# Patient Record
Sex: MALE | Race: WHITE | NOT HISPANIC OR LATINO
[De-identification: names, ages, dates, MRNs, and addresses within clinical notes are randomized per-mention and may not be internally consistent; named-entity substitution may affect disease eponyms.]

---

## 2017-01-10 ENCOUNTER — APPOINTMENT (OUTPATIENT)
Dept: INFUSION THERAPY | Facility: CLINIC | Age: 62
End: 2017-01-10

## 2017-01-10 ENCOUNTER — APPOINTMENT (OUTPATIENT)
Dept: HEMATOLOGY ONCOLOGY | Facility: CLINIC | Age: 62
End: 2017-01-10

## 2017-01-20 ENCOUNTER — APPOINTMENT (OUTPATIENT)
Dept: HEMATOLOGY ONCOLOGY | Facility: CLINIC | Age: 62
End: 2017-01-20

## 2017-01-20 VITALS
SYSTOLIC BLOOD PRESSURE: 130 MMHG | HEIGHT: 68 IN | DIASTOLIC BLOOD PRESSURE: 85 MMHG | TEMPERATURE: 98.6 F | RESPIRATION RATE: 12 BRPM | WEIGHT: 125 LBS | BODY MASS INDEX: 18.94 KG/M2 | HEART RATE: 76 BPM

## 2017-01-20 LAB
BASOPHILS # BLD: 0.01 TH/MM3
BASOPHILS NFR BLD: 0.5 %
EOSINOPHIL # BLD: 0.02 TH/MM3
EOSINOPHIL NFR BLD: 0.9 %
ERYTHROCYTE [DISTWIDTH] IN BLOOD BY AUTOMATED COUNT: 17 %
GRANULOCYTES # BLD: 1.5 TH/MM3
GRANULOCYTES NFR BLD: 68.1 %
HCT VFR BLD AUTO: 31.2 %
HGB BLD-MCNC: 11.3 G/DL
IMM GRANULOCYTES # BLD: 0.01 TH/MM3
IMM GRANULOCYTES NFR BLD: 0.5 %
LYMPHOCYTES # BLD: 0.24 TH/MM3
LYMPHOCYTES NFR BLD: 10.9 %
MCH RBC QN AUTO: 38.3 PG
MCHC RBC AUTO-ENTMCNC: 36.2 G/DL
MCV RBC AUTO: 105.8 FL
MONOCYTES # BLD: 0.42 TH/MM3
MONOCYTES NFR BLD: 19.1 %
PLATELET # BLD: 97 TH/MM3
PMV BLD AUTO: 9.1 FL
RBC # BLD AUTO: 2.95 MIL/MM3
WBC # BLD: 2.2 TH/MM3

## 2017-01-23 ENCOUNTER — APPOINTMENT (OUTPATIENT)
Dept: INFUSION THERAPY | Facility: CLINIC | Age: 62
End: 2017-01-23

## 2017-01-23 LAB
ALBUMIN SERPL-MCNC: 2.9 G/DL
ALBUMIN/GLOB SERPL: 1.53
ALP SERPL-CCNC: 110 IU/L
ALT SERPL-CCNC: 23 IU/L
ANION GAP SERPL CALC-SCNC: 5 MEQ/L
AST SERPL-CCNC: 28 IU/L
BASOPHILS # BLD: 0.06 TH/MM3
BASOPHILS NFR BLD: 2.6 %
BILIRUB SERPL-MCNC: 0.6 MG/DL
BUN SERPL-MCNC: 16 MG/DL
BUN/CREAT SERPL: 19.3 %
CALCIUM SERPL-MCNC: 9 MG/DL
CHLORIDE SERPL-SCNC: 106 MEQ/L
CO2 SERPL-SCNC: 22 MEQ/L
CREAT SERPL-MCNC: 0.83 MG/DL
EOSINOPHIL # BLD: 0.06 TH/MM3
EOSINOPHIL NFR BLD: 2.6 %
ERYTHROCYTE [DISTWIDTH] IN BLOOD BY AUTOMATED COUNT: 16.5 %
GFR SERPL CREATININE-BSD FRML MDRD: 94
GLUCOSE SERPL-MCNC: 86 MG/DL
GRANULOCYTES # BLD: 1.33 TH/MM3
GRANULOCYTES NFR BLD: 57.6 %
HCT VFR BLD AUTO: 35.2 %
HGB BLD-MCNC: 12.1 G/DL
IMM GRANULOCYTES # BLD: 0.05 TH/MM3
IMM GRANULOCYTES NFR BLD: 2.2 %
LYMPHOCYTES # BLD: 0.28 TH/MM3
LYMPHOCYTES NFR BLD: 12.1 %
MCH RBC QN AUTO: 38.2 PG
MCHC RBC AUTO-ENTMCNC: 34.4 G/DL
MCV RBC AUTO: 111 FL
MONOCYTES # BLD: 0.53 TH/MM3
MONOCYTES NFR BLD: 22.9 %
PLATELET # BLD: 91 TH/MM3
PMV BLD AUTO: 9.9 FL
POTASSIUM SERPL-SCNC: 4.2 MMOL/L
PROT SERPL-MCNC: 4.8 G/DL
RBC # BLD AUTO: 3.17 MIL/MM3
SODIUM SERPL-SCNC: 133 MEQ/L
WBC # BLD: 2.31 TH/MM3

## 2017-01-30 ENCOUNTER — APPOINTMENT (OUTPATIENT)
Dept: INFUSION THERAPY | Facility: CLINIC | Age: 62
End: 2017-01-30

## 2017-01-30 ENCOUNTER — RESULT REVIEW (OUTPATIENT)
Age: 62
End: 2017-01-30

## 2017-01-31 ENCOUNTER — RESULT REVIEW (OUTPATIENT)
Age: 62
End: 2017-01-31

## 2017-02-01 LAB
BASOPHILS # BLD: 0.03 TH/MM3
BASOPHILS NFR BLD: 1.5 %
EOSINOPHIL # BLD: 0.1 TH/MM3
EOSINOPHIL NFR BLD: 5.1 %
ERYTHROCYTE [DISTWIDTH] IN BLOOD BY AUTOMATED COUNT: 15.9 %
FOLATE SERPL-MCNC: 8.7 NG/ML
GRANULOCYTES # BLD: 1.25 TH/MM3
GRANULOCYTES NFR BLD: 63.8 %
HCT VFR BLD AUTO: 34.2 %
HGB BLD-MCNC: 11.9 G/DL
IMM GRANULOCYTES # BLD: 0.02 TH/MM3
IMM GRANULOCYTES NFR BLD: 1 %
LYMPHOCYTES # BLD: 0.25 TH/MM3
LYMPHOCYTES NFR BLD: 12.8 %
MCH RBC QN AUTO: 38.4 PG
MCHC RBC AUTO-ENTMCNC: 34.8 G/DL
MCV RBC AUTO: 110.3 FL
MONOCYTES # BLD: 0.31 TH/MM3
MONOCYTES NFR BLD: 15.8 %
PLATELET # BLD: 155 TH/MM3
PMV BLD AUTO: 9.5 FL
RBC # BLD AUTO: 3.1 MIL/MM3
T4 FREE SERPL-MCNC: 0.8 NG/DL
TSH SERPL DL<=0.005 MIU/L-ACNC: 3.13 UIU/ML
VIT B12 SERPL-MCNC: 456 PG/ML
WBC # BLD: 1.96 TH/MM3

## 2017-02-03 LAB — METHYLMALONATE SERPL-SCNC: 175 NMOL/L

## 2017-02-13 ENCOUNTER — RESULT REVIEW (OUTPATIENT)
Age: 62
End: 2017-02-13

## 2017-02-13 ENCOUNTER — APPOINTMENT (OUTPATIENT)
Dept: HEMATOLOGY ONCOLOGY | Facility: CLINIC | Age: 62
End: 2017-02-13

## 2017-02-13 LAB
BASOPHILS # BLD: 0.03 TH/MM3
BASOPHILS NFR BLD: 1 %
EOSINOPHIL # BLD: 0.23 TH/MM3
EOSINOPHIL NFR BLD: 7.4 %
ERYTHROCYTE [DISTWIDTH] IN BLOOD BY AUTOMATED COUNT: 13.5 %
GRANULOCYTES # BLD: 1.85 TH/MM3
GRANULOCYTES NFR BLD: 59.2 %
HCT VFR BLD AUTO: 34 %
HGB BLD-MCNC: 11.6 G/DL
IMM GRANULOCYTES # BLD: 0.06 TH/MM3
IMM GRANULOCYTES NFR BLD: 1.9 %
LYMPHOCYTES # BLD: 0.47 TH/MM3
LYMPHOCYTES NFR BLD: 15.1 %
MCH RBC QN AUTO: 37.5 PG
MCHC RBC AUTO-ENTMCNC: 34.1 G/DL
MCV RBC AUTO: 110 FL
MONOCYTES # BLD: 0.48 TH/MM3
MONOCYTES NFR BLD: 15.4 %
PLATELET # BLD: 109 TH/MM3
PMV BLD AUTO: 9.7 FL
RBC # BLD AUTO: 3.09 MIL/MM3
WBC # BLD: 3.12 TH/MM3

## 2017-02-15 ENCOUNTER — RESULT REVIEW (OUTPATIENT)
Age: 62
End: 2017-02-15

## 2017-02-27 LAB — SEND OUT TEST (NORTH): NORMAL

## 2017-02-28 ENCOUNTER — APPOINTMENT (OUTPATIENT)
Dept: HEMATOLOGY ONCOLOGY | Facility: CLINIC | Age: 62
End: 2017-02-28

## 2017-02-28 VITALS — RESPIRATION RATE: 12 BRPM | HEART RATE: 82 BPM | DIASTOLIC BLOOD PRESSURE: 75 MMHG | SYSTOLIC BLOOD PRESSURE: 135 MMHG

## 2017-02-28 DIAGNOSIS — Z92.3 PERSONAL HISTORY OF IRRADIATION: ICD-10-CM

## 2017-02-28 DIAGNOSIS — Z92.21 PERSONAL HISTORY OF IRRADIATION: ICD-10-CM

## 2017-02-28 LAB — Lab: NORMAL

## 2017-03-03 LAB
ALBUMIN SERPL-MCNC: 3.5 G/DL
ALBUMIN/GLOB SERPL: 1.35
ALP SERPL-CCNC: 102 IU/L
ALT SERPL-CCNC: 10 IU/L
ANION GAP SERPL CALC-SCNC: 9 MEQ/L
AST SERPL-CCNC: 18 IU/L
BASOPHILS # BLD: 0.02 TH/MM3
BASOPHILS NFR BLD: 0.4 %
BILIRUB SERPL-MCNC: 0.6 MG/DL
BUN SERPL-MCNC: 11 MG/DL
BUN/CREAT SERPL: 15.1 %
CALCIUM SERPL-MCNC: 8.9 MG/DL
CHLORIDE SERPL-SCNC: 110 MEQ/L
CO2 SERPL-SCNC: 22 MEQ/L
CREAT SERPL-MCNC: 0.73 MG/DL
EOSINOPHIL # BLD: 0.2 TH/MM3
EOSINOPHIL NFR BLD: 4.1 %
ERYTHROCYTE [DISTWIDTH] IN BLOOD BY AUTOMATED COUNT: 12.5 %
GFR SERPL CREATININE-BSD FRML MDRD: 109
GLUCOSE SERPL-MCNC: 83 MG/DL
GRANULOCYTES # BLD: 3.61 TH/MM3
GRANULOCYTES NFR BLD: 73.7 %
HCT VFR BLD AUTO: 36.5 %
HGB BLD-MCNC: 12.2 G/DL
IMM GRANULOCYTES # BLD: 0.04 TH/MM3
IMM GRANULOCYTES NFR BLD: 0.8 %
LYMPHOCYTES # BLD: 0.49 TH/MM3
LYMPHOCYTES NFR BLD: 10 %
MCH RBC QN AUTO: 36.1 PG
MCHC RBC AUTO-ENTMCNC: 33.4 G/DL
MCV RBC AUTO: 108 FL
MONOCYTES # BLD: 0.54 TH/MM3
MONOCYTES NFR BLD: 11 %
PLATELET # BLD: 188 TH/MM3
PMV BLD AUTO: 9.4 FL
POTASSIUM SERPL-SCNC: 3.7 MMOL/L
PROT SERPL-MCNC: 6.1 G/DL
RBC # BLD AUTO: 3.38 MIL/MM3
SODIUM SERPL-SCNC: 141 MEQ/L
WBC # BLD: 4.9 TH/MM3

## 2017-03-28 ENCOUNTER — APPOINTMENT (OUTPATIENT)
Dept: HEMATOLOGY ONCOLOGY | Facility: CLINIC | Age: 62
End: 2017-03-28

## 2017-03-28 VITALS
SYSTOLIC BLOOD PRESSURE: 143 MMHG | RESPIRATION RATE: 12 BRPM | HEIGHT: 68 IN | HEART RATE: 70 BPM | DIASTOLIC BLOOD PRESSURE: 75 MMHG | TEMPERATURE: 97.3 F

## 2017-03-28 LAB
BASOPHILS # BLD: 0.05 TH/MM3
BASOPHILS NFR BLD: 1.4 %
EOSINOPHIL # BLD: 0.13 TH/MM3
EOSINOPHIL NFR BLD: 3.6 %
ERYTHROCYTE [DISTWIDTH] IN BLOOD BY AUTOMATED COUNT: 11.7 %
GRANULOCYTES # BLD: 2.28 TH/MM3
GRANULOCYTES NFR BLD: 63.7 %
HCT VFR BLD AUTO: 42.8 %
HGB BLD-MCNC: 15 G/DL
IMM GRANULOCYTES # BLD: 0.06 TH/MM3
IMM GRANULOCYTES NFR BLD: 1.7 %
LYMPHOCYTES # BLD: 0.59 TH/MM3
LYMPHOCYTES NFR BLD: 16.5 %
MCH RBC QN AUTO: 36.3 PG
MCHC RBC AUTO-ENTMCNC: 35 G/DL
MCV RBC AUTO: 103.6 FL
MONOCYTES # BLD: 0.47 TH/MM3
MONOCYTES NFR BLD: 13.1 %
PLATELET # BLD: 156 TH/MM3
PMV BLD AUTO: 9.5 FL
RBC # BLD AUTO: 4.13 MIL/MM3
WBC # BLD: 3.58 TH/MM3

## 2017-05-01 ENCOUNTER — APPOINTMENT (OUTPATIENT)
Dept: HEMATOLOGY ONCOLOGY | Facility: CLINIC | Age: 62
End: 2017-05-01

## 2017-05-02 LAB
BASOPHILS # BLD: 0.02 TH/MM3
BASOPHILS NFR BLD: 0.5 %
EOSINOPHIL # BLD: 0.09 TH/MM3
EOSINOPHIL NFR BLD: 2.3 %
ERYTHROCYTE [DISTWIDTH] IN BLOOD BY AUTOMATED COUNT: 11.8 %
GRANULOCYTES # BLD: 2.85 TH/MM3
GRANULOCYTES NFR BLD: 72.7 %
HCT VFR BLD AUTO: 48.1 %
HGB BLD-MCNC: 16.8 G/DL
IMM GRANULOCYTES # BLD: 0.02 TH/MM3
IMM GRANULOCYTES NFR BLD: 0.5 %
LYMPHOCYTES # BLD: 0.51 TH/MM3
LYMPHOCYTES NFR BLD: 13 %
MCH RBC QN AUTO: 35.3 PG
MCHC RBC AUTO-ENTMCNC: 34.9 G/DL
MCV RBC AUTO: 101.1 FL
MONOCYTES # BLD: 0.43 TH/MM3
MONOCYTES NFR BLD: 11 %
PLATELET # BLD: 151 TH/MM3
PMV BLD AUTO: 9.5 FL
RBC # BLD AUTO: 4.76 MIL/MM3
WBC # BLD: 3.92 TH/MM3

## 2017-05-09 ENCOUNTER — APPOINTMENT (OUTPATIENT)
Dept: HEMATOLOGY ONCOLOGY | Facility: CLINIC | Age: 62
End: 2017-05-09

## 2017-05-09 VITALS
BODY MASS INDEX: 22.73 KG/M2 | HEIGHT: 68 IN | HEART RATE: 87 BPM | SYSTOLIC BLOOD PRESSURE: 177 MMHG | WEIGHT: 150 LBS | DIASTOLIC BLOOD PRESSURE: 87 MMHG | RESPIRATION RATE: 12 BRPM | TEMPERATURE: 97.2 F

## 2017-06-27 ENCOUNTER — APPOINTMENT (OUTPATIENT)
Dept: HEMATOLOGY ONCOLOGY | Facility: CLINIC | Age: 62
End: 2017-06-27

## 2017-07-17 ENCOUNTER — APPOINTMENT (OUTPATIENT)
Dept: HEMATOLOGY ONCOLOGY | Facility: CLINIC | Age: 62
End: 2017-07-17

## 2017-07-17 VITALS
BODY MASS INDEX: 22.88 KG/M2 | TEMPERATURE: 97 F | HEIGHT: 68 IN | HEART RATE: 53 BPM | WEIGHT: 151 LBS | SYSTOLIC BLOOD PRESSURE: 139 MMHG | RESPIRATION RATE: 12 BRPM | DIASTOLIC BLOOD PRESSURE: 92 MMHG

## 2017-07-18 LAB
ALBUMIN SERPL-MCNC: 4.5 G/DL
ALBUMIN/GLOB SERPL: 2.05
ALP SERPL-CCNC: 70 IU/L
ALT SERPL-CCNC: 14 IU/L
ANION GAP SERPL CALC-SCNC: 9 MEQ/L
AST SERPL-CCNC: 19 IU/L
BASOPHILS # BLD: 0.02 TH/MM3
BASOPHILS NFR BLD: 0.5 %
BILIRUB SERPL-MCNC: 1.6 MG/DL
BUN SERPL-MCNC: 14 MG/DL
BUN/CREAT SERPL: 17.1 %
CALCIUM SERPL-MCNC: 9.7 MG/DL
CEA SERPL-MCNC: 1.6 NG/ML
CHLORIDE SERPL-SCNC: 103 MEQ/L
CO2 SERPL-SCNC: 26 MEQ/L
CREAT SERPL-MCNC: 0.82 MG/DL
EOSINOPHIL # BLD: 0.04 TH/MM3
EOSINOPHIL NFR BLD: 1 %
ERYTHROCYTE [DISTWIDTH] IN BLOOD BY AUTOMATED COUNT: 12.7 %
GFR SERPL CREATININE-BSD FRML MDRD: 95
GLUCOSE SERPL-MCNC: 87 MG/DL
GRANULOCYTES # BLD: 2.59 TH/MM3
GRANULOCYTES NFR BLD: 66.4 %
HCT VFR BLD AUTO: 34.2 %
HGB BLD-MCNC: 11.7 G/DL
IMM GRANULOCYTES # BLD: 0.01 TH/MM3
IMM GRANULOCYTES NFR BLD: 0.3 %
LYMPHOCYTES # BLD: 0.64 TH/MM3
LYMPHOCYTES NFR BLD: 16.4 %
MCH RBC QN AUTO: 36 PG
MCHC RBC AUTO-ENTMCNC: 34.2 G/DL
MCV RBC AUTO: 105.2 FL
MONOCYTES # BLD: 0.6 TH/MM3
MONOCYTES NFR BLD: 15.4 %
PLATELET # BLD: 204 TH/MM3
PMV BLD AUTO: 8.8 FL
POTASSIUM SERPL-SCNC: 4.4 MMOL/L
PROT SERPL-MCNC: 6.7 G/DL
RBC # BLD AUTO: 3.25 MIL/MM3
SODIUM SERPL-SCNC: 138 MEQ/L
WBC # BLD: 3.9 TH/MM3

## 2017-08-02 ENCOUNTER — RESULT REVIEW (OUTPATIENT)
Age: 62
End: 2017-08-02

## 2017-08-02 ENCOUNTER — APPOINTMENT (OUTPATIENT)
Dept: HEMATOLOGY ONCOLOGY | Facility: CLINIC | Age: 62
End: 2017-08-02

## 2017-08-02 ENCOUNTER — OUTPATIENT (OUTPATIENT)
Dept: OUTPATIENT SERVICES | Facility: HOSPITAL | Age: 62
LOS: 1 days | Discharge: HOME | End: 2017-08-02

## 2017-08-02 VITALS
SYSTOLIC BLOOD PRESSURE: 130 MMHG | DIASTOLIC BLOOD PRESSURE: 80 MMHG | BODY MASS INDEX: 23.95 KG/M2 | HEIGHT: 68 IN | HEART RATE: 50 BPM | RESPIRATION RATE: 12 BRPM | WEIGHT: 158 LBS | TEMPERATURE: 96.7 F

## 2017-08-02 DIAGNOSIS — K52.9 NONINFECTIVE GASTROENTERITIS AND COLITIS, UNSPECIFIED: ICD-10-CM

## 2017-08-02 DIAGNOSIS — D72.819 DECREASED WHITE BLOOD CELL COUNT, UNSPECIFIED: ICD-10-CM

## 2017-08-02 DIAGNOSIS — K55.039 ACUTE (REVERSIBLE) ISCHEMIA OF LARGE INTESTINE, EXTENT UNSPECIFIED: ICD-10-CM

## 2017-08-02 DIAGNOSIS — R19.7 DIARRHEA, UNSPECIFIED: ICD-10-CM

## 2017-08-02 DIAGNOSIS — C20 MALIGNANT NEOPLASM OF RECTUM: ICD-10-CM

## 2017-08-02 LAB
BASOPHILS # BLD: 0.02 TH/MM3
BASOPHILS NFR BLD: 0.6 %
EOSINOPHIL # BLD: 0.09 TH/MM3
EOSINOPHIL NFR BLD: 2.6 %
ERYTHROCYTE [DISTWIDTH] IN BLOOD BY AUTOMATED COUNT: 12.2 %
GRANULOCYTES # BLD: 2.1 TH/MM3
GRANULOCYTES NFR BLD: 60.7 %
HCT VFR BLD AUTO: 37.9 %
HGB BLD-MCNC: 13 G/DL
IMM GRANULOCYTES # BLD: 0.01 TH/MM3
IMM GRANULOCYTES NFR BLD: 0.3 %
LYMPHOCYTES # BLD: 0.64 TH/MM3
LYMPHOCYTES NFR BLD: 18.5 %
MCH RBC QN AUTO: 36.1 PG
MCHC RBC AUTO-ENTMCNC: 34.3 G/DL
MCV RBC AUTO: 105.3 FL
MONOCYTES # BLD: 0.6 TH/MM3
MONOCYTES NFR BLD: 17.3 %
PLATELET # BLD: 175 TH/MM3
PMV BLD AUTO: 9 FL
RBC # BLD AUTO: 3.6 MIL/MM3
RETICS/RBC NFR: 1.29 %
WBC # BLD: 3.46 TH/MM3

## 2017-08-03 DIAGNOSIS — D64.9 ANEMIA, UNSPECIFIED: ICD-10-CM

## 2017-08-03 LAB
FERRITIN SERPL-MCNC: 283 NG/ML
FOLATE SERPL-MCNC: 12.1 NG/ML
PERCENT SATURATION (NORTH): 32.1 %
TIBC SERPL-MCNC: 333 UG/DL
TSH SERPL DL<=0.005 MIU/L-ACNC: 2.66 UIU/ML
VIT B12 SERPL-MCNC: 226 PG/ML

## 2017-10-11 ENCOUNTER — APPOINTMENT (OUTPATIENT)
Dept: HEMATOLOGY ONCOLOGY | Facility: CLINIC | Age: 62
End: 2017-10-11

## 2017-10-11 VITALS
RESPIRATION RATE: 14 BRPM | TEMPERATURE: 96.8 F | BODY MASS INDEX: 23.79 KG/M2 | HEIGHT: 68 IN | WEIGHT: 157 LBS | SYSTOLIC BLOOD PRESSURE: 142 MMHG | HEART RATE: 65 BPM | DIASTOLIC BLOOD PRESSURE: 82 MMHG

## 2017-10-12 LAB
ALBUMIN SERPL-MCNC: 4.6 G/DL
ALBUMIN/GLOB SERPL: 2.19
ALP SERPL-CCNC: 78 IU/L
ALT SERPL-CCNC: 47 IU/L
ANION GAP SERPL CALC-SCNC: 12 MEQ/L
AST SERPL-CCNC: 41 IU/L
BASOPHILS # BLD: 0.01 TH/MM3
BASOPHILS NFR BLD: 0.3 %
BILIRUB SERPL-MCNC: 0.9 MG/DL
BUN SERPL-MCNC: 6 MG/DL
BUN/CREAT SERPL: 8.3 %
CALCIUM SERPL-MCNC: 9.7 MG/DL
CEA SERPL-MCNC: 2 NG/ML
CHLORIDE SERPL-SCNC: 99 MEQ/L
CO2 SERPL-SCNC: 28 MEQ/L
CREAT SERPL-MCNC: 0.72 MG/DL
EOSINOPHIL # BLD: 0.12 TH/MM3
EOSINOPHIL NFR BLD: 4 %
ERYTHROCYTE [DISTWIDTH] IN BLOOD BY AUTOMATED COUNT: 11.1 %
GFR SERPL CREATININE-BSD FRML MDRD: 110
GLUCOSE SERPL-MCNC: 77 MG/DL
GRANULOCYTES # BLD: 1.93 TH/MM3
GRANULOCYTES NFR BLD: 65.1 %
HCT VFR BLD AUTO: 41.6 %
HGB BLD-MCNC: 14.5 G/DL
IMM GRANULOCYTES # BLD: 0 TH/MM3
IMM GRANULOCYTES NFR BLD: 0 %
LYMPHOCYTES # BLD: 0.52 TH/MM3
LYMPHOCYTES NFR BLD: 17.5 %
MCH RBC QN AUTO: 36.1 PG
MCHC RBC AUTO-ENTMCNC: 34.9 G/DL
MCV RBC AUTO: 103.5 FL
MONOCYTES # BLD: 0.39 TH/MM3
MONOCYTES NFR BLD: 13.1 %
PLATELET # BLD: 153 TH/MM3
PMV BLD AUTO: 9.3 FL
POTASSIUM SERPL-SCNC: 3.7 MMOL/L
PROT SERPL-MCNC: 6.7 G/DL
RBC # BLD AUTO: 4.02 MIL/MM3
SODIUM SERPL-SCNC: 139 MEQ/L
WBC # BLD: 2.97 TH/MM3

## 2017-10-15 LAB — METHYLMALONATE SERPL-SCNC: 119 NMOL/L

## 2017-10-18 ENCOUNTER — APPOINTMENT (OUTPATIENT)
Dept: HEMATOLOGY ONCOLOGY | Facility: CLINIC | Age: 62
End: 2017-10-18

## 2017-10-20 ENCOUNTER — OUTPATIENT (OUTPATIENT)
Dept: OUTPATIENT SERVICES | Facility: HOSPITAL | Age: 62
LOS: 1 days | Discharge: HOME | End: 2017-10-20

## 2017-10-20 DIAGNOSIS — R19.7 DIARRHEA, UNSPECIFIED: ICD-10-CM

## 2017-10-20 DIAGNOSIS — K55.039 ACUTE (REVERSIBLE) ISCHEMIA OF LARGE INTESTINE, EXTENT UNSPECIFIED: ICD-10-CM

## 2017-10-20 DIAGNOSIS — E53.8 DEFICIENCY OF OTHER SPECIFIED B GROUP VITAMINS: ICD-10-CM

## 2017-10-20 DIAGNOSIS — K52.9 NONINFECTIVE GASTROENTERITIS AND COLITIS, UNSPECIFIED: ICD-10-CM

## 2018-01-10 ENCOUNTER — OUTPATIENT (OUTPATIENT)
Dept: OUTPATIENT SERVICES | Facility: HOSPITAL | Age: 63
LOS: 1 days | Discharge: HOME | End: 2018-01-10

## 2018-01-10 ENCOUNTER — APPOINTMENT (OUTPATIENT)
Dept: HEMATOLOGY ONCOLOGY | Facility: CLINIC | Age: 63
End: 2018-01-10

## 2018-01-10 VITALS
HEIGHT: 68 IN | DIASTOLIC BLOOD PRESSURE: 82 MMHG | WEIGHT: 166 LBS | HEART RATE: 60 BPM | RESPIRATION RATE: 14 BRPM | SYSTOLIC BLOOD PRESSURE: 153 MMHG | BODY MASS INDEX: 25.16 KG/M2 | TEMPERATURE: 97.6 F

## 2018-01-10 DIAGNOSIS — C20 MALIGNANT NEOPLASM OF RECTUM: ICD-10-CM

## 2018-01-10 LAB
BASOPHILS # BLD: 0.02 TH/MM3
BASOPHILS NFR BLD: 0.7 %
EOSINOPHIL # BLD: 0.21 TH/MM3
EOSINOPHIL NFR BLD: 6.9 %
ERYTHROCYTE [DISTWIDTH] IN BLOOD BY AUTOMATED COUNT: 11.6 %
GRANULOCYTES # BLD: 1.79 TH/MM3
GRANULOCYTES NFR BLD: 58.4 %
HCT VFR BLD AUTO: 37.8 %
HGB BLD-MCNC: 12.9 G/DL
IMM GRANULOCYTES # BLD: 0 TH/MM3
IMM GRANULOCYTES NFR BLD: 0 %
LYMPHOCYTES # BLD: 0.58 TH/MM3
LYMPHOCYTES NFR BLD: 19 %
MCH RBC QN AUTO: 36 PG
MCHC RBC AUTO-ENTMCNC: 34.1 G/DL
MCV RBC AUTO: 105.6 FL
MONOCYTES # BLD: 0.46 TH/MM3
MONOCYTES NFR BLD: 15 %
PLATELET # BLD: 166 TH/MM3
PMV BLD AUTO: 9.1 FL
RBC # BLD AUTO: 3.58 MIL/MM3
WBC # BLD: 3.06 TH/MM3

## 2018-01-11 LAB
ALBUMIN SERPL-MCNC: 4.3 G/DL
ALBUMIN/GLOB SERPL: 1.79
ALP SERPL-CCNC: 75 IU/L
ALT SERPL-CCNC: 70 IU/L
ANION GAP SERPL CALC-SCNC: 7 MEQ/L
AST SERPL-CCNC: 64 IU/L
BILIRUB SERPL-MCNC: 0.8 MG/DL
BUN SERPL-MCNC: 10 MG/DL
BUN/CREAT SERPL: 11 %
CALCIUM SERPL-MCNC: 9.7 MG/DL
CEA SERPL-MCNC: 2 NG/ML
CHLORIDE SERPL-SCNC: 105 MEQ/L
CO2 SERPL-SCNC: 26 MEQ/L
CREAT SERPL-MCNC: 0.91 MG/DL
GFR SERPL CREATININE-BSD FRML MDRD: 84
GLUCOSE SERPL-MCNC: 97 MG/DL
POTASSIUM SERPL-SCNC: 3.8 MMOL/L
PROT SERPL-MCNC: 6.7 G/DL
SODIUM SERPL-SCNC: 138 MEQ/L

## 2018-02-14 ENCOUNTER — APPOINTMENT (OUTPATIENT)
Dept: HEMATOLOGY ONCOLOGY | Facility: CLINIC | Age: 63
End: 2018-02-14

## 2018-05-25 ENCOUNTER — OUTPATIENT (OUTPATIENT)
Dept: OUTPATIENT SERVICES | Facility: HOSPITAL | Age: 63
LOS: 1 days | Discharge: HOME | End: 2018-05-25

## 2018-05-25 DIAGNOSIS — C21.8 MALIGNANT NEOPLASM OF OVERLAPPING SITES OF RECTUM, ANUS AND ANAL CANAL: ICD-10-CM

## 2018-07-25 ENCOUNTER — OUTPATIENT (OUTPATIENT)
Dept: OUTPATIENT SERVICES | Facility: HOSPITAL | Age: 63
LOS: 1 days | Discharge: HOME | End: 2018-07-25

## 2018-07-25 ENCOUNTER — APPOINTMENT (OUTPATIENT)
Dept: HEMATOLOGY ONCOLOGY | Facility: CLINIC | Age: 63
End: 2018-07-25

## 2018-07-25 ENCOUNTER — LABORATORY RESULT (OUTPATIENT)
Age: 63
End: 2018-07-25

## 2018-07-25 VITALS
TEMPERATURE: 97.8 F | HEART RATE: 67 BPM | SYSTOLIC BLOOD PRESSURE: 150 MMHG | WEIGHT: 158 LBS | BODY MASS INDEX: 23.95 KG/M2 | RESPIRATION RATE: 14 BRPM | HEIGHT: 68 IN | DIASTOLIC BLOOD PRESSURE: 84 MMHG

## 2018-07-25 DIAGNOSIS — Z87.19 PERSONAL HISTORY OF OTHER DISEASES OF THE DIGESTIVE SYSTEM: ICD-10-CM

## 2018-07-25 LAB
ALBUMIN SERPL ELPH-MCNC: 4.9 G/DL
ALP BLD-CCNC: 65 U/L
ALT SERPL-CCNC: 19 U/L
ANION GAP SERPL CALC-SCNC: 14 MMOL/L
AST SERPL-CCNC: 22 U/L
BILIRUB SERPL-MCNC: 0.6 MG/DL
BUN SERPL-MCNC: 14 MG/DL
CALCIUM SERPL-MCNC: 9.8 MG/DL
CHLORIDE SERPL-SCNC: 103 MMOL/L
CO2 SERPL-SCNC: 27 MMOL/L
CREAT SERPL-MCNC: 1.1 MG/DL
GLUCOSE SERPL-MCNC: 90 MG/DL
HCT VFR BLD CALC: 40.2 %
HGB BLD-MCNC: 14 G/DL
MCHC RBC-ENTMCNC: 34.8 G/DL
MCHC RBC-ENTMCNC: 36.2 PG
MCV RBC AUTO: 103.9 FL
PLATELET # BLD AUTO: 220 K/UL
PMV BLD: 9.5 FL
POTASSIUM SERPL-SCNC: 4.2 MMOL/L
PROT SERPL-MCNC: 7.2 G/DL
RBC # BLD: 3.87 M/UL
RBC # FLD: 11 %
SODIUM SERPL-SCNC: 144 MMOL/L
WBC # FLD AUTO: 3.26 K/UL

## 2018-07-25 NOTE — RESULTS/DATA
[FreeTextEntry1] : [facility],   \par [address]  [place] [address]        [phone] \par \par Patient  Name:  ASIM STREET  :  1955 \par Patient  ID:  [id number] \par Account:  [id number] \par Patient  Location:  [facility] \par \par Accession:  [id number] \par Procedure:  CT  ABDOMEN  PELVIS  W  IV  AND  PO  CONTRAST  [id number] \par Date  of  Exam:  2017  01:52  PM \par Attending  Physician:  ALEX CONSTANTINO \par Requesting  Physician:  ALEX CONSTANTINO MD \par \par \par CLINICAL  STATEMENT:  Rectal  cancer  post  chemotherapy,  radiation  therapy  and  surgery.    \par   \par TECHNIQUE:  Contiguous  axial  CT  images  were  obtained  from  the  lung  bases  to  the  pubic  symphysis  following  administration  of  100cc  Optiray  320  intravenous  contrast.    Oral  contrast  was  administered.    Reformatted  images  in  the  coronal  and  sagittal  planes  were  acquired. \par   \par COMPARISON:    CT  abdomen  pelvis  dated  2016  and  MRI  of  pelvis  dated  2016 \par   \par FINDINGS: \par   \par LUNG  BASES:  There  is  an  unchanged  6  mm  groundglass  right  lower  lobe  pulmonary  nodule  (series  4,  image  42). \par LIVER:  Hepatic  steatosis. \par SPLEEN:  Unremarkable. \par PANCREAS:  Unremarkable. \par GALLBLADDER  AND  BILIARY  TREE:  There  is  cholelithiasis.  No  biliary  ductal  dilatation. \par ADRENALS:  Unremarkable. \par KIDNEYS:  Symmetric  enhancement  without  hydronephrosis. \par LYMPH  NODES:  There  are  no  enlarged  abdominal  or  pelvic  lymph  nodes. \par VASCULATURE:  The  abdominal  aorta  is  normal  in  caliber  and  demonstrates  atherosclerotic  changes. \par BOWEL:  Patient  is  post  rectal  anastomosis.  There  is  a  right  lower  quadrant  loop  ileostomy.  No  bowel  obstruction. \par PERITONEUM/RETROPERITONEUM/MESENTERY:  Presacral  edema.  No  ascites  or  pneumoperitoneum. \par PELVIC  VISCERA:  Unremarkable. \par BONES  AND  SOFT  TISSUES:  No  acute  osseous  abnormality  is  noted.  L4-L5  spondylolysis.  Grade  1  anterolisthesis  of  L5  on  S1.  \par   \par IMPRESSION: \par   \par Post  rectal  anastomosis  and  right  lower  quadrant  ileostomy  without  CT  evidence  of  abdominopelvic  residual  disease. \par   \par Unchanged  6  mm  groundglass  right  lower  lobe  pulmonary  nodule. \par   \par \par \par \par Original  final  report  dictated  and  signed  by  Dr. Eric Cristobal  on  2017  03:50  PM

## 2018-07-25 NOTE — HISTORY OF PRESENT ILLNESS
[Disease: _____________________] : Disease: [unfilled] [T: ___] : T[unfilled] [N: ___] : N[unfilled] [M: ___] : M[unfilled] [AJCC Stage: ____] : AJCC Stage: [unfilled] [de-identified] : Patient is a 60 year old male who was diagnosed with rectal carcinoma after symptoms of hematochezia, tenesmus. He then underwent colonoscopy with Dr. Dory Carrillo on 04/22/2016, that showed an obstructing circumferential mass at 10 cm from anal verge consistent with adenocarcinoma. Patient was given chemoradiation with xeloda and he finsihed it then underwent laparoscopic LAR with TME and diverting loop ileostomy on 8/30/2016. Patient recovered from surgery except for hospitalization with dehydration from diarrhea postoperatively that resolved. He was started on adjuvant xelox on 10/4/2016 and completed 4 cycles in December of 2016. [de-identified] : adenocarcinoma [de-identified] :   adenocarcinoma, low grade, well differentiated.  Tumor invades through the muscularis propria into subserosal adipose tissue   \par  [de-identified] : 5/9/17\par He presents to establish care. He is to have his ostomy reversed but was noted to have low WBC on blood work. See bellprincess. Due to his cytopenia  he had a bone marrow biopsy in 2/2017 that showed hypocellular marrrow. Cytogenetics and FISH was negative.\par   He had a CT abd/pelvis on 3/2017 that showed Post rectal anastomosis and right lower quadrant ileostomy without CT evidence of abdominopelvic residual disease.Unchanged 6 mm ground-glass right lower lobe pulmonary nodule. \par \par He had a colonoscopy on April 10th by Dr. Handley and reports it was normal.  He was to have his ostomy reversed by Timmy Arriaga but he was not cleared by his  PMD Dr. Earline Acosta due to low WBC. Repeat CBC showed improvement.\par \par 7/17/17\par He is doing well. He has mild grade 1 neuropathy in feet that he barely feels. His bowel movements are improving.  No weight loss, no bleeding. Ostomy was reversed. .\par \par 8/2/17\par I brought him back for follow up do tof all in his HgB. He states he feels well except for chronic neuropathy. Stools are brown and urine is yellow.\par \par 10/11/17\par He is here for follow. He feels well.  He was started on Oral b12 tabs since his B12 level was low. He has no complaints.\par \par 1/10/18\par He is here for follow up.  He has a CT C/A/P on 10/20/17 that showed stable pulmonary nodules and No evidence of metastatic disease within the abdomen or pelvis.  CEA was 2. He feels well. Was started back on his ACEi for HTN. Normal bowel movements. No bleeding. He stopped his b12 \par \par 7/25/18\par He is here for follow up. He had an US liver on 5/2018 that showed fatty liver. Colonosocpy on 5/2018 by Dr. Handley  shwoed only  mild radiation proctitis next colonosocy in 3 years May 2021. Blood work quest in May 2018  CMP normal, Cholesterol was a bit high. WBC 3.6, HgB 13.9, , Lymphs low 842, Plts 199\par He states he feels fine. HE admits to drinking 3 glasses of wine every night.

## 2018-07-25 NOTE — PHYSICAL EXAM
[Fully active, able to carry on all pre-disease performance without restriction] : Status 0 - Fully active, able to carry on all pre-disease performance without restriction [Normal] : affect appropriate [Ulcers] : no ulcers [Mucositis] : no mucositis [Thrush] : no thrush [Vesicles] : no vesicles [de-identified] : surgical scars, small hernia at incision site

## 2018-07-25 NOTE — CONSULT LETTER
[Dear  ___] : Dear  [unfilled], [Consult Letter:] : I had the pleasure of evaluating your patient, [unfilled]. [Please see my note below.] : Please see my note below. [Sincerely,] : Sincerely, [DrShanell  ___] : Dr. LOVING [DrShanell ___] : Dr. LOVING [FreeTextEntry3] : Shadi Corbett

## 2018-07-25 NOTE — ASSESSMENT
[Curative] : Goals of care discussed with patient: Curative [FreeTextEntry1] : 1 Stage III rectal cancer status post chemoradiation and LAR/ileostomy and 4 cycles of adjuvant chemotherapy with XELOX. His CT is showing no evidence of recurrence  and  colonoscopy in around April of 2017 was negative as well as  Repeat CT scans in October of 2017 and Colonoscopy in  May 2018\par -CMP and  CEA today, he is due for CTs in October of 2018 \par -next colonoscopy may 2021\par \par 2. Decrease white blood cell count as well as macrocytosis his anemia resolved, CBC from May is stable \par -he had a bone marrow that did not demonstrate any pathology \par -I suspect this is due this Alcohol use since he claims 3 glasses of wine daily  as and increase MCV may bee seen with Fatty liver as well which he has\par -I recommended he cuts down to one glass of wine a day\par -will recheck CBC, B12, folate and TSH and MMA\par \par RTC in October

## 2018-07-25 NOTE — REVIEW OF SYSTEMS
[Negative] : Neurological [Fever] : no fever [Fatigue] : no fatigue [Recent Change In Weight] : ~T no recent weight change [Chest Pain] : no chest pain [Shortness Of Breath] : no shortness of breath [Abdominal Pain] : no abdominal pain [Vomiting] : no vomiting [Diarrhea] : no diarrhea [Dysuria] : no dysuria [Joint Pain] : no joint pain [Skin Rash] : no skin rash [FreeTextEntry7] : He has a hernia

## 2018-07-25 NOTE — REASON FOR VISIT
[Follow-Up Visit] : a follow-up [Initial Consultation] : an initial consultation [FreeTextEntry2] : Rectal cancer

## 2018-07-27 LAB
CEA SERPL-MCNC: 1.8 NG/ML
FOLATE SERPL-MCNC: 18.6 NG/ML
TSH SERPL-ACNC: 3.58 UIU/ML
VIT B12 SERPL-MCNC: 361 PG/ML

## 2018-08-08 DIAGNOSIS — C20 MALIGNANT NEOPLASM OF RECTUM: ICD-10-CM

## 2018-08-10 DIAGNOSIS — D64.9 ANEMIA, UNSPECIFIED: ICD-10-CM

## 2018-08-10 DIAGNOSIS — D72.819 DECREASED WHITE BLOOD CELL COUNT, UNSPECIFIED: ICD-10-CM

## 2018-08-16 LAB — METHYLMALONATE SERPL-SCNC: 224 NMOL/L

## 2018-09-13 ENCOUNTER — OUTPATIENT (OUTPATIENT)
Dept: OUTPATIENT SERVICES | Facility: HOSPITAL | Age: 63
LOS: 1 days | Discharge: HOME | End: 2018-09-13

## 2018-09-13 DIAGNOSIS — Z00.00 ENCOUNTER FOR GENERAL ADULT MEDICAL EXAMINATION WITHOUT ABNORMAL FINDINGS: ICD-10-CM

## 2018-10-17 ENCOUNTER — LABORATORY RESULT (OUTPATIENT)
Age: 63
End: 2018-10-17

## 2018-10-17 ENCOUNTER — APPOINTMENT (OUTPATIENT)
Dept: HEMATOLOGY ONCOLOGY | Facility: CLINIC | Age: 63
End: 2018-10-17

## 2018-10-17 VITALS
RESPIRATION RATE: 14 BRPM | TEMPERATURE: 97.4 F | SYSTOLIC BLOOD PRESSURE: 138 MMHG | BODY MASS INDEX: 23.79 KG/M2 | DIASTOLIC BLOOD PRESSURE: 65 MMHG | HEIGHT: 68 IN | HEART RATE: 59 BPM | WEIGHT: 157 LBS

## 2018-10-17 RX ORDER — FENOFIBRATE 145 MG/1
145 TABLET, COATED ORAL
Refills: 0 | Status: ACTIVE | COMMUNITY

## 2018-10-18 LAB
ALBUMIN SERPL ELPH-MCNC: 4.9 G/DL
ALP BLD-CCNC: 52 U/L
ALT SERPL-CCNC: 19 U/L
ANION GAP SERPL CALC-SCNC: 13 MMOL/L
AST SERPL-CCNC: 23 U/L
BILIRUB SERPL-MCNC: 0.4 MG/DL
BUN SERPL-MCNC: 18 MG/DL
CALCIUM SERPL-MCNC: 10.2 MG/DL
CEA SERPL-MCNC: 1.5 NG/ML
CHLORIDE SERPL-SCNC: 100 MMOL/L
CO2 SERPL-SCNC: 27 MMOL/L
CREAT SERPL-MCNC: 1 MG/DL
GLUCOSE SERPL-MCNC: 98 MG/DL
HCT VFR BLD CALC: 39.9 %
HGB BLD-MCNC: 13.4 G/DL
MCHC RBC-ENTMCNC: 33.6 G/DL
MCHC RBC-ENTMCNC: 34.4 PG
MCV RBC AUTO: 102.6 FL
PLATELET # BLD AUTO: 206 K/UL
PMV BLD: 9.5 FL
POTASSIUM SERPL-SCNC: 4.6 MMOL/L
PROT SERPL-MCNC: 7.3 G/DL
RBC # BLD: 3.89 M/UL
RBC # FLD: 11.5 %
SODIUM SERPL-SCNC: 140 MMOL/L
WBC # FLD AUTO: 3.22 K/UL

## 2018-10-19 NOTE — HISTORY OF PRESENT ILLNESS
[Disease: _____________________] : Disease: [unfilled] [T: ___] : T[unfilled] [N: ___] : N[unfilled] [M: ___] : M[unfilled] [AJCC Stage: ____] : AJCC Stage: [unfilled] [de-identified] : Patient is a 60 year old male who was diagnosed with rectal carcinoma after symptoms of hematochezia, tenesmus. He then underwent colonoscopy with Dr. Dory Carrillo on 04/22/2016, that showed an obstructing circumferential mass at 10 cm from anal verge consistent with adenocarcinoma. Patient was given chemoradiation with xeloda and he finsihed it then underwent laparoscopic LAR with TME and diverting loop ileostomy on 8/30/2016. Patient recovered from surgery except for hospitalization with dehydration from diarrhea postoperatively that resolved. He was started on adjuvant xelox on 10/4/2016 and completed 4 cycles in December of 2016. [de-identified] : adenocarcinoma [de-identified] :   adenocarcinoma, low grade, well differentiated.  Tumor invades through the muscularis propria into subserosal adipose tissue   \par  [de-identified] : 5/9/17\par He presents to establish care. He is to have his ostomy reversed but was noted to have low WBC on blood work. See bellprincess. Due to his cytopenia  he had a bone marrow biopsy in 2/2017 that showed hypocellular marrrow. Cytogenetics and FISH was negative.\par   He had a CT abd/pelvis on 3/2017 that showed Post rectal anastomosis and right lower quadrant ileostomy without CT evidence of abdominopelvic residual disease.Unchanged 6 mm ground-glass right lower lobe pulmonary nodule. \par \par He had a colonoscopy on April 10th by Dr. Handley and reports it was normal.  He was to have his ostomy reversed by Timmy Arriaga but he was not cleared by his  PMD Dr. Earline Acosta due to low WBC. Repeat CBC showed improvement.\par \par 7/17/17\par He is doing well. He has mild grade 1 neuropathy in feet that he barely feels. His bowel movements are improving.  No weight loss, no bleeding. Ostomy was reversed. .\par \par 8/2/17\par I brought him back for follow up do tof all in his HgB. He states he feels well except for chronic neuropathy. Stools are brown and urine is yellow.\par \par 10/11/17\par He is here for follow. He feels well.  He was started on Oral b12 tabs since his B12 level was low. He has no complaints.\par \par 1/10/18\par He is here for follow up.  He has a CT C/A/P on 10/20/17 that showed stable pulmonary nodules and No evidence of metastatic disease within the abdomen or pelvis.  CEA was 2. He feels well. Was started back on his ACEi for HTN. Normal bowel movements. No bleeding. He stopped his b12 \par \par 7/25/18\par He is here for follow up. He had an US liver on 5/2018 that showed fatty liver. Colonosocpy on 5/2018 by Dr. Handley  shwoed only  mild radiation proctitis next colonosocy in 3 years May 2021. Blood work quest in May 2018  CMP normal, Cholesterol was a bit high. WBC 3.6, HgB 13.9, , Lymphs low 842, Plts 199\par He states he feels fine. HE admits to drinking 3 glasses of wine every night. \par \par 10/17/18\par He is here for follow up. 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

## 2018-10-19 NOTE — RESULTS/DATA
[FreeTextEntry1] : [facility]  [facility],   \par [address]  [place] [address]        [phone] \par \par Patient  Name:  ASIM STREET  :  1955 \par Patient  ID:  [id number] \par Account:  [id number] \par Patient  Location:  [facility] \par \par Accession:  [id number] \par Procedure:  CT  ABDOMEN  PELVIS  W  IV  AND  PO  CONTRAST  [id number] \par Date  of  Exam:  2017  01:52  PM \par Attending  Physician:  ALEX CONSTANTINO \par Requesting  Physician:  ALEX CONSTANTINO MD \par \par \par CLINICAL  STATEMENT:  Rectal  cancer  post  chemotherapy,  radiation  therapy  and  surgery.    \par   \par TECHNIQUE:  Contiguous  axial  CT  images  were  obtained  from  the  lung  bases  to  the  pubic  symphysis  following  administration  of  100cc  Optiray  320  intravenous  contrast.    Oral  contrast  was  administered.    Reformatted  images  in  the  coronal  and  sagittal  planes  were  acquired. \par   \par COMPARISON:    CT  abdomen  pelvis  dated  2016  and  MRI  of  pelvis  dated  2016 \par   \par FINDINGS: \par   \par LUNG  BASES:  There  is  an  unchanged  6  mm  groundglass  right  lower  lobe  pulmonary  nodule  (series  4,  image  42). \par LIVER:  Hepatic  steatosis. \par SPLEEN:  Unremarkable. \par PANCREAS:  Unremarkable. \par GALLBLADDER  AND  BILIARY  TREE:  There  is  cholelithiasis.  No  biliary  ductal  dilatation. \par ADRENALS:  Unremarkable. \par KIDNEYS:  Symmetric  enhancement  without  hydronephrosis. \par LYMPH  NODES:  There  are  no  enlarged  abdominal  or  pelvic  lymph  nodes. \par VASCULATURE:  The  abdominal  aorta  is  normal  in  caliber  and  demonstrates  atherosclerotic  changes. \par BOWEL:  Patient  is  post  rectal  anastomosis.  There  is  a  right  lower  quadrant  loop  ileostomy.  No  bowel  obstruction. \par PERITONEUM/RETROPERITONEUM/MESENTERY:  Presacral  edema.  No  ascites  or  pneumoperitoneum. \par PELVIC  VISCERA:  Unremarkable. \par BONES  AND  SOFT  TISSUES:  No  acute  osseous  abnormality  is  noted.  L4-L5  spondylolysis.  Grade  1  anterolisthesis  of  L5  on  S1.  \par   \par IMPRESSION: \par   \par Post  rectal  anastomosis  and  right  lower  quadrant  ileostomy  without  CT  evidence  of  abdominopelvic  residual  disease. \par   \par Unchanged  6  mm  groundglass  right  lower  lobe  pulmonary  nodule. \par   \par \par \par \par Original  final  report  dictated  and  signed  by  Dr. Eric Cristobal  on  2017  03:50  PM

## 2018-10-19 NOTE — ASSESSMENT
[Curative] : Goals of care discussed with patient: Curative [FreeTextEntry1] : 1 Stage III rectal cancer status post chemoradiation and LAR/ileostomy and 4 cycles of adjuvant chemotherapy with XELOX. His CT is showing no evidence of recurrence  and  colonoscopy in around April of 2017 was negative as well as  Repeat CT scans in October of 2017 and Colonoscopy in  May 2018\par -CMP and  CEA today, he is due for CTs now and will order  will jose r with results \par -next colonoscopy may 2021\par \par 2. Decrease white blood cell count as well as macrocytosis his anemia resolved, CBC from today  is stable \par -he had a bone marrow that did not demonstrate any pathology possibly has ICUS or due to ETOH use \par -I suspect this is due this Alcohol use since he claims 3 glasses of wine daily  as and increase MCV may bee seen with Fatty liver as well which he has\par -I recommended he cuts down to one glass of wine a day\par \par \par RTC in 6 monrhs will call with CT results

## 2018-10-19 NOTE — PHYSICAL EXAM
[Fully active, able to carry on all pre-disease performance without restriction] : Status 0 - Fully active, able to carry on all pre-disease performance without restriction [Normal] : affect appropriate [Ulcers] : no ulcers [Mucositis] : no mucositis [Thrush] : no thrush [Vesicles] : no vesicles [de-identified] : surgical scars, small hernia at incision site

## 2018-10-29 ENCOUNTER — FORM ENCOUNTER (OUTPATIENT)
Age: 63
End: 2018-10-29

## 2018-10-30 ENCOUNTER — OUTPATIENT (OUTPATIENT)
Dept: OUTPATIENT SERVICES | Facility: HOSPITAL | Age: 63
LOS: 1 days | Discharge: HOME | End: 2018-10-30

## 2018-10-30 DIAGNOSIS — C20 MALIGNANT NEOPLASM OF RECTUM: ICD-10-CM

## 2018-12-03 ENCOUNTER — APPOINTMENT (OUTPATIENT)
Dept: HEMATOLOGY ONCOLOGY | Facility: CLINIC | Age: 63
End: 2018-12-03

## 2018-12-03 ENCOUNTER — OUTPATIENT (OUTPATIENT)
Dept: OUTPATIENT SERVICES | Facility: HOSPITAL | Age: 63
LOS: 1 days | Discharge: HOME | End: 2018-12-03

## 2018-12-03 ENCOUNTER — LABORATORY RESULT (OUTPATIENT)
Age: 63
End: 2018-12-03

## 2018-12-03 VITALS
BODY MASS INDEX: 23.95 KG/M2 | HEART RATE: 62 BPM | HEIGHT: 68 IN | RESPIRATION RATE: 14 BRPM | TEMPERATURE: 97.6 F | SYSTOLIC BLOOD PRESSURE: 150 MMHG | WEIGHT: 158 LBS | DIASTOLIC BLOOD PRESSURE: 90 MMHG

## 2018-12-03 DIAGNOSIS — C20 MALIGNANT NEOPLASM OF RECTUM: ICD-10-CM

## 2018-12-03 DIAGNOSIS — D72.819 DECREASED WHITE BLOOD CELL COUNT, UNSPECIFIED: ICD-10-CM

## 2018-12-06 LAB
ALBUMIN SERPL ELPH-MCNC: 5.1 G/DL
ALP BLD-CCNC: 55 U/L
ALT SERPL-CCNC: 28 U/L
ANION GAP SERPL CALC-SCNC: 18 MMOL/L
AST SERPL-CCNC: 29 U/L
BILIRUB SERPL-MCNC: 0.6 MG/DL
BUN SERPL-MCNC: 14 MG/DL
CALCIUM SERPL-MCNC: 10.1 MG/DL
CEA SERPL-MCNC: 1.9 NG/ML
CHLORIDE SERPL-SCNC: 98 MMOL/L
CO2 SERPL-SCNC: 24 MMOL/L
CREAT SERPL-MCNC: 1.2 MG/DL
GLUCOSE SERPL-MCNC: 94 MG/DL
HCT VFR BLD CALC: 40 %
HGB BLD-MCNC: 14 G/DL
MCHC RBC-ENTMCNC: 35 G/DL
MCHC RBC-ENTMCNC: 35.4 PG
MCV RBC AUTO: 101.3 FL
PLATELET # BLD AUTO: 204 K/UL
PMV BLD: 9.6 FL
POTASSIUM SERPL-SCNC: 4.1 MMOL/L
PROT SERPL-MCNC: 7.6 G/DL
RBC # BLD: 3.95 M/UL
RBC # FLD: 11.5 %
SODIUM SERPL-SCNC: 140 MMOL/L
WBC # FLD AUTO: 3.53 K/UL

## 2018-12-07 NOTE — HISTORY OF PRESENT ILLNESS
[Disease: _____________________] : Disease: [unfilled] [T: ___] : T[unfilled] [N: ___] : N[unfilled] [M: ___] : M[unfilled] [AJCC Stage: ____] : AJCC Stage: [unfilled] [de-identified] : Patient is a 60 year old male who was diagnosed with rectal carcinoma after symptoms of hematochezia, tenesmus. He then underwent colonoscopy with Dr. Dory Carrillo on 04/22/2016, that showed an obstructing circumferential mass at 10 cm from anal verge consistent with adenocarcinoma. Patient was given chemoradiation with xeloda and he finsihed it then underwent laparoscopic LAR with TME and diverting loop ileostomy on 8/30/2016. Patient recovered from surgery except for hospitalization with dehydration from diarrhea postoperatively that resolved. He was started on adjuvant xelox on 10/4/2016 and completed 4 cycles in December of 2016. [de-identified] : adenocarcinoma [de-identified] :   adenocarcinoma, low grade, well differentiated.  Tumor invades through the muscularis propria into subserosal adipose tissue   \par  [de-identified] : 5/9/17\par He presents to establish care. He is to have his ostomy reversed but was noted to have low WBC on blood work. See bellprincess. Due to his cytopenia  he had a bone marrow biopsy in 2/2017 that showed hypocellular marrrow. Cytogenetics and FISH was negative.\par   He had a CT abd/pelvis on 3/2017 that showed Post rectal anastomosis and right lower quadrant ileostomy without CT evidence of abdominopelvic residual disease.Unchanged 6 mm ground-glass right lower lobe pulmonary nodule. \par \par He had a colonoscopy on April 10th by Dr. Handley and reports it was normal.  He was to have his ostomy reversed by Timmy Arriaga but he was not cleared by his  PMD Dr. Earline Acosta due to low WBC. Repeat CBC showed improvement.\par \par 7/17/17\par He is doing well. He has mild grade 1 neuropathy in feet that he barely feels. His bowel movements are improving.  No weight loss, no bleeding. Ostomy was reversed. .\par \par 8/2/17\par I brought him back for follow up do tof all in his HgB. He states he feels well except for chronic neuropathy. Stools are brown and urine is yellow.\par \par 10/11/17\par He is here for follow. He feels well.  He was started on Oral b12 tabs since his B12 level was low. He has no complaints.\par \par 1/10/18\par He is here for follow up.  He has a CT C/A/P on 10/20/17 that showed stable pulmonary nodules and No evidence of metastatic disease within the abdomen or pelvis.  CEA was 2. He feels well. Was started back on his ACEi for HTN. Normal bowel movements. No bleeding. He stopped his b12 \par \par 7/25/18\par He is here for follow up. He had an US liver on 5/2018 that showed fatty liver. Colonosocpy on 5/2018 by Dr. Handley  shwoed only  mild radiation proctitis next colonosocy in 3 years May 2021. Blood work quest in May 2018  CMP normal, Cholesterol was a bit high. WBC 3.6, HgB 13.9, , Lymphs low 842, Plts 199\par He states he feels fine. HE admits to drinking 3 glasses of wine every night. \par \par 10/17/18\par He is here for follow up. 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  He is feeling well with no complaints.  Patient denies fever, chills, nausea, vomiting. CEA is stable.\par CT C/A/P 10/30/18 shows New solid left upper lobe pulmonary nodule measuring 5 mm. He has no complaints

## 2018-12-07 NOTE — PHYSICAL EXAM
[Fully active, able to carry on all pre-disease performance without restriction] : Status 0 - Fully active, able to carry on all pre-disease performance without restriction [Normal] : affect appropriate [Ulcers] : no ulcers [Mucositis] : no mucositis [Thrush] : no thrush [Vesicles] : no vesicles [de-identified] : surgical scars, small hernia at incision site

## 2018-12-07 NOTE — ASSESSMENT
[Curative] : Goals of care discussed with patient: Curative [FreeTextEntry1] : 1 Stage III rectal cancer status post chemoradiation and LAR/ileostomy and 4 cycles of adjuvant chemotherapy with XELOX. His CT is showing no evidence of recurrence  and  colonoscopy in around April of 2017 was negative as well as  Repeat CT scans in October of 2017 and Colonoscopy in  May 2018\par -CMP and  CEA today, previous CTs showed new 5 mm nodule \par -next colonoscopy may 2021\par -Repeat CT Chest with contrast at the end of December to evaluate the new nodule , depending on results will proceed from there\par \par 2. Decrease white blood cell count as well as macrocytosis his anemia resolved, CBC from today  is stable \par -he had a bone marrow that did not demonstrate any pathology possibly has ICUS or due to ETOH use \par -I suspect this is due this Alcohol use since he claims 3 glasses of wine daily  as and increase MCV may bee seen with Fatty liver as well which he has\par -I recommended he cuts down to one glass of wine a day\par \par \par Will call with CT results of chest.

## 2019-01-02 ENCOUNTER — FORM ENCOUNTER (OUTPATIENT)
Age: 64
End: 2019-01-02

## 2019-01-03 ENCOUNTER — OUTPATIENT (OUTPATIENT)
Dept: OUTPATIENT SERVICES | Facility: HOSPITAL | Age: 64
LOS: 1 days | Discharge: HOME | End: 2019-01-03

## 2019-01-03 DIAGNOSIS — R91.8 OTHER NONSPECIFIC ABNORMAL FINDING OF LUNG FIELD: ICD-10-CM

## 2019-02-13 ENCOUNTER — APPOINTMENT (OUTPATIENT)
Dept: HEMATOLOGY ONCOLOGY | Facility: CLINIC | Age: 64
End: 2019-02-13

## 2019-02-13 ENCOUNTER — LABORATORY RESULT (OUTPATIENT)
Age: 64
End: 2019-02-13

## 2019-02-13 ENCOUNTER — OUTPATIENT (OUTPATIENT)
Dept: OUTPATIENT SERVICES | Facility: HOSPITAL | Age: 64
LOS: 1 days | Discharge: HOME | End: 2019-02-13

## 2019-02-13 VITALS
HEART RATE: 58 BPM | WEIGHT: 158 LBS | SYSTOLIC BLOOD PRESSURE: 150 MMHG | DIASTOLIC BLOOD PRESSURE: 70 MMHG | HEIGHT: 68 IN | BODY MASS INDEX: 23.95 KG/M2 | RESPIRATION RATE: 14 BRPM | TEMPERATURE: 97.1 F

## 2019-02-14 LAB
ALBUMIN SERPL ELPH-MCNC: 4.8 G/DL
ALP BLD-CCNC: 54 U/L
ALT SERPL-CCNC: 26 U/L
ANION GAP SERPL CALC-SCNC: 19 MMOL/L
AST SERPL-CCNC: 29 U/L
BILIRUB SERPL-MCNC: 0.5 MG/DL
BUN SERPL-MCNC: 14 MG/DL
CALCIUM SERPL-MCNC: 10 MG/DL
CEA SERPL-MCNC: 1.8 NG/ML
CHLORIDE SERPL-SCNC: 99 MMOL/L
CO2 SERPL-SCNC: 24 MMOL/L
CREAT SERPL-MCNC: 1.1 MG/DL
GLUCOSE SERPL-MCNC: 92 MG/DL
HCT VFR BLD CALC: 39 %
HGB BLD-MCNC: 13.4 G/DL
MCHC RBC-ENTMCNC: 34.4 G/DL
MCHC RBC-ENTMCNC: 35.3 PG
MCV RBC AUTO: 102.6 FL
PLATELET # BLD AUTO: 210 K/UL
PMV BLD: 9.6 FL
POTASSIUM SERPL-SCNC: 4.4 MMOL/L
PROT SERPL-MCNC: 7.3 G/DL
RBC # BLD: 3.8 M/UL
RBC # FLD: 11.7 %
SODIUM SERPL-SCNC: 142 MMOL/L
WBC # FLD AUTO: 3.36 K/UL

## 2019-02-15 DIAGNOSIS — C20 MALIGNANT NEOPLASM OF RECTUM: ICD-10-CM

## 2019-02-15 NOTE — ASSESSMENT
[Curative] : Goals of care discussed with patient: Curative [FreeTextEntry1] : 1 Stage III rectal cancer status post chemoradiation and LAR/ileostomy and 4 cycles of adjuvant chemotherapy with XELOX. His CT is showing no evidence of recurrence  and  colonoscopy in around April of 2017 was negative as well as  Repeat CT scans in October of 2017 and Colonoscopy in  May 2018\par - CEA today, previous CT showed stable 5 mm nodule \par - next colonoscopy may 2021\par \par 2. Decrease white blood cell count as well as macrocytosis and mild  anemia CBC from today  is stable \par - he had a bone marrow that did not demonstrate any pathology possibly has ICUS or due to ETOH use \par - I suspect this is due this Alcohol use since he claims 3 glasses of wine daily  as and increase MCV may bee seen with Fatty liver as well which he has\par - I recommended he cuts down to one glass of wine a day\par \par RTC in 5 months, will repeat CT C/A/P at that time

## 2019-02-15 NOTE — PHYSICAL EXAM
[Fully active, able to carry on all pre-disease performance without restriction] : Status 0 - Fully active, able to carry on all pre-disease performance without restriction [Normal] : affect appropriate [Ulcers] : no ulcers [Mucositis] : no mucositis [Thrush] : no thrush [Vesicles] : no vesicles [de-identified] : surgical scars, small hernia at incision site

## 2019-02-15 NOTE — RESULTS/DATA
[FreeTextEntry1] : [facility]  [facility],   \par [address]  [place] [address]        [phone] \par \par Patient  Name:  ASIM STREET  :  1955 \par Patient  ID:  [id number] \par Account:  [id number] \par Patient  Location:  [facility] \par \par Accession:  [id number] \par Procedure:  CT  ABDOMEN  PELVIS  W  IV  AND  PO  CONTRAST  [id number] \par Date  of  Exam:  2017  01:52  PM \par Attending  Physician:  ALEX CONSTANTINO \par Requesting  Physician:  ALEX CONSTANTINO MD \par \par \par CLINICAL  STATEMENT:  Rectal  cancer  post  chemotherapy,  radiation  therapy  and  surgery.    \par   \par TECHNIQUE:  Contiguous  axial  CT  images  were  obtained  from  the  lung  bases  to  the  pubic  symphysis  following  administration  of  100cc  Optiray  320  intravenous  contrast.    Oral  contrast  was  administered.    Reformatted  images  in  the  coronal  and  sagittal  planes  were  acquired. \par   \par COMPARISON:    CT  abdomen  pelvis  dated  2016  and  MRI  of  pelvis  dated  2016 \par   \par FINDINGS: \par   \par LUNG  BASES:  There  is  an  unchanged  6  mm  groundglass  right  lower  lobe  pulmonary  nodule  (series  4,  image  42). \par LIVER:  Hepatic  steatosis. \par SPLEEN:  Unremarkable. \par PANCREAS:  Unremarkable. \par GALLBLADDER  AND  BILIARY  TREE:  There  is  cholelithiasis.  No  biliary  ductal  dilatation. \par ADRENALS:  Unremarkable. \par KIDNEYS:  Symmetric  enhancement  without  hydronephrosis. \par LYMPH  NODES:  There  are  no  enlarged  abdominal  or  pelvic  lymph  nodes. \par VASCULATURE:  The  abdominal  aorta  is  normal  in  caliber  and  demonstrates  atherosclerotic  changes. \par BOWEL:  Patient  is  post  rectal  anastomosis.  There  is  a  right  lower  quadrant  loop  ileostomy.  No  bowel  obstruction. \par PERITONEUM/RETROPERITONEUM/MESENTERY:  Presacral  edema.  No  ascites  or  pneumoperitoneum. \par PELVIC  VISCERA:  Unremarkable. \par BONES  AND  SOFT  TISSUES:  No  acute  osseous  abnormality  is  noted.  L4-L5  spondylolysis.  Grade  1  anterolisthesis  of  L5  on  S1.  \par   \par IMPRESSION: \par   \par Post  rectal  anastomosis  and  right  lower  quadrant  ileostomy  without  CT  evidence  of  abdominopelvic  residual  disease. \par   \par Unchanged  6  mm  groundglass  right  lower  lobe  pulmonary  nodule. \par   \par \par \par \par Original  final  report  dictated  and  signed  by  Dr. Eric Cristobal  on  2017  03:50  PM

## 2019-02-15 NOTE — HISTORY OF PRESENT ILLNESS
[Disease: _____________________] : Disease: [unfilled] [T: ___] : T[unfilled] [N: ___] : N[unfilled] [M: ___] : M[unfilled] [AJCC Stage: ____] : AJCC Stage: [unfilled] [de-identified] : Patient is a 60 year old male who was diagnosed with rectal carcinoma after symptoms of hematochezia, tenesmus. He then underwent colonoscopy with Dr. Dory Carrillo on 04/22/2016, that showed an obstructing circumferential mass at 10 cm from anal verge consistent with adenocarcinoma. Patient was given chemoradiation with xeloda and he finsihed it then underwent laparoscopic LAR with TME and diverting loop ileostomy on 8/30/2016. Patient recovered from surgery except for hospitalization with dehydration from diarrhea postoperatively that resolved. He was started on adjuvant xelox on 10/4/2016 and completed 4 cycles in December of 2016. [de-identified] : adenocarcinoma [de-identified] :   adenocarcinoma, low grade, well differentiated.  Tumor invades through the muscularis propria into subserosal adipose tissue   \par  [de-identified] : 5/9/17\par He presents to establish care. He is to have his ostomy reversed but was noted to have low WBC on blood work. See bellprincess. Due to his cytopenia  he had a bone marrow biopsy in 2/2017 that showed hypocellular marrrow. Cytogenetics and FISH was negative.\par   He had a CT abd/pelvis on 3/2017 that showed Post rectal anastomosis and right lower quadrant ileostomy without CT evidence of abdominopelvic residual disease.Unchanged 6 mm ground-glass right lower lobe pulmonary nodule. \par \par He had a colonoscopy on April 10th by Dr. Handley and reports it was normal.  He was to have his ostomy reversed by Timmy Arriaga but he was not cleared by his  PMD Dr. Earline Acosta due to low WBC. Repeat CBC showed improvement.\par \par 7/17/17\par He is doing well. He has mild grade 1 neuropathy in feet that he barely feels. His bowel movements are improving.  No weight loss, no bleeding. Ostomy was reversed. .\par \par 8/2/17\par I brought him back for follow up do tof all in his HgB. He states he feels well except for chronic neuropathy. Stools are brown and urine is yellow.\par \par 10/11/17\par He is here for follow. He feels well.  He was started on Oral b12 tabs since his B12 level was low. He has no complaints.\par \par 1/10/18\par He is here for follow up.  He has a CT C/A/P on 10/20/17 that showed stable pulmonary nodules and No evidence of metastatic disease within the abdomen or pelvis.  CEA was 2. He feels well. Was started back on his ACEi for HTN. Normal bowel movements. No bleeding. He stopped his b12 \par \par 7/25/18\par He is here for follow up. He had an US liver on 5/2018 that showed fatty liver. Colonosocpy on 5/2018 by Dr. Handley  shwoed only  mild radiation proctitis next colonosocy in 3 years May 2021. Blood work [facility] in May 2018  CMP normal, Cholesterol was a bit high. WBC 3.6, HgB 13.9, , Lymphs low 842, Plts 199\par He states he feels fine. HE admits to drinking 3 glasses of wine every night. \par \par 10/17/18\par He is here for follow up. Blodo work from [facility] 8/8/18 BNP normal,  LFT normal,  Wbc 3.5 normal diff, hgb 13.3, PSA 1.5 .7\par \par 12/3/18\par Patient is here for a follow-up visit.  He is feeling well with no complaints.  Patient denies fever, chills, nausea, vomiting. CEA is stable.\par CT C/A/P 10/30/18 shows New solid left upper lobe pulmonary nodule measuring 5 mm. He has no complaints  \par \par 2/13/19\par Patient is here for a follow-up visit.  He is feeling well with no complaints.  Patient denies fever, chills, nausea, vomiting.  Most recent CEA from 12/6/18 is stable at 1.9ng/mL.  He has restarted Vitamin B12.  \par CT Chest 1/3/19 IMPRESSION: 1. 5 mm left upper lobe solid pulmonary nodule (4/116), stable since 10/30/2018 increased in size since 10/20/2017. Continued follow-up is recommended. 2. Additional bilateral subcentimeter nodules are stable since 5/11/2016. 3. No new suspicious mass or nodule. \par Labs 1/15/19 Normal CMP, TSH 9.68, Ferritin 393, WBC 3.4, Hgb 13.5, , , Folate 8

## 2019-06-12 ENCOUNTER — LABORATORY RESULT (OUTPATIENT)
Age: 64
End: 2019-06-12

## 2019-06-12 ENCOUNTER — OUTPATIENT (OUTPATIENT)
Dept: OUTPATIENT SERVICES | Facility: HOSPITAL | Age: 64
LOS: 1 days | Discharge: HOME | End: 2019-06-12

## 2019-06-12 ENCOUNTER — APPOINTMENT (OUTPATIENT)
Dept: HEMATOLOGY ONCOLOGY | Facility: CLINIC | Age: 64
End: 2019-06-12
Payer: MEDICARE

## 2019-06-12 VITALS
SYSTOLIC BLOOD PRESSURE: 128 MMHG | HEIGHT: 68 IN | DIASTOLIC BLOOD PRESSURE: 70 MMHG | TEMPERATURE: 97.9 F | RESPIRATION RATE: 14 BRPM | HEART RATE: 62 BPM | WEIGHT: 154 LBS | BODY MASS INDEX: 23.34 KG/M2

## 2019-06-12 LAB
ALBUMIN SERPL ELPH-MCNC: 4.4 G/DL
ALP BLD-CCNC: 58 U/L
ALT SERPL-CCNC: 23 U/L
ANION GAP SERPL CALC-SCNC: 15 MMOL/L
AST SERPL-CCNC: 24 U/L
BILIRUB SERPL-MCNC: 0.3 MG/DL
BUN SERPL-MCNC: 15 MG/DL
CALCIUM SERPL-MCNC: 9.5 MG/DL
CHLORIDE SERPL-SCNC: 99 MMOL/L
CO2 SERPL-SCNC: 24 MMOL/L
CREAT SERPL-MCNC: 1.1 MG/DL
GLUCOSE SERPL-MCNC: 93 MG/DL
HCT VFR BLD CALC: 39.6 %
HGB BLD-MCNC: 13.7 G/DL
MCHC RBC-ENTMCNC: 34.6 G/DL
MCHC RBC-ENTMCNC: 34.9 PG
MCV RBC AUTO: 100.8 FL
PLATELET # BLD AUTO: 202 K/UL
PMV BLD: 9.3 FL
POTASSIUM SERPL-SCNC: 4.3 MMOL/L
PROT SERPL-MCNC: 7 G/DL
RBC # BLD: 3.93 M/UL
RBC # FLD: 11.7 %
SODIUM SERPL-SCNC: 138 MMOL/L
WBC # FLD AUTO: 3.08 K/UL

## 2019-06-12 PROCEDURE — 99213 OFFICE O/P EST LOW 20 MIN: CPT

## 2019-06-12 NOTE — REVIEW OF SYSTEMS
[Negative] : Integumentary [Fever] : no fever [Fatigue] : no fatigue [Recent Change In Weight] : ~T no recent weight change [Chest Pain] : no chest pain [Shortness Of Breath] : no shortness of breath [Abdominal Pain] : no abdominal pain [Vomiting] : no vomiting [Diarrhea] : no diarrhea [Dysuria] : no dysuria [Joint Pain] : no joint pain [Skin Rash] : no skin rash [FreeTextEntry7] : He has a hernia

## 2019-06-12 NOTE — PHYSICAL EXAM
[Fully active, able to carry on all pre-disease performance without restriction] : Status 0 - Fully active, able to carry on all pre-disease performance without restriction [Normal] : affect appropriate [Ulcers] : no ulcers [Mucositis] : no mucositis [Thrush] : no thrush [Vesicles] : no vesicles [de-identified] : surgical scars, small hernia at incision site

## 2019-06-12 NOTE — ASSESSMENT
[Curative] : Goals of care discussed with patient: Curative [FreeTextEntry1] : 1 Stage III rectal cancer status post chemoradiation and LAR/ileostomy and 4 cycles of adjuvant chemotherapy with XELOX. His CT is showing no evidence of recurrence  and  colonoscopy in around April of 2017 was negative as well as  Repeat CT scans in October of 2017 and Colonoscopy in  May 2018\par - CEA, CBC, CMP today, previous CT showed stable 5 mm nodule  his CT abdomen is due in October. Will recheck CT C/A/P in August if LIZZ and stable nodule will rescan in one year\par - next colonoscopy may 2021\par \par 2. Decrease white blood cell count as well as macrocytosis and mild  anemia   is stable \par - he had a bone marrow that did not demonstrate any pathology possibly has ICUS or due to ETOH use \par - I suspect this is due this Alcohol use since he claims 3 glasses of wine daily  as and increase MCV may bee seen with Fatty liver as well which he has\par - I recommended he cuts down to one glass of wine a day\par \par RTC in 6 months

## 2019-06-12 NOTE — RESULTS/DATA
[FreeTextEntry1] : [facility],   \par [address]  [place] [address]        [phone] \par \par Patient  Name:  ASIM STREET  :  1955 \par Patient  ID:  [id number] \par Account:  [id number] \par Patient  Location:  [facility] \par \par Accession:  [id number] \par Procedure:  CT  ABDOMEN  PELVIS  W  IV  AND  PO  CONTRAST  [id number] \par Date  of  Exam:  2017  01:52  PM \par Attending  Physician:  ALEX CONSTANTINO \par Requesting  Physician:  ALEX CONSTANTINO MD \par \par \par CLINICAL  STATEMENT:  Rectal  cancer  post  chemotherapy,  radiation  therapy  and  surgery.    \par   \par TECHNIQUE:  Contiguous  axial  CT  images  were  obtained  from  the  lung  bases  to  the  pubic  symphysis  following  administration  of  100cc  Optiray  320  intravenous  contrast.    Oral  contrast  was  administered.    Reformatted  images  in  the  coronal  and  sagittal  planes  were  acquired. \par   \par COMPARISON:    CT  abdomen  pelvis  dated  2016  and  MRI  of  pelvis  dated  2016 \par   \par FINDINGS: \par   \par LUNG  BASES:  There  is  an  unchanged  6  mm  groundglass  right  lower  lobe  pulmonary  nodule  (series  4,  image  42). \par LIVER:  Hepatic  steatosis. \par SPLEEN:  Unremarkable. \par PANCREAS:  Unremarkable. \par GALLBLADDER  AND  BILIARY  TREE:  There  is  cholelithiasis.  No  biliary  ductal  dilatation. \par ADRENALS:  Unremarkable. \par KIDNEYS:  Symmetric  enhancement  without  hydronephrosis. \par LYMPH  NODES:  There  are  no  enlarged  abdominal  or  pelvic  lymph  nodes. \par VASCULATURE:  The  abdominal  aorta  is  normal  in  caliber  and  demonstrates  atherosclerotic  changes. \par BOWEL:  Patient  is  post  rectal  anastomosis.  There  is  a  right  lower  quadrant  loop  ileostomy.  No  bowel  obstruction. \par PERITONEUM/RETROPERITONEUM/MESENTERY:  Presacral  edema.  No  ascites  or  pneumoperitoneum. \par PELVIC  VISCERA:  Unremarkable. \par BONES  AND  SOFT  TISSUES:  No  acute  osseous  abnormality  is  noted.  L4-L5  spondylolysis.  Grade  1  anterolisthesis  of  L5  on  S1.  \par   \par IMPRESSION: \par   \par Post  rectal  anastomosis  and  right  lower  quadrant  ileostomy  without  CT  evidence  of  abdominopelvic  residual  disease. \par   \par Unchanged  6  mm  groundglass  right  lower  lobe  pulmonary  nodule. \par   \par \par \par \par Original  final  report  dictated  and  signed  by  Dr. Eric Cristobal  on  2017  03:50  PM

## 2019-06-12 NOTE — HISTORY OF PRESENT ILLNESS
[T: ___] : T[unfilled] [Disease: _____________________] : Disease: [unfilled] [N: ___] : N[unfilled] [M: ___] : M[unfilled] [AJCC Stage: ____] : AJCC Stage: [unfilled] [de-identified] : Patient is a 60 year old male who was diagnosed with rectal carcinoma after symptoms of hematochezia, tenesmus. He then underwent colonoscopy with Dr. Dory Carrillo on 04/22/2016, that showed an obstructing circumferential mass at 10 cm from anal verge consistent with adenocarcinoma. Patient was given chemoradiation with xeloda and he finsihed it then underwent laparoscopic LAR with TME and diverting loop ileostomy on 8/30/2016. Patient recovered from surgery except for hospitalization with dehydration from diarrhea postoperatively that resolved. He was started on adjuvant xelox on 10/4/2016 and completed 4 cycles in December of 2016. [de-identified] : adenocarcinoma [de-identified] :   adenocarcinoma, low grade, well differentiated.  Tumor invades through the muscularis propria into subserosal adipose tissue   \par  [de-identified] : 5/9/17\par He presents to establish care. He is to have his ostomy reversed but was noted to have low WBC on blood work. See bellprincess. Due to his cytopenia  he had a bone marrow biopsy in 2/2017 that showed hypocellular marrrow. Cytogenetics and FISH was negative.\par   He had a CT abd/pelvis on 3/2017 that showed Post rectal anastomosis and right lower quadrant ileostomy without CT evidence of abdominopelvic residual disease.Unchanged 6 mm ground-glass right lower lobe pulmonary nodule. \par \par He had a colonoscopy on April 10th by Dr. Handley and reports it was normal.  He was to have his ostomy reversed by Timmy Arriaga but he was not cleared by his  PMD Dr. Earline Acosta due to low WBC. Repeat CBC showed improvement.\par \par 7/17/17\par He is doing well. He has mild grade 1 neuropathy in feet that he barely feels. His bowel movements are improving.  No weight loss, no bleeding. Ostomy was reversed. .\par \par 8/2/17\par I brought him back for follow up do tof all in his HgB. He states he feels well except for chronic neuropathy. Stools are brown and urine is yellow.\par \par 10/11/17\par He is here for follow. He feels well.  He was started on Oral b12 tabs since his B12 level was low. He has no complaints.\par \par 1/10/18\par He is here for follow up.  He has a CT C/A/P on 10/20/17 that showed stable pulmonary nodules and No evidence of metastatic disease within the abdomen or pelvis.  CEA was 2. He feels well. Was started back on his ACEi for HTN. Normal bowel movements. No bleeding. He stopped his b12 \par \par 7/25/18\par He is here for follow up. He had an US liver on 5/2018 that showed fatty liver. Colonosocpy on 5/2018 by Dr. Handley  shwoed only  mild radiation proctitis next colonosocy in 3 years May 2021. Blood work [facility] in May 2018  CMP normal, Cholesterol was a bit high. WBC 3.6, HgB 13.9, , Lymphs low 842, Plts 199\par He states he feels fine. HE admits to drinking 3 glasses of wine every night. \par \par 10/17/18\par He is here for follow up. Blodo work from [facility] 8/8/18 BNP normal,  LFT normal,  Wbc 3.5 normal diff, hgb 13.3, PSA 1.5 .7\par \par 12/3/18\par Patient is here for a follow-up visit.  He is feeling well with no complaints.  Patient denies fever, chills, nausea, vomiting. CEA is stable.\par CT C/A/P 10/30/18 shows New solid left upper lobe pulmonary nodule measuring 5 mm. He has no complaints  \par \par 2/13/19\par Patient is here for a follow-up visit.  He is feeling well with no complaints.  Patient denies fever, chills, nausea, vomiting.  Most recent CEA from 12/6/18 is stable at 1.9ng/mL.  He has restarted Vitamin B12.  \par CT Chest 1/3/19 IMPRESSION: 1. 5 mm left upper lobe solid pulmonary nodule (4/116), stable since 10/30/2018 increased in size since 10/20/2017. Continued follow-up is recommended. 2. Additional bilateral subcentimeter nodules are stable since 5/11/2016. 3. No new suspicious mass or nodule. \par Labs 1/15/19 Normal CMP, TSH 9.68, Ferritin 393, WBC 3.4, Hgb 13.5, , , Folate 8\par \par 6/12/19\par He is here for follow-up of Rectal cancer.   He is doing well. he has no complaints. Still drinks 2-3 glasses of wine every night.

## 2019-06-13 LAB — CEA SERPL-MCNC: 1.7 NG/ML

## 2019-06-17 DIAGNOSIS — R91.8 OTHER NONSPECIFIC ABNORMAL FINDING OF LUNG FIELD: ICD-10-CM

## 2019-06-17 DIAGNOSIS — C20 MALIGNANT NEOPLASM OF RECTUM: ICD-10-CM

## 2019-08-25 ENCOUNTER — FORM ENCOUNTER (OUTPATIENT)
Age: 64
End: 2019-08-25

## 2019-08-26 ENCOUNTER — OUTPATIENT (OUTPATIENT)
Dept: OUTPATIENT SERVICES | Facility: HOSPITAL | Age: 64
LOS: 1 days | Discharge: HOME | End: 2019-08-26
Payer: MEDICARE

## 2019-08-26 DIAGNOSIS — C20 MALIGNANT NEOPLASM OF RECTUM: ICD-10-CM

## 2019-08-26 PROCEDURE — 74177 CT ABD & PELVIS W/CONTRAST: CPT | Mod: 26

## 2019-08-26 PROCEDURE — 71260 CT THORAX DX C+: CPT | Mod: 26

## 2019-08-29 ENCOUNTER — RESULT REVIEW (OUTPATIENT)
Age: 64
End: 2019-08-29

## 2019-08-29 DIAGNOSIS — R91.8 OTHER NONSPECIFIC ABNORMAL FINDING OF LUNG FIELD: ICD-10-CM

## 2019-09-02 ENCOUNTER — FORM ENCOUNTER (OUTPATIENT)
Age: 64
End: 2019-09-02

## 2019-09-02 DIAGNOSIS — Z79.899 OTHER LONG TERM (CURRENT) DRUG THERAPY: ICD-10-CM

## 2019-09-03 ENCOUNTER — OTHER (OUTPATIENT)
Age: 64
End: 2019-09-03

## 2019-09-03 ENCOUNTER — OUTPATIENT (OUTPATIENT)
Dept: OUTPATIENT SERVICES | Facility: HOSPITAL | Age: 64
LOS: 1 days | Discharge: HOME | End: 2019-09-03
Payer: MEDICARE

## 2019-09-03 DIAGNOSIS — R91.8 OTHER NONSPECIFIC ABNORMAL FINDING OF LUNG FIELD: ICD-10-CM

## 2019-09-03 DIAGNOSIS — C20 MALIGNANT NEOPLASM OF RECTUM: ICD-10-CM

## 2019-09-03 LAB — GLUCOSE BLDC GLUCOMTR-MCNC: 85 MG/DL — SIGNIFICANT CHANGE UP (ref 70–99)

## 2019-09-03 PROCEDURE — 78815 PET IMAGE W/CT SKULL-THIGH: CPT | Mod: 26,PI

## 2019-09-04 ENCOUNTER — APPOINTMENT (OUTPATIENT)
Age: 64
End: 2019-09-04
Payer: MEDICARE

## 2019-09-04 VITALS
SYSTOLIC BLOOD PRESSURE: 162 MMHG | HEART RATE: 70 BPM | BODY MASS INDEX: 22.66 KG/M2 | RESPIRATION RATE: 13 BRPM | TEMPERATURE: 98.5 F | HEIGHT: 69 IN | OXYGEN SATURATION: 97 % | WEIGHT: 153 LBS | DIASTOLIC BLOOD PRESSURE: 92 MMHG

## 2019-09-04 DIAGNOSIS — Z86.39 PERSONAL HISTORY OF OTHER ENDOCRINE, NUTRITIONAL AND METABOLIC DISEASE: ICD-10-CM

## 2019-09-04 DIAGNOSIS — Z78.9 OTHER SPECIFIED HEALTH STATUS: ICD-10-CM

## 2019-09-04 PROCEDURE — 99204 OFFICE O/P NEW MOD 45 MIN: CPT

## 2019-09-04 RX ORDER — CYANOCOBALAMIN (VITAMIN B-12) 2000 MCG
2000 TABLET, EXTENDED RELEASE ORAL DAILY
Qty: 90 | Refills: 3 | Status: DISCONTINUED | COMMUNITY
Start: 2017-08-03 | End: 2019-09-04

## 2019-09-04 NOTE — PHYSICAL EXAM
[General Appearance - Alert] : alert [General Appearance - In No Acute Distress] : in no acute distress [Sclera] : the sclera and conjunctiva were normal [PERRL With Normal Accommodation] : pupils were equal in size, round, and reactive to light [Extraocular Movements] : extraocular movements were intact [] : no respiratory distress [Respiration, Rhythm And Depth] : normal respiratory rhythm and effort [Heart Sounds] : normal S1 and S2 [Heart Rate And Rhythm] : heart rate was normal and rhythm regular [Heart Sounds Gallop] : no gallops [Murmurs] : no murmurs [Heart Sounds Pericardial Friction Rub] : no pericardial rub [Abnormal Walk] : normal gait [Nail Clubbing] : no clubbing  or cyanosis of the fingernails [Musculoskeletal - Swelling] : no joint swelling seen [Motor Tone] : muscle strength and tone were normal [Deep Tendon Reflexes (DTR)] : deep tendon reflexes were 2+ and symmetric [Sensation] : the sensory exam was normal to light touch and pinprick [No Focal Deficits] : no focal deficits [Oriented To Time, Place, And Person] : oriented to person, place, and time [Affect] : the affect was normal [Impaired Insight] : insight and judgment were intact

## 2019-09-05 PROBLEM — Z86.39 HISTORY OF HIGH CHOLESTEROL: Status: RESOLVED | Noted: 2019-09-05 | Resolved: 2019-09-05

## 2019-09-05 PROBLEM — Z86.39 HISTORY OF DIABETES MELLITUS: Status: RESOLVED | Noted: 2019-09-05 | Resolved: 2019-09-05

## 2019-09-06 NOTE — HISTORY OF PRESENT ILLNESS
[Dyslipidemia] : Dyslipidemia [Hypertension] : Hypertension [FreeTextEntry1] : Mr. ASIM STREET is a 64 year M who presents to our office today for a consultation for a lung nodule referred to our office by Dr. Cage\par \par ECOG/WHO/Zubrod - 0 - Fully active, able to carry out all pre-disease performance without restrictions\par \par His healthcare team is as follows:\par PMD: Earline Acosta\par [facility] - [facility]\par Pulm: non\par EPS: none\par Hem/onc: Fauzia\par Renal: none\par Endocrine: none\par GI: [facility]\par Gen. Sx: Ferzli\par  [Diabetes Mellitus] : no Diabetes Melllitus [Dialysis] : no dialysis [Home Oxygen] : no home oxygen use [Infectious Endocarditis] : no infectious endocarditis [Inhaled Medication Therapy] : no inhaled medication therapy [Sleep Apnea] : no sleep apnea [Liver Disease] : no liver disease [Immunocompromise Present] : not immunocompromised [Peripheral Artery Disease] : no peripheral artery disease [Unresponsive Neurologic State] : not in a unresponsive neurologic state [Syncope] : no syncope [Prior CVA] : with no prior CVA [Cerebrovascular Disease] : no cerebrovascular disease [Prior Myocardial Infarction] : No prior myocardial infarction [CVD TIA] : no CVD Tia [Prior Heart Failure] : no prior heart failure

## 2019-09-06 NOTE — DATA REVIEWED
[FreeTextEntry1] : EXAM: PETCT HERNAN ONC FDG INIT -  09/03/2019 \par \par INTERPRETATION: \par FDG PET CT STUDY Initial treatment strategy \par REASON: TUMOR IMAGING - PET with concurrently acquired CT for attenuation correction and anatomic localization; skull base to mid - thigh . \par \par CPT code 36697. \par \par Fasting blood glucose level: 88 mg/dl \par \par HISTORY: History of stage III rectal cancer now with growing pulmonary nodule. Last chemotherapy in January 2017. Last radiation therapy in June 2016. \par \par TECHNIQUE: Approximately 45 minutes after the intravenous administration of 10.0 mCi 18-Fluorine FDG, whole body PET images were acquired from base of skull to mid - thigh. \par \par CT protocol used for this PET/CT study is designed for attenuation correction and anatomic localization of PET findings. This  CT is not designed to replace state-of-the-art diagnostic CT scans for specific imaging protocols of different body parts. \par \par COMPARISON : None. \par Correlation: CT of the chest abdomen and pelvis on 8/26/2019. \par \par FINDINGS: \par \par Head/Neck: No biologically active head/neck lesions. \par Normal uptake within the brain, pharyngeal lymphoid tissue, salivary glands and laryngeal musculature is noted. \par \par Thorax: \par \par 7 mm left upper lobe solid nodule is FDG avid, SUV max 4.5. No other abnormal uptake seen within the thorax. Previously described 6 mm groundglass nodule in the right lower lobe is poorly visualized on this \par localizer CT. \par \par Abdomen/Pelvis: Physiologic GI/  activity. No abnormal visceral or she tracer uptake is present. \par \par Musculoskeletal : No suspicious hypermetabolic osseous lesions. \par \par Additional CT findings: \par Status post low anterior resection. Ileostomy reversal with small parastomal hernia in the right lower quadrant. \par Fat-containing right inguinal hernia. \par Left upper pole renal cyst. \par \par IMPRESSION: \par \par 7 mm left upper lobe solid nodule is FDG avid, SUV max 4.5, consistent with biologic tumor activity. \par \par Otherwise no other pathologic uptake suggestive of biologic tumor activity seen throughout the scan. \par \par ==================================================================================\par \par \par EXAM: CT CHEST IC - 08/26/2019 \par \par \par INTERPRETATION: CLINICAL INDICATION: Rectal cancer \par \par TECHNIQUE: \par CT of the thorax was performed after administration of contrast. Sagittal and coronal reformats were performed as well as MIP reconstructions. Intravenous contrast: 100 cc Optiray-320 \par \par COMPARISON: CT chest dated 1/3/2019 \par \par INTERPRETATION: \par \par AIRWAYS, LUNGS AND PLEURA: The central tracheobronchial tree is patent. \par There has been interval increase in size of a left upper lobe 7 mm solid pulmonary nodule, series 4 image 111 /series 601 image 31. There is a stable right lower lobe 6 mm ground glass nodule, series 4 image 202. There is a stable left upper lobe 2 mm nodule, series 4 image 130. There is no pleural effusion. There is no pneumothorax. \par \par MEDIASTINUM: There are no enlarged mediastinal, hilar or axillary lymph nodes. The visualized portion of the thyroid gland is unremarkable. \par \par HEART AND VESSELS: The heart is normal in size. There is no pericardial effusion. \par \par BONES AND SOFT TISSUES: There are degenerative changes of the spine. The soft tissues are unremarkable. \par \par IMPRESSION: \par \par Comparison CT chest dated 1/3/2019: \par \par Interval increase in size of a left upper lobe 7 mm solid pulmonary nodule. Stable 6 mm groundglass nodule in the right lower lobe. Follow-up is suggested. \par \par No suspicious mediastinal or hilar adenopathy. \par \par Please see separate report for concurrent evaluation of the abdomen and pelvis. \par

## 2019-09-06 NOTE — ASSESSMENT
[FreeTextEntry1] : Mr. ASIM STREET is a 64 year M who presents to our office today for a consultation for a lung nodule referred to our office by Dr. Cage.  He has a personal history of colorectal cancer s/p what seems to be an LAR with diverting pouch, since taken down.  He currently has been 2 years off any surgical, medical, or radiological treatment for colorectal cancer.  \par \par He is seeing me for consultation for an enlarging, PET positive upper lobe nodule.  This was reviewed in multidisciplinary conference and thought to be likely colorectal cancer in this non smoker.  Additionally IR noted that this would be somewhat of a difficult biopsy due to proximity to the heart.  \par \par We discussed the role of surgical resection for both diagnosis as well as treatment in a patient who has no other evidence of extrathoracic disease and is relatively far out from last extrathoracic evidence of disease.  We did also discuss the unlikely but real potential outcome that this is removed with a generous wedge resection but then returns on final pathology to be a lung primary, in which case I would return to the OR with him for formal anatomic resection.  We discussed the limitations of pathology in differentiating different types of cancer in terms of primary sites.\par \par Plan at present will be for presurgical testing, PFTs, cardiology evaluation and clearance.  After this we will proceed to the OR for a left thoracoscopy and pulmonary resection (wedge resection).  \par \par Plan: PFTs, cardiac clearance, PAST, Lt VATS wedge in the CTOR some time in October\par

## 2019-09-06 NOTE — CONSULT LETTER
[Dear  ___] : Dear  [unfilled], [Consult Letter:] : I had the pleasure of evaluating your patient, [unfilled]. [Consult Closing:] : Thank you very much for allowing me to participate in the care of this patient.  If you have any questions, please do not hesitate to contact me. [Sincerely,] : Sincerely, [FreeTextEntry1] : This is a pleasant 64 year old male who presents to our office today for a consultation for a lung nodule.  He has a personal history of colorectal cancer s/p what seems to be an LAR with diverting pouch, since taken down.  He currently has been 2 years off any surgical, medical, or radiological treatment for colorectal cancer.  \par \par He is seeing me for consultation for an enlarging, PET positive upper lobe nodule.  This was reviewed in multidisciplinary conference and thought to be likely colorectal cancer in this non smoker.  Additionally IR noted that this would be somewhat of a difficult biopsy due to proximity to the heart.  \par \par We discussed the role of surgical resection for both diagnosis as well as treatment in a patient who has no other evidence of extrathoracic disease and is relatively far out from last extrathoracic evidence of disease.  We did also discuss the unlikely but real potential outcome that this is removed with a generous wedge resection but then returns on final pathology to be a lung primary, in which case I would return to the OR with him for formal anatomic resection.  We discussed the limitations of pathology in differentiating different types of cancer in terms of primary sites.\par \par Plan at present will be for presurgical testing, PFTs, cardiology evaluation and clearance.  After this we will proceed to the OR for a left thoracoscopy and pulmonary resection (wedge resection).   [FreeTextEntry2] : Krunal Corbett [FreeTextEntry3] : Remington Benjamin M.D.\par Chief, Division of Thoracic Surgery\par Department of Cardiothoracic Surgery\par  [DrShanell  ___] : Dr. LOVING

## 2019-09-18 ENCOUNTER — OUTPATIENT (OUTPATIENT)
Dept: OUTPATIENT SERVICES | Facility: HOSPITAL | Age: 64
LOS: 1 days | Discharge: HOME | End: 2019-09-18

## 2019-09-18 DIAGNOSIS — R91.8 OTHER NONSPECIFIC ABNORMAL FINDING OF LUNG FIELD: ICD-10-CM

## 2019-09-24 ENCOUNTER — FORM ENCOUNTER (OUTPATIENT)
Age: 64
End: 2019-09-24

## 2019-09-25 ENCOUNTER — OUTPATIENT (OUTPATIENT)
Dept: OUTPATIENT SERVICES | Facility: HOSPITAL | Age: 64
LOS: 1 days | Discharge: HOME | End: 2019-09-25
Payer: MEDICARE

## 2019-09-25 VITALS
RESPIRATION RATE: 18 BRPM | HEIGHT: 69 IN | DIASTOLIC BLOOD PRESSURE: 87 MMHG | OXYGEN SATURATION: 99 % | TEMPERATURE: 98 F | WEIGHT: 149.91 LBS | SYSTOLIC BLOOD PRESSURE: 149 MMHG | HEART RATE: 62 BPM

## 2019-09-25 DIAGNOSIS — Z98.890 OTHER SPECIFIED POSTPROCEDURAL STATES: Chronic | ICD-10-CM

## 2019-09-25 DIAGNOSIS — Z01.818 ENCOUNTER FOR OTHER PREPROCEDURAL EXAMINATION: ICD-10-CM

## 2019-09-25 DIAGNOSIS — R91.1 SOLITARY PULMONARY NODULE: ICD-10-CM

## 2019-09-25 LAB
ALBUMIN SERPL ELPH-MCNC: 4.9 G/DL — SIGNIFICANT CHANGE UP (ref 3.5–5.2)
ALP SERPL-CCNC: 49 U/L — SIGNIFICANT CHANGE UP (ref 30–115)
ALT FLD-CCNC: 24 U/L — SIGNIFICANT CHANGE UP (ref 0–41)
ANION GAP SERPL CALC-SCNC: 15 MMOL/L — HIGH (ref 7–14)
APPEARANCE UR: CLEAR — SIGNIFICANT CHANGE UP
APTT BLD: 31.7 SEC — SIGNIFICANT CHANGE UP (ref 27–39.2)
AST SERPL-CCNC: 25 U/L — SIGNIFICANT CHANGE UP (ref 0–41)
BASOPHILS # BLD AUTO: 0.02 K/UL — SIGNIFICANT CHANGE UP (ref 0–0.2)
BASOPHILS NFR BLD AUTO: 0.6 % — SIGNIFICANT CHANGE UP (ref 0–1)
BILIRUB SERPL-MCNC: 0.6 MG/DL — SIGNIFICANT CHANGE UP (ref 0.2–1.2)
BILIRUB UR-MCNC: NEGATIVE — SIGNIFICANT CHANGE UP
BLD GP AB SCN SERPL QL: SIGNIFICANT CHANGE UP
BUN SERPL-MCNC: 15 MG/DL — SIGNIFICANT CHANGE UP (ref 10–20)
CALCIUM SERPL-MCNC: 9.6 MG/DL — SIGNIFICANT CHANGE UP (ref 8.5–10.1)
CHLORIDE SERPL-SCNC: 99 MMOL/L — SIGNIFICANT CHANGE UP (ref 98–110)
CO2 SERPL-SCNC: 22 MMOL/L — SIGNIFICANT CHANGE UP (ref 17–32)
COLOR SPEC: SIGNIFICANT CHANGE UP
CREAT SERPL-MCNC: 1.2 MG/DL — SIGNIFICANT CHANGE UP (ref 0.7–1.5)
DIFF PNL FLD: NEGATIVE — SIGNIFICANT CHANGE UP
EOSINOPHIL # BLD AUTO: 0.18 K/UL — SIGNIFICANT CHANGE UP (ref 0–0.7)
EOSINOPHIL NFR BLD AUTO: 5.2 % — SIGNIFICANT CHANGE UP (ref 0–8)
GLUCOSE SERPL-MCNC: 90 MG/DL — SIGNIFICANT CHANGE UP (ref 70–99)
GLUCOSE UR QL: NEGATIVE — SIGNIFICANT CHANGE UP
HCT VFR BLD CALC: 37.3 % — LOW (ref 42–52)
HGB BLD-MCNC: 12.8 G/DL — LOW (ref 14–18)
IMM GRANULOCYTES NFR BLD AUTO: 0.3 % — SIGNIFICANT CHANGE UP (ref 0.1–0.3)
INR BLD: 0.92 RATIO — SIGNIFICANT CHANGE UP (ref 0.65–1.3)
KETONES UR-MCNC: NEGATIVE — SIGNIFICANT CHANGE UP
LEUKOCYTE ESTERASE UR-ACNC: NEGATIVE — SIGNIFICANT CHANGE UP
LYMPHOCYTES # BLD AUTO: 0.66 K/UL — LOW (ref 1.2–3.4)
LYMPHOCYTES # BLD AUTO: 19.1 % — LOW (ref 20.5–51.1)
MCHC RBC-ENTMCNC: 34.3 G/DL — SIGNIFICANT CHANGE UP (ref 32–37)
MCHC RBC-ENTMCNC: 34.8 PG — HIGH (ref 27–31)
MCV RBC AUTO: 101.4 FL — HIGH (ref 80–94)
MONOCYTES # BLD AUTO: 0.5 K/UL — SIGNIFICANT CHANGE UP (ref 0.1–0.6)
MONOCYTES NFR BLD AUTO: 14.5 % — HIGH (ref 1.7–9.3)
NEUTROPHILS # BLD AUTO: 2.08 K/UL — SIGNIFICANT CHANGE UP (ref 1.4–6.5)
NEUTROPHILS NFR BLD AUTO: 60.3 % — SIGNIFICANT CHANGE UP (ref 42.2–75.2)
NITRITE UR-MCNC: NEGATIVE — SIGNIFICANT CHANGE UP
NRBC # BLD: 0 /100 WBCS — SIGNIFICANT CHANGE UP (ref 0–0)
PH UR: 6.5 — SIGNIFICANT CHANGE UP (ref 5–8)
PLATELET # BLD AUTO: 234 K/UL — SIGNIFICANT CHANGE UP (ref 130–400)
POTASSIUM SERPL-MCNC: 4.3 MMOL/L — SIGNIFICANT CHANGE UP (ref 3.5–5)
POTASSIUM SERPL-SCNC: 4.3 MMOL/L — SIGNIFICANT CHANGE UP (ref 3.5–5)
PROT SERPL-MCNC: 7.1 G/DL — SIGNIFICANT CHANGE UP (ref 6–8)
PROT UR-MCNC: SIGNIFICANT CHANGE UP
PROTHROM AB SERPL-ACNC: 10.6 SEC — SIGNIFICANT CHANGE UP (ref 9.95–12.87)
RBC # BLD: 3.68 M/UL — LOW (ref 4.7–6.1)
RBC # FLD: 11.5 % — SIGNIFICANT CHANGE UP (ref 11.5–14.5)
SODIUM SERPL-SCNC: 136 MMOL/L — SIGNIFICANT CHANGE UP (ref 135–146)
SP GR SPEC: 1.02 — SIGNIFICANT CHANGE UP (ref 1.01–1.02)
UROBILINOGEN FLD QL: SIGNIFICANT CHANGE UP
WBC # BLD: 3.45 K/UL — LOW (ref 4.8–10.8)
WBC # FLD AUTO: 3.45 K/UL — LOW (ref 4.8–10.8)

## 2019-09-25 PROCEDURE — 93010 ELECTROCARDIOGRAM REPORT: CPT

## 2019-09-25 PROCEDURE — 71046 X-RAY EXAM CHEST 2 VIEWS: CPT | Mod: 26

## 2019-09-25 NOTE — H&P PST ADULT - NSICDXPASTSURGICALHX_GEN_ALL_CORE_FT
PAST SURGICAL HISTORY:  H/O arthroscopic knee surgery     History of rectal surgery RESECTION AND RECONNECTION

## 2019-09-25 NOTE — H&P PST ADULT - REASON FOR ADMISSION
65 Y/O M SCHEDULED FOR PAST FOR LEFT VIDEO ASSISTED THORASCOPIC SURGERY WEDGE RESECTION ON 10/1/19- STATES HE HAS HISTORY OF RECTAL CANCER- HE HAD A KNOWN LUNG NODULE THAT HAS GROWN IN SIZE RECENTLY

## 2019-09-25 NOTE — H&P PST ADULT - HISTORY OF PRESENT ILLNESS
CURRENTLY  DENIES ANY CP, SOB,PALPITATIONS,COUGH OR DYSURIA  EXERCISE TOLERANCE 3 FOS WITHOUT SOB      AS PER PATIENT  this is his/her complete medical history including medications - PRESCRIPTIONS  OVER THE COUNTER MEDS

## 2019-09-25 NOTE — H&P PST ADULT - ATTENDING COMMENTS
General Thoracic Surgery Attestation    I have seen and examined the patient.  Where appropriate I have updated, edited, or corrected the resident's or PA's note with regard to findings, values, and plan.    there are no updates to the clinic H+P other than PST testing and cardiology evaluation and clearance.  plan today is for left vats lung resection (wedge) possible open.  risks and benefits reviewed with the patient and family in preop and these include infection, pain, bleeding, air leak/BPF, need for further procedures, damage to other structures, need for open procedure.  in particular, we discussed the remote risk that this is a lung primary and he would be recommended for reoperation/lobectomy if IHC supported that diagnosis.  (presented in tumor board, no option for IR biopsy preop.)    they understand and wish to proceed.

## 2019-09-30 VITALS — HEART RATE: 64 BPM | WEIGHT: 149.91 LBS | RESPIRATION RATE: 12 BRPM | HEIGHT: 68.98 IN | TEMPERATURE: 99 F

## 2019-09-30 NOTE — PATIENT PROFILE ADULT - SURGICAL SITE INCISION
Problem: Pain:  Goal: Pain level will decrease  Description  Pain level will decrease  4/3/2019 1135 by Alison Holley RN  Outcome: Met This Shift  4/3/2019 0231 by Kimberly Clemente RN  Outcome: Met This Shift  Goal: Control of acute pain  Description  Control of acute pain  4/3/2019 1135 by Alison Holley RN  Outcome: Met This Shift  4/3/2019 0231 by Kimberly Clemente RN  Outcome: Met This Shift  Goal: Control of chronic pain  Description  Control of chronic pain  4/3/2019 1135 by Alison Holley RN  Outcome: Met This Shift  4/3/2019 0231 by Kimberly Clemente RN  Outcome: Met This Shift     Problem: Cardiac:  Goal: Ability to maintain vital signs within normal range will improve  Description  Ability to maintain vital signs within normal range will improve  Outcome: Met This Shift  Goal: Cardiovascular alteration will improve  Description  Cardiovascular alteration will improve  Outcome: Met This Shift     Problem: Physical Regulation:  Goal: Complications related to the disease process, condition or treatment will be avoided or minimized  Description  Complications related to the disease process, condition or treatment will be avoided or minimized  Outcome: Met This Shift no

## 2019-10-01 ENCOUNTER — INPATIENT (INPATIENT)
Facility: HOSPITAL | Age: 64
LOS: 0 days | Discharge: HOME | End: 2019-10-02
Attending: THORACIC SURGERY (CARDIOTHORACIC VASCULAR SURGERY) | Admitting: THORACIC SURGERY (CARDIOTHORACIC VASCULAR SURGERY)
Payer: MEDICARE

## 2019-10-01 ENCOUNTER — RESULT REVIEW (OUTPATIENT)
Age: 64
End: 2019-10-01

## 2019-10-01 VITALS — HEIGHT: 69 IN | WEIGHT: 149.91 LBS

## 2019-10-01 DIAGNOSIS — Z98.890 OTHER SPECIFIED POSTPROCEDURAL STATES: Chronic | ICD-10-CM

## 2019-10-01 LAB — ABO RH CONFIRMATION: SIGNIFICANT CHANGE UP

## 2019-10-01 PROCEDURE — 71045 X-RAY EXAM CHEST 1 VIEW: CPT | Mod: 26

## 2019-10-01 PROCEDURE — 88342 IMHCHEM/IMCYTCHM 1ST ANTB: CPT | Mod: 26

## 2019-10-01 PROCEDURE — 32667 THORACOSCOPY W/W RESECT ADDL: CPT

## 2019-10-01 PROCEDURE — 32666 THORACOSCOPY W/WEDGE RESECT: CPT | Mod: LT

## 2019-10-01 PROCEDURE — 88305 TISSUE EXAM BY PATHOLOGIST: CPT | Mod: 26

## 2019-10-01 PROCEDURE — 88341 IMHCHEM/IMCYTCHM EA ADD ANTB: CPT | Mod: 26

## 2019-10-01 PROCEDURE — 88309 TISSUE EXAM BY PATHOLOGIST: CPT | Mod: 26

## 2019-10-01 RX ORDER — CHLORHEXIDINE GLUCONATE 213 G/1000ML
1 SOLUTION TOPICAL
Refills: 0 | Status: DISCONTINUED | OUTPATIENT
Start: 2019-10-01 | End: 2019-10-02

## 2019-10-01 RX ORDER — HYDROMORPHONE HYDROCHLORIDE 2 MG/ML
1 INJECTION INTRAMUSCULAR; INTRAVENOUS; SUBCUTANEOUS
Refills: 0 | Status: DISCONTINUED | OUTPATIENT
Start: 2019-10-01 | End: 2019-10-01

## 2019-10-01 RX ORDER — DOCUSATE SODIUM 100 MG
100 CAPSULE ORAL THREE TIMES A DAY
Refills: 0 | Status: DISCONTINUED | OUTPATIENT
Start: 2019-10-01 | End: 2019-10-02

## 2019-10-01 RX ORDER — ONDANSETRON 8 MG/1
4 TABLET, FILM COATED ORAL EVERY 6 HOURS
Refills: 0 | Status: DISCONTINUED | OUTPATIENT
Start: 2019-10-01 | End: 2019-10-02

## 2019-10-01 RX ORDER — MORPHINE SULFATE 50 MG/1
2 CAPSULE, EXTENDED RELEASE ORAL EVERY 4 HOURS
Refills: 0 | Status: DISCONTINUED | OUTPATIENT
Start: 2019-10-01 | End: 2019-10-02

## 2019-10-01 RX ORDER — SODIUM CHLORIDE 9 MG/ML
1000 INJECTION, SOLUTION INTRAVENOUS
Refills: 0 | Status: DISCONTINUED | OUTPATIENT
Start: 2019-10-01 | End: 2019-10-02

## 2019-10-01 RX ORDER — ACETAMINOPHEN 500 MG
650 TABLET ORAL EVERY 6 HOURS
Refills: 0 | Status: DISCONTINUED | OUTPATIENT
Start: 2019-10-01 | End: 2019-10-02

## 2019-10-01 RX ORDER — HYDROMORPHONE HYDROCHLORIDE 2 MG/ML
0.5 INJECTION INTRAMUSCULAR; INTRAVENOUS; SUBCUTANEOUS
Refills: 0 | Status: DISCONTINUED | OUTPATIENT
Start: 2019-10-01 | End: 2019-10-01

## 2019-10-01 RX ORDER — FENOFIBRATE,MICRONIZED 130 MG
145 CAPSULE ORAL DAILY
Refills: 0 | Status: DISCONTINUED | OUTPATIENT
Start: 2019-10-01 | End: 2019-10-02

## 2019-10-01 RX ORDER — ONDANSETRON 8 MG/1
4 TABLET, FILM COATED ORAL ONCE
Refills: 0 | Status: DISCONTINUED | OUTPATIENT
Start: 2019-10-01 | End: 2019-10-01

## 2019-10-01 RX ORDER — HEPARIN SODIUM 5000 [USP'U]/ML
5000 INJECTION INTRAVENOUS; SUBCUTANEOUS EVERY 12 HOURS
Refills: 0 | Status: DISCONTINUED | OUTPATIENT
Start: 2019-10-01 | End: 2019-10-02

## 2019-10-01 RX ORDER — OXYCODONE HYDROCHLORIDE 5 MG/1
5 TABLET ORAL EVERY 6 HOURS
Refills: 0 | Status: DISCONTINUED | OUTPATIENT
Start: 2019-10-01 | End: 2019-10-02

## 2019-10-01 RX ORDER — PANTOPRAZOLE SODIUM 20 MG/1
40 TABLET, DELAYED RELEASE ORAL
Refills: 0 | Status: DISCONTINUED | OUTPATIENT
Start: 2019-10-01 | End: 2019-10-02

## 2019-10-01 RX ORDER — MORPHINE SULFATE 50 MG/1
2 CAPSULE, EXTENDED RELEASE ORAL
Refills: 0 | Status: DISCONTINUED | OUTPATIENT
Start: 2019-10-01 | End: 2019-10-01

## 2019-10-01 RX ORDER — SENNA PLUS 8.6 MG/1
1 TABLET ORAL
Refills: 0 | Status: DISCONTINUED | OUTPATIENT
Start: 2019-10-01 | End: 2019-10-02

## 2019-10-01 RX ORDER — LISINOPRIL 2.5 MG/1
10 TABLET ORAL DAILY
Refills: 0 | Status: DISCONTINUED | OUTPATIENT
Start: 2019-10-01 | End: 2019-10-02

## 2019-10-01 RX ADMIN — OXYCODONE HYDROCHLORIDE 5 MILLIGRAM(S): 5 TABLET ORAL at 20:26

## 2019-10-01 RX ADMIN — SENNA PLUS 1 TABLET(S): 8.6 TABLET ORAL at 17:25

## 2019-10-01 RX ADMIN — Medication 650 MILLIGRAM(S): at 17:58

## 2019-10-01 RX ADMIN — MORPHINE SULFATE 2 MILLIGRAM(S): 50 CAPSULE, EXTENDED RELEASE ORAL at 17:25

## 2019-10-01 RX ADMIN — HYDROMORPHONE HYDROCHLORIDE 1 MILLIGRAM(S): 2 INJECTION INTRAMUSCULAR; INTRAVENOUS; SUBCUTANEOUS at 12:26

## 2019-10-01 RX ADMIN — Medication 100 MILLIGRAM(S): at 21:27

## 2019-10-01 RX ADMIN — Medication 650 MILLIGRAM(S): at 17:25

## 2019-10-01 RX ADMIN — MORPHINE SULFATE 2 MILLIGRAM(S): 50 CAPSULE, EXTENDED RELEASE ORAL at 11:01

## 2019-10-01 RX ADMIN — SODIUM CHLORIDE 60 MILLILITER(S): 9 INJECTION, SOLUTION INTRAVENOUS at 10:40

## 2019-10-01 RX ADMIN — Medication 100 MILLIGRAM(S): at 14:21

## 2019-10-01 RX ADMIN — SODIUM CHLORIDE 75 MILLILITER(S): 9 INJECTION, SOLUTION INTRAVENOUS at 11:20

## 2019-10-01 RX ADMIN — HYDROMORPHONE HYDROCHLORIDE 1 MILLIGRAM(S): 2 INJECTION INTRAMUSCULAR; INTRAVENOUS; SUBCUTANEOUS at 12:00

## 2019-10-01 RX ADMIN — MORPHINE SULFATE 2 MILLIGRAM(S): 50 CAPSULE, EXTENDED RELEASE ORAL at 11:15

## 2019-10-01 RX ADMIN — HEPARIN SODIUM 5000 UNIT(S): 5000 INJECTION INTRAVENOUS; SUBCUTANEOUS at 17:25

## 2019-10-01 RX ADMIN — OXYCODONE HYDROCHLORIDE 5 MILLIGRAM(S): 5 TABLET ORAL at 20:56

## 2019-10-01 RX ADMIN — MORPHINE SULFATE 2 MILLIGRAM(S): 50 CAPSULE, EXTENDED RELEASE ORAL at 10:56

## 2019-10-01 RX ADMIN — MORPHINE SULFATE 2 MILLIGRAM(S): 50 CAPSULE, EXTENDED RELEASE ORAL at 12:04

## 2019-10-01 RX ADMIN — MORPHINE SULFATE 2 MILLIGRAM(S): 50 CAPSULE, EXTENDED RELEASE ORAL at 17:45

## 2019-10-01 RX ADMIN — Medication 145 MILLIGRAM(S): at 14:21

## 2019-10-01 RX ADMIN — MORPHINE SULFATE 2 MILLIGRAM(S): 50 CAPSULE, EXTENDED RELEASE ORAL at 11:34

## 2019-10-01 RX ADMIN — MORPHINE SULFATE 2 MILLIGRAM(S): 50 CAPSULE, EXTENDED RELEASE ORAL at 11:27

## 2019-10-01 NOTE — CHART NOTE - NSCHARTNOTEFT_GEN_A_CORE
Post Operative Check    Patient is post op from 64y old Male s/p thoracoscopy and wedge resection with 2 left upper lobe pleural biopsy and is doing well post operatively. Patient has not yet voided but is tolerating clear liquid diet without nausea or vomiting. Patient is pulling 3500 on IS and had 45cc of sanguinous output from L chest tube which is to suction.    Vitals  T(C): 36.8 (10-01-19 @ 15:29), Max: 37.1 (10-01-19 @ 11:40)  HR: 66 (10-01-19 @ 15:29) (52 - 66)  BP: 141/80 (10-01-19 @ 15:29) (133/73 - 168/90)  RR: 20 (10-01-19 @ 15:29) (12 - 20)  SpO2: 100% (10-01-19 @ 13:55) (99% - 100%)    10-01 @ 07:01  -  10-01 @ 17:33  --------------------------------------------------------  IN:    dextrose 5% + sodium chloride 0.9%.: 150 mL    lactated ringers.: 30 mL  Total IN: 180 mL    OUT:    Chest Tube: 30 mL  Total OUT: 30 mL    Total NET: 150 mL    Physical Exam  General: NAD AAOx3   Cards: RRR S1S2  Resp: tender to palpation in L lateral chest, dressing clean, dry and intact  Abdomen:  Ext: NTBL    Radiology:     Impression:    Stable support lines and tubes  No radiographic evidence of acute cardiopulmonary disease.  < end of copied text >    Assessment:  Patient is a 64y old Male s/p thoracoscopy and wedge resection with 2 left upper lobe pleural biopsy    Plan:  -advance diet as tolerated  -encourage ambulation  -f/u TOV  -encourage IS  -f/u pleural biopsy

## 2019-10-01 NOTE — BRIEF OPERATIVE NOTE - NSICDXBRIEFPROCEDURE_GEN_ALL_CORE_FT
PROCEDURES:  Thoracoscopy with therapeutic wedge resection 01-Oct-2019 10:28:42 wedge X 2 left upper lobe, pleural biopsy Remington Benjamin

## 2019-10-02 ENCOUNTER — TRANSCRIPTION ENCOUNTER (OUTPATIENT)
Age: 64
End: 2019-10-02

## 2019-10-02 VITALS
TEMPERATURE: 98 F | RESPIRATION RATE: 18 BRPM | DIASTOLIC BLOOD PRESSURE: 68 MMHG | HEART RATE: 56 BPM | SYSTOLIC BLOOD PRESSURE: 136 MMHG

## 2019-10-02 PROBLEM — I10 ESSENTIAL (PRIMARY) HYPERTENSION: Chronic | Status: ACTIVE | Noted: 2019-09-25

## 2019-10-02 PROBLEM — E78.1 PURE HYPERGLYCERIDEMIA: Chronic | Status: ACTIVE | Noted: 2019-09-25

## 2019-10-02 LAB
NIGHT BLUE STAIN TISS: SIGNIFICANT CHANGE UP
SPECIMEN SOURCE: SIGNIFICANT CHANGE UP

## 2019-10-02 PROCEDURE — 71045 X-RAY EXAM CHEST 1 VIEW: CPT | Mod: 26

## 2019-10-02 RX ORDER — ACETAMINOPHEN 500 MG
2 TABLET ORAL
Qty: 0 | Refills: 0 | DISCHARGE
Start: 2019-10-02

## 2019-10-02 RX ORDER — DOCUSATE SODIUM 100 MG
10 CAPSULE ORAL
Qty: 0 | Refills: 0 | DISCHARGE
Start: 2019-10-02

## 2019-10-02 RX ORDER — IBUPROFEN 200 MG
0 TABLET ORAL
Qty: 0 | Refills: 0 | DISCHARGE

## 2019-10-02 RX ORDER — SENNA PLUS 8.6 MG/1
1 TABLET ORAL
Qty: 0 | Refills: 0 | DISCHARGE
Start: 2019-10-02

## 2019-10-02 RX ADMIN — Medication 650 MILLIGRAM(S): at 00:13

## 2019-10-02 RX ADMIN — Medication 100 MILLIGRAM(S): at 05:48

## 2019-10-02 RX ADMIN — HEPARIN SODIUM 5000 UNIT(S): 5000 INJECTION INTRAVENOUS; SUBCUTANEOUS at 05:46

## 2019-10-02 RX ADMIN — PANTOPRAZOLE SODIUM 40 MILLIGRAM(S): 20 TABLET, DELAYED RELEASE ORAL at 05:46

## 2019-10-02 RX ADMIN — OXYCODONE HYDROCHLORIDE 5 MILLIGRAM(S): 5 TABLET ORAL at 04:02

## 2019-10-02 RX ADMIN — LISINOPRIL 10 MILLIGRAM(S): 2.5 TABLET ORAL at 05:46

## 2019-10-02 RX ADMIN — Medication 650 MILLIGRAM(S): at 05:45

## 2019-10-02 RX ADMIN — Medication 650 MILLIGRAM(S): at 12:09

## 2019-10-02 RX ADMIN — Medication 100 MILLIGRAM(S): at 05:43

## 2019-10-02 RX ADMIN — Medication 650 MILLIGRAM(S): at 12:10

## 2019-10-02 RX ADMIN — Medication 650 MILLIGRAM(S): at 05:48

## 2019-10-02 RX ADMIN — SENNA PLUS 1 TABLET(S): 8.6 TABLET ORAL at 05:47

## 2019-10-02 RX ADMIN — Medication 145 MILLIGRAM(S): at 12:09

## 2019-10-02 RX ADMIN — OXYCODONE HYDROCHLORIDE 5 MILLIGRAM(S): 5 TABLET ORAL at 03:00

## 2019-10-02 NOTE — DISCHARGE NOTE PROVIDER - NSDCCPTREATMENT_GEN_ALL_CORE_FT
PRINCIPAL PROCEDURE  Procedure: Thoracoscopy, with wedge resection of lung  Findings and Treatment: wedge X 2 left upper lobe, pleural biopsy

## 2019-10-02 NOTE — DISCHARGE NOTE PROVIDER - NSDCFUADDINST_GEN_ALL_CORE_FT
FOLLOW UP:  1. Follow up with Dr Benjamin in 2 weeks. Call office for appointment.   2. Follow up with your Primary MD within 1 week  Keep wound clean and dry. Can remove the dressing in 2 days, can take a shower after removing the dressing. no strenuous activity or heavy lifting.  - If experience fever, chest pain, shortness of breath, dizziness, vomiting , bleeding or drainage from wound call Primary MD or return to ED  - Take Tylenol for pain.   - continue taking you regular home medications    VITALS FOLLOW UP:  1. Follow up with Dr Benjamin in 2 weeks for regular post op follow up and the results on surgical pathology and cultures. Call the office for appointment.   2. Follow up with your Primary MD within 1 week  3. You will go home with incentive spirometer, and should continue to use it until you chest does not hurt anymore and feels exactly the same as preoperatively.  (appx 4-6 weeks)  4. Ambulate as tolerated. You should be active at home, and should walk AT LEAST twice a day after discharge.  5. Weight lifting restrictions: no heavy lifting >15 lbs for 2 weeks  6. Driving: you may drive when you 1) can perform the mechanics of driving without hesitation and 2) when you are no longer on narcotic pain medication  7. Continue using Tylenol for pain. Percocet was sent to pharmacy take as needed por pain,   8. Chest tube dressings may be removed in 2 days. You may shower after this dressing is off, but no swimming, hot tubs, or baths for 4 weeks post op.  9. If experience fever, chest pain, shortness of breath, dizziness, vomiting , bleeding or drainage from wound call Primary MD or return to ED  10 continue taking you regular home medications FOLLOW UP:  1. Follow up with Dr Benjamin in 2 weeks for regular post op follow up and the results on surgical pathology and cultures. Call the office for appointment.   2. Follow up with your Primary MD within 1 week  3. You will go home with incentive spirometer, and should continue to use it until you chest does not hurt anymore and feels exactly the same as preoperatively.  (appx 4-6 weeks)  4. Ambulate as tolerated. You should be active at home, and should walk AT LEAST twice a day after discharge.  5. Weight lifting restrictions: no heavy lifting >15 lbs for 2 weeks  6. Driving: you may drive when you 1) can perform the mechanics of driving without hesitation and 2) when you are no longer on narcotic pain medication  7. Continue using Tylenol for pain. Percocet was sent to pharmacy take as needed for pain,   8. Chest tube dressings may be removed in 2 days. You may shower after this dressing is off, but no swimming, hot tubs, or baths for 4 weeks post op.  9. If experience fever, chest pain, shortness of breath, dizziness, vomiting , bleeding or drainage from wound call Primary MD or return to ED  10 continue taking you regular home medications

## 2019-10-02 NOTE — DISCHARGE NOTE NURSING/CASE MANAGEMENT/SOCIAL WORK - PATIENT PORTAL LINK FT
You can access the FollowMyHealth Patient Portal offered by [facility] by registering at the following website: [email or web address]. By joining [facility]’s FollowMyHealth portal, you will also be able to view your health information using other applications (apps) compatible with our system.

## 2019-10-02 NOTE — DISCHARGE NOTE PROVIDER - NSDCFUSCHEDAPPT_GEN_ALL_CORE_FT
[address] ; 10/21/2019 ; [facility] [address] ; 12/02/2019 ; [facility] [address] ; 10/21/2019 ; [facility] [address] ; 12/02/2019 ; [facility] [address] ; 10/21/2019 ; [facility] [address] ; 12/02/2019 ; [facility] [address]

## 2019-10-02 NOTE — DISCHARGE NOTE PROVIDER - HOSPITAL COURSE
63 y/o male with PMHx HLD, HTN, Rectal Cancer, lung nodules that have increased in size recently presented for elective surgery. He underwent Thoracoscopy with  wedge resection X 2left upper lobe and pleural biopsy. Post op he is feeling well. He ambulated. Pain is well controlled    He had a left sided Chest tube which was removed on 10/2.     follow up chest xray showed:     Per Dr Benjamin he is stable for discharge home on 10/2 with pain medications.    He will follow up outpatient with Dr Benjamin for regular post op follow up and the results on surgical pathology and cultures.

## 2019-10-02 NOTE — PROGRESS NOTE ADULT - ASSESSMENT
Chest tube to suction  Daily chest xray  Monitor chest tube out put  Monitor for airleaks  Monitor 02 saturation  Incentive spirometry  encourage ambulation  DVT/GI prophylaxis  Pain management  regular diet   possible chest tube removal in the am   discharge planning

## 2019-10-02 NOTE — DISCHARGE NOTE PROVIDER - NSDCCPCAREPLAN_GEN_ALL_CORE_FT
PRINCIPAL DISCHARGE DIAGNOSIS  Diagnosis: Lung nodule  Assessment and Plan of Treatment: s/p Thoracoscopy with  wedge resection X 2left upper lobe and pleural biopsy.   He will follow up outpatient with Dr Benjamin for regular post op follow up and the results on surgical pathology and cultures.

## 2019-10-02 NOTE — DISCHARGE NOTE PROVIDER - CARE PROVIDER_API CALL
Remington Benjamin)  Surgery; Thoracic Surgery  [address] [place] [address], [address]  [place] [address]  Phone: [phone]  Fax: [phone]  Follow Up Time:

## 2019-10-02 NOTE — PROGRESS NOTE ADULT - SUBJECTIVE AND OBJECTIVE BOX
Hospital Day: 2  Post Operative Day:1  Procedure: Left VATS with wedge resection and upper lobe biopsy    Patient is a 64y old  Male who presents for elective   PAST MEDICAL & SURGICAL HISTORY:  High triglycerides  HTN (hypertension)  Rectal cancer: 2017  H/O arthroscopic knee surgery  History of rectal surgery: RESECTION AND RECONNECTION      Events of the Last 24h:none   Vital Signs Last 24 Hrs  T(C): 36.6 (01 Oct 2019 23:00), Max: 37.1 (01 Oct 2019 11:40)  T(F): 97.9 (01 Oct 2019 23:00), Max: 98.8 (01 Oct 2019 11:40)  HR: 57 (01 Oct 2019 23:00) (52 - 66)  BP: 127/64 (01 Oct 2019 23:00) (127/64 - 168/90)  BP(mean): --  RR: 19 (01 Oct 2019 23:00) (12 - 20)  SpO2: 100% (01 Oct 2019 13:55) (99% - 100%)        Diet, Regular (10-01-19 @ 22:09)      I&O's Summary    01 Oct 2019 07:01  -  02 Oct 2019 00:38  --------------------------------------------------------  IN: 180 mL / OUT: 48 mL / NET: 132 mL     I&O's Detail    01 Oct 2019 07:01  -  02 Oct 2019 00:38  --------------------------------------------------------  IN:    dextrose 5% + sodium chloride 0.9%.: 150 mL    lactated ringers.: 30 mL  Total IN: 180 mL    OUT:    Chest Tube: 48 mL  Total OUT: 48 mL    Total NET: 132 mL          MEDICATIONS  (STANDING):  acetaminophen   Tablet .. 650 milliGRAM(s) Oral every 6 hours  chlorhexidine 2% Cloths 1 Application(s) Topical <User Schedule>  clindamycin IVPB 600 milliGRAM(s) IV Intermittent every 8 hours  dextrose 5% + sodium chloride 0.9%. 1000 milliLiter(s) (75 mL/Hr) IV Continuous <Continuous>  docusate sodium Liquid 100 milliGRAM(s) Oral three times a day  fenofibrate Tablet 145 milliGRAM(s) Oral daily  heparin  Injectable 5000 Unit(s) SubCutaneous every 12 hours  lactated ringers. 1000 milliLiter(s) (60 mL/Hr) IV Continuous <Continuous>  lisinopril 10 milliGRAM(s) Oral daily  pantoprazole    Tablet 40 milliGRAM(s) Oral before breakfast  senna 1 Tablet(s) Oral two times a day    MEDICATIONS  (PRN):  morphine  - Injectable 2 milliGRAM(s) IV Push every 4 hours PRN Severe Pain (7 - 10)  ondansetron Injectable 4 milliGRAM(s) IV Push every 6 hours PRN Nausea  oxyCODONE    IR 5 milliGRAM(s) Oral every 6 hours PRN Moderate Pain (4 - 6)      PHYSICAL EXAM:    GENERAL: NAD    HEENT: NCAT    CHEST/LUNGS: CTAB, left chest tube to suction no airleaks    HEART: RRR,  No murmurs, rubs, or gallops    ABDOMEN: SNTND +BS    EXTREMITIES:  FROM, No clubbing, cyanosis, or edema, palpable pulse    NEURO: No focal neurological deficits    SKIN: No rashes or lesions    INCISION/WOUNDS:                      136   |  99    |  15                 Ca: 9.6    BMP:   ----------------------------< 90     Mg: x     (09-25-19 @ 09:49)             4.3    |  22    | 1.2                Ph: x        LFT:     TPro: 7.1 / Alb: 4.9 / TBili: 0.6 / DBili: x / AST: 25 / ALT: 24 / AlkPhos: 49   (09-25-19 @ 09:49)              < from: Xray Chest 1 View AP/PA (10.01.19 @ 10:36) >    Support devices: Left chest tube. Partially imaged ET tube above kiera    Cardiac/mediastinum/hilum: Unremarkable.    Lung parenchyma/Pleura: No pneumothorax. Within normal limits.    Skeleton/soft tissues: Unremarkable.    Impression:    Stable support lines and tubes  No radiographic evidence of acute cardiopulmonary disease.            < end of copied text >

## 2019-10-03 LAB — SURGICAL PATHOLOGY STUDY: SIGNIFICANT CHANGE UP

## 2019-10-06 LAB
CULTURE RESULTS: SIGNIFICANT CHANGE UP
SPECIMEN SOURCE: SIGNIFICANT CHANGE UP

## 2019-10-10 DIAGNOSIS — I10 ESSENTIAL (PRIMARY) HYPERTENSION: ICD-10-CM

## 2019-10-10 DIAGNOSIS — R91.1 SOLITARY PULMONARY NODULE: ICD-10-CM

## 2019-10-10 DIAGNOSIS — E78.5 HYPERLIPIDEMIA, UNSPECIFIED: ICD-10-CM

## 2019-10-15 ENCOUNTER — FORM ENCOUNTER (OUTPATIENT)
Age: 64
End: 2019-10-15

## 2019-10-16 ENCOUNTER — APPOINTMENT (OUTPATIENT)
Dept: CARDIOTHORACIC SURGERY | Facility: CLINIC | Age: 64
End: 2019-10-16
Payer: MEDICARE

## 2019-10-16 ENCOUNTER — OUTPATIENT (OUTPATIENT)
Dept: OUTPATIENT SERVICES | Facility: HOSPITAL | Age: 64
LOS: 1 days | Discharge: HOME | End: 2019-10-16
Payer: MEDICARE

## 2019-10-16 VITALS
DIASTOLIC BLOOD PRESSURE: 74 MMHG | RESPIRATION RATE: 12 BRPM | WEIGHT: 153 LBS | HEART RATE: 68 BPM | BODY MASS INDEX: 22.66 KG/M2 | SYSTOLIC BLOOD PRESSURE: 115 MMHG | OXYGEN SATURATION: 97 % | TEMPERATURE: 98.5 F | HEIGHT: 69 IN

## 2019-10-16 DIAGNOSIS — Z98.890 OTHER SPECIFIED POSTPROCEDURAL STATES: Chronic | ICD-10-CM

## 2019-10-16 DIAGNOSIS — A09 INFECTIOUS GASTROENTERITIS AND COLITIS, UNSPECIFIED: ICD-10-CM

## 2019-10-16 PROCEDURE — 71046 X-RAY EXAM CHEST 2 VIEWS: CPT | Mod: 26

## 2019-10-16 PROCEDURE — 99024 POSTOP FOLLOW-UP VISIT: CPT

## 2019-10-21 ENCOUNTER — APPOINTMENT (OUTPATIENT)
Dept: HEMATOLOGY ONCOLOGY | Facility: CLINIC | Age: 64
End: 2019-10-21
Payer: MEDICARE

## 2019-10-21 VITALS
HEIGHT: 69 IN | SYSTOLIC BLOOD PRESSURE: 121 MMHG | HEART RATE: 70 BPM | TEMPERATURE: 97.8 F | BODY MASS INDEX: 23.11 KG/M2 | DIASTOLIC BLOOD PRESSURE: 77 MMHG | RESPIRATION RATE: 12 BRPM | WEIGHT: 156 LBS

## 2019-10-21 DIAGNOSIS — Z09 ENCOUNTER FOR FOLLOW-UP EXAMINATION AFTER COMPLETED TREATMENT FOR CONDITIONS OTHER THAN MALIGNANT NEOPLASM: ICD-10-CM

## 2019-10-21 PROCEDURE — 99213 OFFICE O/P EST LOW 20 MIN: CPT

## 2019-10-21 NOTE — HISTORY OF PRESENT ILLNESS
[Disease: _____________________] : Disease: [unfilled] [N: ___] : N[unfilled] [T: ___] : T[unfilled] [M: ___] : M[unfilled] [AJCC Stage: ____] : AJCC Stage: [unfilled] [de-identified] : Patient is a 60 year old male who was diagnosed with rectal carcinoma after symptoms of hematochezia, tenesmus. He then underwent colonoscopy with Dr. Dory Carrillo on 04/22/2016, that showed an obstructing circumferential mass at 10 cm from anal verge consistent with adenocarcinoma. Patient was given chemoradiation with xeloda and he finsihed it then underwent laparoscopic LAR with TME and diverting loop ileostomy on 8/30/2016. Patient recovered from surgery except for hospitalization with dehydration from diarrhea postoperatively that resolved. He was started on adjuvant Xelox on 10/4/2016 and completed 4 cycles in December of 2016. [de-identified] : adenocarcinoma [de-identified] :   adenocarcinoma, low grade, well differentiated.  Tumor invades through the muscularis propria into subserosal adipose tissue   \par  [de-identified] : 5/9/17\par He presents to establish care. He is to have his ostomy reversed but was noted to have low WBC on blood work. See bellprincess. Due to his cytopenia  he had a bone marrow biopsy in 2/2017 that showed hypocellular marrrow. Cytogenetics and FISH was negative.\par   He had a CT abd/pelvis on 3/2017 that showed Post rectal anastomosis and right lower quadrant ileostomy without CT evidence of abdominopelvic residual disease.Unchanged 6 mm ground-glass right lower lobe pulmonary nodule. \par \par He had a colonoscopy on April 10th by Dr. Handley and reports it was normal.  He was to have his ostomy reversed by Timmy Arriaga but he was not cleared by his  PMD Dr. Earline Acosta due to low WBC. Repeat CBC showed improvement.\par \par 7/17/17\par He is doing well. He has mild grade 1 neuropathy in feet that he barely feels. His bowel movements are improving.  No weight loss, no bleeding. Ostomy was reversed. .\par \par 8/2/17\par I brought him back for follow up do tof all in his HgB. He states he feels well except for chronic neuropathy. Stools are brown and urine is yellow.\par \par 10/11/17\par He is here for follow. He feels well.  He was started on Oral b12 tabs since his B12 level was low. He has no complaints.\par \par 1/10/18\par He is here for follow up.  He has a CT C/A/P on 10/20/17 that showed stable pulmonary nodules and No evidence of metastatic disease within the abdomen or pelvis.  CEA was 2. He feels well. Was started back on his ACEi for HTN. Normal bowel movements. No bleeding. He stopped his b12 \par \par 7/25/18\par He is here for follow up. He had an US liver on 5/2018 that showed fatty liver. Colonosocpy on 5/2018 by Dr. Handley  shwoed only  mild radiation proctitis next colonosocy in 3 years May 2021. Blood work quest in May 2018  CMP normal, Cholesterol was a bit high. WBC 3.6, HgB 13.9, , Lymphs low 842, Plts 199\par He states he feels fine. HE admits to drinking 3 glasses of wine every night. \par \par 10/17/18\par He is here for follow up. Blodo work from quest 8/8/18 BNP normal,  LFT normal,  Wbc 3.5 normal diff, hgb 13.3, PSA 1.5 .7\par \par 12/3/18\par Patient is here for a follow-up visit.  He is feeling well with no complaints.  Patient denies fever, chills, nausea, vomiting. CEA is stable.\par CT C/A/P 10/30/18 shows New solid left upper lobe pulmonary nodule measuring 5 mm. He has no complaints  \par \par 2/13/19\par Patient is here for a follow-up visit.  He is feeling well with no complaints.  Patient denies fever, chills, nausea, vomiting.  Most recent CEA from 12/6/18 is stable at 1.9ng/mL.  He has restarted Vitamin B12.  \par CT Chest 1/3/19 IMPRESSION: 1. 5 mm left upper lobe solid pulmonary nodule (4/116), stable since 10/30/2018 increased in size since 10/20/2017. Continued follow-up is recommended. 2. Additional bilateral subcentimeter nodules are stable since 5/11/2016. 3. No new suspicious mass or nodule. \par Labs 1/15/19 Normal CMP, TSH 9.68, Ferritin 393, WBC 3.4, Hgb 13.5, , , Folate 8\par \par 6/12/19\par He is here for follow-up of Rectal cancer.   He is doing well. he has no complaints. Still drinks 2-3 glasses of wine every night.\par \par 10/21/19\par He is here for follow up.  Since last visit he had CT C/A/ P on 8/26/19. It showed growth in his STELLA pulmonary nodules. He than had a PET/CT on   9/4/19 7 mm left upper lobe solid nodule is FDG avid, SUV max 4.5, consistent with biologic tumor activity.\par On October 1 st he underwent wedge resection by Dr. Benjamin  path was consistent with Metastatic adenocarcinoma with colorectal primary    NGS no mutations, MMR intact, \par \par He feels well. No complaints. \par

## 2019-10-21 NOTE — PHYSICAL EXAM
[Fully active, able to carry on all pre-disease performance without restriction] : Status 0 - Fully active, able to carry on all pre-disease performance without restriction [Normal] : affect appropriate [Ulcers] : no ulcers [Mucositis] : no mucositis [Thrush] : no thrush [Vesicles] : no vesicles [de-identified] : Well healed scars from recent wedge.  [de-identified] : surgical scars, small hernia at incision site

## 2019-10-21 NOTE — RESULTS/DATA
[FreeTextEntry1] : [facility],   \par [address]  [place] [address]        [phone] \par \par Patient  Name:  [address]  :  1955 \par Patient  ID:  [id number] \par Account:  [id number] \par Patient  Location:  [facility] \par \par Accession:  [id number] \par Procedure:  CT  ABDOMEN  PELVIS  W  IV  AND  PO  CONTRAST  [id number] \par Date  of  Exam:  2017  01:52  PM \par Attending  Physician:  ALEX CONSTANTINO \par Requesting  Physician:  ALEX CONSTANTINO MD \par \par \par CLINICAL  STATEMENT:  Rectal  cancer  post  chemotherapy,  radiation  therapy  and  surgery.    \par   \par TECHNIQUE:  Contiguous  axial  CT  images  were  obtained  from  the  lung  bases  to  the  pubic  symphysis  following  administration  of  100cc  Optiray  320  intravenous  contrast.    Oral  contrast  was  administered.    Reformatted  images  in  the  coronal  and  sagittal  planes  were  acquired. \par   \par COMPARISON:    CT  abdomen  pelvis  dated  2016  and  MRI  of  pelvis  dated  2016 \par   \par FINDINGS: \par   \par LUNG  BASES:  There  is  an  unchanged  6  mm  groundglass  right  lower  lobe  pulmonary  nodule  (series  4,  image  42). \par LIVER:  Hepatic  steatosis. \par SPLEEN:  Unremarkable. \par PANCREAS:  Unremarkable. \par GALLBLADDER  AND  BILIARY  TREE:  There  is  cholelithiasis.  No  biliary  ductal  dilatation. \par ADRENALS:  Unremarkable. \par KIDNEYS:  Symmetric  enhancement  without  hydronephrosis. \par LYMPH  NODES:  There  are  no  enlarged  abdominal  or  pelvic  lymph  nodes. \par VASCULATURE:  The  abdominal  aorta  is  normal  in  caliber  and  demonstrates  atherosclerotic  changes. \par BOWEL:  Patient  is  post  rectal  anastomosis.  There  is  a  right  lower  quadrant  loop  ileostomy.  No  bowel  obstruction. \par PERITONEUM/RETROPERITONEUM/MESENTERY:  Presacral  edema.  No  ascites  or  pneumoperitoneum. \par PELVIC  VISCERA:  Unremarkable. \par BONES  AND  SOFT  TISSUES:  No  acute  osseous  abnormality  is  noted.  L4-L5  spondylolysis.  Grade  1  anterolisthesis  of  L5  on  S1.  \par   \par IMPRESSION: \par   \par Post  rectal  anastomosis  and  right  lower  quadrant  ileostomy  without  CT  evidence  of  abdominopelvic  residual  disease. \par   \par Unchanged  6  mm  groundglass  right  lower  lobe  pulmonary  nodule. \par   \par \par \par \par Original  final  report  dictated  and  signed  by  Dr. Eric Cristobal  on  2017  03:50  PM

## 2019-10-21 NOTE — CONSULT LETTER
[Consult Letter:] : I had the pleasure of evaluating your patient, [unfilled]. [Dear  ___] : Dear  [unfilled], [Sincerely,] : Sincerely, [Please see my note below.] : Please see my note below. [DrShanell  ___] : Dr. LOVING [FreeTextEntry3] : Shadi Corbett [DrShanell ___] : Dr. LOVING

## 2019-10-21 NOTE — REVIEW OF SYSTEMS
[Negative] : Neurological [Fever] : no fever [Recent Change In Weight] : ~T no recent weight change [Fatigue] : no fatigue [Chest Pain] : no chest pain [Shortness Of Breath] : no shortness of breath [Abdominal Pain] : no abdominal pain [Vomiting] : no vomiting [Diarrhea] : no diarrhea [Dysuria] : no dysuria [Joint Pain] : no joint pain [Skin Rash] : no skin rash [FreeTextEntry7] : He has a hernia

## 2019-10-21 NOTE — ASSESSMENT
[Curative] : Goals of care discussed with patient: Curative [FreeTextEntry1] : 1 Stage III rectal cancer status post chemoradiation and LAR/ileostomy and 4 cycles of adjuvant chemotherapy with XELOX. His STELLA nodule kept growing and underwent wedge resection On  October 1st 2019 which was consistent with oligometastatic disease. No chemotherapy is warranted at this time.\par - Has CT chest ordered for  1/2019 by Dr. Benjamin, will add on CT abd/pelvis as well\par -CBC, CMP CEA today \par - next colonoscopy may 2021\par -RTC post scans in January \par \par 2. Decrease white blood cell count as well as macrocytosis and mild  anemia   is stable \par - he had a bone marrow that did not demonstrate any pathology possibly has ICUS or due to ETOH use \par - I suspect this is due this Alcohol use since he claims 3 glasses of wine daily  as and increase MCV may bee seen with Fatty liver as well which he has\par - I recommended he cuts down to one glass of wine a day\par \par RTC in January 2019 if labs are fine

## 2019-10-30 ENCOUNTER — APPOINTMENT (OUTPATIENT)
Dept: HEMATOLOGY ONCOLOGY | Facility: CLINIC | Age: 64
End: 2019-10-30

## 2019-10-30 ENCOUNTER — LABORATORY RESULT (OUTPATIENT)
Age: 64
End: 2019-10-30

## 2019-10-30 DIAGNOSIS — Z00.00 ENCOUNTER FOR GENERAL ADULT MEDICAL EXAMINATION W/OUT ABNORMAL FINDINGS: ICD-10-CM

## 2019-10-30 LAB
CULTURE RESULTS: SIGNIFICANT CHANGE UP
SPECIMEN SOURCE: SIGNIFICANT CHANGE UP

## 2019-10-31 LAB
ALBUMIN SERPL ELPH-MCNC: 4.8 G/DL
ALP BLD-CCNC: 57 U/L
ALT SERPL-CCNC: 17 U/L
ANION GAP SERPL CALC-SCNC: 13 MMOL/L
AST SERPL-CCNC: 21 U/L
BILIRUB SERPL-MCNC: 0.4 MG/DL
BUN SERPL-MCNC: 16 MG/DL
CALCIUM SERPL-MCNC: 9.4 MG/DL
CEA SERPL-MCNC: 1.4 NG/ML
CHLORIDE SERPL-SCNC: 102 MMOL/L
CO2 SERPL-SCNC: 26 MMOL/L
CREAT SERPL-MCNC: 1.1 MG/DL
GLUCOSE SERPL-MCNC: 89 MG/DL
HCT VFR BLD CALC: 39.3 %
HGB BLD-MCNC: 13.2 G/DL
MCHC RBC-ENTMCNC: 33.6 G/DL
MCHC RBC-ENTMCNC: 34.3 PG
MCV RBC AUTO: 102.1 FL
PLATELET # BLD AUTO: 197 K/UL
PMV BLD: 9.8 FL
POTASSIUM SERPL-SCNC: 4.3 MMOL/L
PROT SERPL-MCNC: 7.1 G/DL
RBC # BLD: 3.85 M/UL
RBC # FLD: 12 %
SODIUM SERPL-SCNC: 141 MMOL/L
WBC # FLD AUTO: 3.3 K/UL

## 2019-11-20 LAB
CULTURE RESULTS: SIGNIFICANT CHANGE UP
SPECIMEN SOURCE: SIGNIFICANT CHANGE UP

## 2019-12-02 ENCOUNTER — APPOINTMENT (OUTPATIENT)
Dept: HEMATOLOGY ONCOLOGY | Facility: CLINIC | Age: 64
End: 2019-12-02
Payer: MEDICARE

## 2019-12-02 ENCOUNTER — OUTPATIENT (OUTPATIENT)
Dept: OUTPATIENT SERVICES | Facility: HOSPITAL | Age: 64
LOS: 1 days | Discharge: HOME | End: 2019-12-02

## 2019-12-02 VITALS
DIASTOLIC BLOOD PRESSURE: 77 MMHG | RESPIRATION RATE: 17 BRPM | OXYGEN SATURATION: 99 % | WEIGHT: 154 LBS | BODY MASS INDEX: 22.81 KG/M2 | TEMPERATURE: 97 F | SYSTOLIC BLOOD PRESSURE: 121 MMHG | HEART RATE: 85 BPM | HEIGHT: 69 IN

## 2019-12-02 DIAGNOSIS — Z98.890 OTHER SPECIFIED POSTPROCEDURAL STATES: Chronic | ICD-10-CM

## 2019-12-02 DIAGNOSIS — C20 MALIGNANT NEOPLASM OF RECTUM: ICD-10-CM

## 2019-12-02 DIAGNOSIS — C78.00 SECONDARY MALIGNANT NEOPLASM OF UNSPECIFIED LUNG: ICD-10-CM

## 2019-12-02 PROCEDURE — 99213 OFFICE O/P EST LOW 20 MIN: CPT

## 2019-12-05 NOTE — PHYSICAL EXAM
[Fully active, able to carry on all pre-disease performance without restriction] : Status 0 - Fully active, able to carry on all pre-disease performance without restriction [Normal] : affect appropriate [Ulcers] : no ulcers [Mucositis] : no mucositis [Thrush] : no thrush [Vesicles] : no vesicles [de-identified] : Well healed scars from recent wedge.  [de-identified] : surgical scars, small hernia at incision site

## 2019-12-05 NOTE — RESULTS/DATA
[FreeTextEntry1] : [facility]  [facility],   \[address]  [place] [address]        [phone] \par \par Patient  Name:  [address]  :  1955 \par Patient  ID:  [id number] \par Account:  [id number] \par Patient  Location:  [facility] \par \par Accession:  [id number] \par Procedure:  CT  ABDOMEN  PELVIS  W  IV  AND  PO  CONTRAST  [id number] \par Date  of  Exam:  2017  01:52  PM \par Attending  Physician:  ALEX CONSTANTINO \par Requesting  Physician:  ALEX CONSTANTINO MD \par \par \par CLINICAL  STATEMENT:  Rectal  cancer  post  chemotherapy,  radiation  therapy  and  surgery.    \par   \par TECHNIQUE:  Contiguous  axial  CT  images  were  obtained  from  the  lung  bases  to  the  pubic  symphysis  following  administration  of  100cc  Optiray  320  intravenous  contrast.    Oral  contrast  was  administered.    Reformatted  images  in  the  coronal  and  sagittal  planes  were  acquired. \par   \par COMPARISON:    CT  abdomen  pelvis  dated  2016  and  MRI  of  pelvis  dated  2016 \par   \par FINDINGS: \par   \par LUNG  BASES:  There  is  an  unchanged  6  mm  groundglass  right  lower  lobe  pulmonary  nodule  (series  4,  image  42). \par LIVER:  Hepatic  steatosis. \par SPLEEN:  Unremarkable. \par PANCREAS:  Unremarkable. \par GALLBLADDER  AND  BILIARY  TREE:  There  is  cholelithiasis.  No  biliary  ductal  dilatation. \par ADRENALS:  Unremarkable. \par KIDNEYS:  Symmetric  enhancement  without  hydronephrosis. \par LYMPH  NODES:  There  are  no  enlarged  abdominal  or  pelvic  lymph  nodes. \par VASCULATURE:  The  abdominal  aorta  is  normal  in  caliber  and  demonstrates  atherosclerotic  changes. \par BOWEL:  Patient  is  post  rectal  anastomosis.  There  is  a  right  lower  quadrant  loop  ileostomy.  No  bowel  obstruction. \par PERITONEUM/RETROPERITONEUM/MESENTERY:  Presacral  edema.  No  ascites  or  pneumoperitoneum. \par PELVIC  VISCERA:  Unremarkable. \par BONES  AND  SOFT  TISSUES:  No  acute  osseous  abnormality  is  noted.  L4-L5  spondylolysis.  Grade  1  anterolisthesis  of  L5  on  S1.  \par   \par IMPRESSION: \par   \par Post  rectal  anastomosis  and  right  lower  quadrant  ileostomy  without  CT  evidence  of  abdominopelvic  residual  disease. \par   \par Unchanged  6  mm  groundglass  right  lower  lobe  pulmonary  nodule. \par   \par \par \par \par Original  final  report  dictated  and  signed  by  Dr. Eric Cristobal  on  2017  03:50  PM

## 2019-12-05 NOTE — HISTORY OF PRESENT ILLNESS
[T: ___] : T[unfilled] [Disease: _____________________] : Disease: [unfilled] [N: ___] : N[unfilled] [AJCC Stage: ____] : AJCC Stage: [unfilled] [M: ___] : M[unfilled] [de-identified] : Patient is a 60 year old male who was diagnosed with rectal carcinoma after symptoms of hematochezia, tenesmus. He then underwent colonoscopy with Dr. Dory Carrillo on 04/22/2016, that showed an obstructing circumferential mass at 10 cm from anal verge consistent with adenocarcinoma. Patient was given chemoradiation with xeloda and he finsihed it then underwent laparoscopic LAR with TME and diverting loop ileostomy on 8/30/2016. Patient recovered from surgery except for hospitalization with dehydration from diarrhea postoperatively that resolved. He was started on adjuvant Xelox on 10/4/2016 and completed 4 cycles in December of 2016. [de-identified] : adenocarcinoma [de-identified] :   adenocarcinoma, low grade, well differentiated.  Tumor invades through the muscularis propria into subserosal adipose tissue   \par  [de-identified] : 5/9/17\par He presents to establish care. He is to have his ostomy reversed but was noted to have low WBC on blood work. See bellprincess. Due to his cytopenia  he had a bone marrow biopsy in 2/2017 that showed hypocellular marrrow. Cytogenetics and FISH was negative.\par   He had a CT abd/pelvis on 3/2017 that showed Post rectal anastomosis and right lower quadrant ileostomy without CT evidence of abdominopelvic residual disease.Unchanged 6 mm ground-glass right lower lobe pulmonary nodule. \par \par He had a colonoscopy on April 10th by Dr. Handley and reports it was normal.  He was to have his ostomy reversed by Timmy Arriaga but he was not cleared by his  PMD Dr. Earline Acosta due to low WBC. Repeat CBC showed improvement.\par \par 7/17/17\par He is doing well. He has mild grade 1 neuropathy in feet that he barely feels. His bowel movements are improving.  No weight loss, no bleeding. Ostomy was reversed. .\par \par 8/2/17\par I brought him back for follow up do tof all in his HgB. He states he feels well except for chronic neuropathy. Stools are brown and urine is yellow.\par \par 10/11/17\par He is here for follow. He feels well.  He was started on Oral b12 tabs since his B12 level was low. He has no complaints.\par \par 1/10/18\par He is here for follow up.  He has a CT C/A/P on 10/20/17 that showed stable pulmonary nodules and No evidence of metastatic disease within the abdomen or pelvis.  CEA was 2. He feels well. Was started back on his ACEi for HTN. Normal bowel movements. No bleeding. He stopped his b12 \par \par 7/25/18\par He is here for follow up. He had an US liver on 5/2018 that showed fatty liver. Colonosocpy on 5/2018 by Dr. Handley  shwoed only  mild radiation proctitis next colonosocy in 3 years May 2021. Blood work quest in May 2018  CMP normal, Cholesterol was a bit high. WBC 3.6, HgB 13.9, , Lymphs low 842, Plts 199\par He states he feels fine. HE admits to drinking 3 glasses of wine every night. \par \par 10/17/18\par He is here for follow up. Blodo work from quest 8/8/18 BNP normal,  LFT normal,  Wbc 3.5 normal diff, hgb 13.3, PSA 1.5 .7\par \par 12/3/18\par Patient is here for a follow-up visit.  He is feeling well with no complaints.  Patient denies fever, chills, nausea, vomiting. CEA is stable.\par CT C/A/P 10/30/18 shows New solid left upper lobe pulmonary nodule measuring 5 mm. He has no complaints  \par \par 2/13/19\par Patient is here for a follow-up visit.  He is feeling well with no complaints.  Patient denies fever, chills, nausea, vomiting.  Most recent CEA from 12/6/18 is stable at 1.9ng/mL.  He has restarted Vitamin B12.  \par CT Chest 1/3/19 IMPRESSION: 1. 5 mm left upper lobe solid pulmonary nodule (4/116), stable since 10/30/2018 increased in size since 10/20/2017. Continued follow-up is recommended. 2. Additional bilateral subcentimeter nodules are stable since 5/11/2016. 3. No new suspicious mass or nodule. \par Labs 1/15/19 Normal CMP, TSH 9.68, Ferritin 393, WBC 3.4, Hgb 13.5, , , Folate 8\par \par 6/12/19\par He is here for follow-up of Rectal cancer.   He is doing well. he has no complaints. Still drinks 2-3 glasses of wine every night.\par \par 10/21/19\par He is here for follow up.  Since last visit he had CT C/A/ P on 8/26/19. It showed growth in his STELLA pulmonary nodules. He than had a PET/CT on   9/4/19 7 mm left upper lobe solid nodule is FDG avid, SUV max 4.5, consistent with biologic tumor activity.\par On October 1 st he underwent wedge resection by Dr. Benjamin  path was consistent with Metastatic adenocarcinoma with colorectal primary    NGS no mutations, MMR intact, \par \par He feels well. No complaints. \par \par 12/2/19\par Patient is here for a follow-up visit.  He is feeling well with no new complaints.  Patient denies fever, chills, nausea, vomiting, new pain or bleeding.  He has plans for blood work in early January and will go for repeat scans afterward.  This appointment was scheduled months in advance, due to timing of treatment, he was instructed to come in January; however this visit was never canceled.

## 2019-12-05 NOTE — ASSESSMENT
[Curative] : Goals of care discussed with patient: Curative [FreeTextEntry1] : 1 Stage III rectal cancer status post chemoradiation and LAR/ileostomy and 4 cycles of adjuvant chemotherapy with XELOX. His STELLA nodule kept growing and underwent wedge resection On  October 1st 2019 which was consistent with oligometastatic disease. No chemotherapy is warranted at this time.\par - Has CT chest ordered for  1/2020by Dr. Benjamin, will add on CT abd/pelvis as well\par - CBC, CMP CEA today \par - next colonoscopy may 2021\par \par 2. Decrease white blood cell count as well as macrocytosis and mild  anemia   is stable \par - he had a bone marrow that did not demonstrate any pathology possibly has ICUS or due to ETOH use \par - I suspect this is due this Alcohol use since he claims 3 glasses of wine daily  as and increase MCV may bee seen with Fatty liver as well which he has\par - I recommended only  one glass of wine a day\par \par RTC in January 2019, post scans

## 2020-01-03 ENCOUNTER — LABORATORY RESULT (OUTPATIENT)
Age: 65
End: 2020-01-03

## 2020-01-08 ENCOUNTER — APPOINTMENT (OUTPATIENT)
Dept: HEMATOLOGY ONCOLOGY | Facility: CLINIC | Age: 65
End: 2020-01-08

## 2020-01-22 ENCOUNTER — FORM ENCOUNTER (OUTPATIENT)
Age: 65
End: 2020-01-22

## 2020-01-23 ENCOUNTER — OUTPATIENT (OUTPATIENT)
Dept: OUTPATIENT SERVICES | Facility: HOSPITAL | Age: 65
LOS: 1 days | Discharge: HOME | End: 2020-01-23
Payer: MEDICARE

## 2020-01-23 DIAGNOSIS — C20 MALIGNANT NEOPLASM OF RECTUM: ICD-10-CM

## 2020-01-23 DIAGNOSIS — Z98.890 OTHER SPECIFIED POSTPROCEDURAL STATES: Chronic | ICD-10-CM

## 2020-01-23 DIAGNOSIS — C78.00 SECONDARY MALIGNANT NEOPLASM OF UNSPECIFIED LUNG: ICD-10-CM

## 2020-01-23 PROCEDURE — 74177 CT ABD & PELVIS W/CONTRAST: CPT | Mod: 26

## 2020-01-23 PROCEDURE — 71260 CT THORAX DX C+: CPT | Mod: 26

## 2020-03-03 ENCOUNTER — LABORATORY RESULT (OUTPATIENT)
Age: 65
End: 2020-03-03

## 2020-03-03 ENCOUNTER — APPOINTMENT (OUTPATIENT)
Dept: HEMATOLOGY ONCOLOGY | Facility: CLINIC | Age: 65
End: 2020-03-03
Payer: MEDICARE

## 2020-03-03 VITALS
SYSTOLIC BLOOD PRESSURE: 130 MMHG | BODY MASS INDEX: 23.11 KG/M2 | DIASTOLIC BLOOD PRESSURE: 67 MMHG | WEIGHT: 156 LBS | HEIGHT: 69 IN | HEART RATE: 67 BPM | RESPIRATION RATE: 17 BRPM | TEMPERATURE: 97.8 F

## 2020-03-03 DIAGNOSIS — R91.1 SOLITARY PULMONARY NODULE: ICD-10-CM

## 2020-03-03 PROCEDURE — 99213 OFFICE O/P EST LOW 20 MIN: CPT

## 2020-03-04 LAB
ALBUMIN SERPL ELPH-MCNC: 4.8 G/DL
ALP BLD-CCNC: 54 U/L
ALT SERPL-CCNC: 23 U/L
ANION GAP SERPL CALC-SCNC: 12 MMOL/L
AST SERPL-CCNC: 28 U/L
BILIRUB SERPL-MCNC: 0.4 MG/DL
BUN SERPL-MCNC: 21 MG/DL
CALCIUM SERPL-MCNC: 9.7 MG/DL
CEA SERPL-MCNC: 1.5 NG/ML
CHLORIDE SERPL-SCNC: 98 MMOL/L
CO2 SERPL-SCNC: 26 MMOL/L
CREAT SERPL-MCNC: 1.3 MG/DL
GLUCOSE SERPL-MCNC: 117 MG/DL
HCT VFR BLD CALC: 38.7 %
HGB BLD-MCNC: 13.2 G/DL
MCHC RBC-ENTMCNC: 34 PG
MCHC RBC-ENTMCNC: 34.1 G/DL
MCV RBC AUTO: 99.7 FL
PLATELET # BLD AUTO: 206 K/UL
PMV BLD: 9.5 FL
POTASSIUM SERPL-SCNC: 4.2 MMOL/L
PROT SERPL-MCNC: 7.1 G/DL
RBC # BLD: 3.88 M/UL
RBC # FLD: 11.9 %
SODIUM SERPL-SCNC: 136 MMOL/L
WBC # FLD AUTO: 4.34 K/UL

## 2020-03-05 PROBLEM — R91.1 LUNG NODULE: Status: ACTIVE | Noted: 2019-09-04

## 2020-03-05 RX ORDER — OXYCODONE AND ACETAMINOPHEN 5; 325 MG/1; MG/1
5-325 TABLET ORAL
Qty: 30 | Refills: 0 | Status: ACTIVE | COMMUNITY
Start: 2019-10-02

## 2020-03-05 NOTE — RESULTS/DATA
[FreeTextEntry1] : [facility],   \par [address]  [place] [address]        [phone] \par \par Patient  Name:  ASIM STREET  :  1955 \par Patient  ID:  [id number] \par Account:  [id number] \par Patient  Location:  [facility] \par \par Accession:  [id number] \par Procedure:  CT  ABDOMEN  PELVIS  W  IV  AND  PO  CONTRAST  [id number] \par Date  of  Exam:  2017  01:52  PM \par Attending  Physician:  ALEX CONSTANTINO \par Requesting  Physician:  ALEX CONSTANTINO MD \par \par \par CLINICAL  STATEMENT:  Rectal  cancer  post  chemotherapy,  radiation  therapy  and  surgery.    \par   \par TECHNIQUE:  Contiguous  axial  CT  images  were  obtained  from  the  lung  bases  to  the  pubic  symphysis  following  administration  of  100cc  Optiray  320  intravenous  contrast.    Oral  contrast  was  administered.    Reformatted  images  in  the  coronal  and  sagittal  planes  were  acquired. \par   \par COMPARISON:    CT  abdomen  pelvis  dated  2016  and  MRI  of  pelvis  dated  2016 \par   \par FINDINGS: \par   \par LUNG  BASES:  There  is  an  unchanged  6  mm  groundglass  right  lower  lobe  pulmonary  nodule  (series  4,  image  42). \par LIVER:  Hepatic  steatosis. \par SPLEEN:  Unremarkable. \par PANCREAS:  Unremarkable. \par GALLBLADDER  AND  BILIARY  TREE:  There  is  cholelithiasis.  No  biliary  ductal  dilatation. \par ADRENALS:  Unremarkable. \par KIDNEYS:  Symmetric  enhancement  without  hydronephrosis. \par LYMPH  NODES:  There  are  no  enlarged  abdominal  or  pelvic  lymph  nodes. \par VASCULATURE:  The  abdominal  aorta  is  normal  in  caliber  and  demonstrates  atherosclerotic  changes. \par BOWEL:  Patient  is  post  rectal  anastomosis.  There  is  a  right  lower  quadrant  loop  ileostomy.  No  bowel  obstruction. \par PERITONEUM/RETROPERITONEUM/MESENTERY:  Presacral  edema.  No  ascites  or  pneumoperitoneum. \par PELVIC  VISCERA:  Unremarkable. \par BONES  AND  SOFT  TISSUES:  No  acute  osseous  abnormality  is  noted.  L4-L5  spondylolysis.  Grade  1  anterolisthesis  of  L5  on  S1.  \par   \par IMPRESSION: \par   \par Post  rectal  anastomosis  and  right  lower  quadrant  ileostomy  without  CT  evidence  of  abdominopelvic  residual  disease. \par   \par Unchanged  6  mm  groundglass  right  lower  lobe  pulmonary  nodule. \par   \par \par \par \par Original  final  report  dictated  and  signed  by  Dr. Eric Cristobal  on  2017  03:50  PM

## 2020-03-05 NOTE — CONSULT LETTER
[Dear  ___] : Dear  [unfilled], [Consult Letter:] : I had the pleasure of evaluating your patient, [unfilled]. [Please see my note below.] : Please see my note below. [Sincerely,] : Sincerely, [FreeTextEntry3] : Shadi Corbett [DrShanell  ___] : Dr. LOVING [DrShanell ___] : Dr. LOVING

## 2020-03-05 NOTE — REVIEW OF SYSTEMS
[Fever] : no fever [Fatigue] : no fatigue [Recent Change In Weight] : ~T no recent weight change [Chest Pain] : no chest pain [Shortness Of Breath] : no shortness of breath [Abdominal Pain] : no abdominal pain [Vomiting] : no vomiting [Diarrhea] : no diarrhea [Dysuria] : no dysuria [Joint Pain] : no joint pain [Skin Rash] : no skin rash [Negative] : Neurological [FreeTextEntry7] : He has a hernia

## 2020-03-05 NOTE — PHYSICAL EXAM
[Fully active, able to carry on all pre-disease performance without restriction] : Status 0 - Fully active, able to carry on all pre-disease performance without restriction [Ulcers] : no ulcers [Mucositis] : no mucositis [Thrush] : no thrush [Vesicles] : no vesicles [Normal] : affect appropriate [de-identified] : Well healed scars from recent wedge.  [de-identified] : surgical scars, small hernia at incision site

## 2020-03-05 NOTE — HISTORY OF PRESENT ILLNESS
[Disease: _____________________] : Disease: [unfilled] [T: ___] : T[unfilled] [N: ___] : N[unfilled] [M: ___] : M[unfilled] [AJCC Stage: ____] : AJCC Stage: [unfilled] [de-identified] : Patient is a 60 year old male who was diagnosed with rectal carcinoma after symptoms of hematochezia, tenesmus. He then underwent colonoscopy with Dr. Dory Carrillo on 04/22/2016, that showed an obstructing circumferential mass at 10 cm from anal verge consistent with adenocarcinoma. Patient was given chemoradiation with xeloda and he finsihed it then underwent laparoscopic LAR with TME and diverting loop ileostomy on 8/30/2016. Patient recovered from surgery except for hospitalization with dehydration from diarrhea postoperatively that resolved. He was started on adjuvant Xelox on 10/4/2016 and completed 4 cycles in December of 2016. [de-identified] : adenocarcinoma [de-identified] :   adenocarcinoma, low grade, well differentiated.  Tumor invades through the muscularis propria into subserosal adipose tissue   \par  [de-identified] : 5/9/17\par He presents to establish care. He is to have his ostomy reversed but was noted to have low WBC on blood work. See bellprincess. Due to his cytopenia  he had a bone marrow biopsy in 2/2017 that showed hypocellular marrrow. Cytogenetics and FISH was negative.\par   He had a CT abd/pelvis on 3/2017 that showed Post rectal anastomosis and right lower quadrant ileostomy without CT evidence of abdominopelvic residual disease.Unchanged 6 mm ground-glass right lower lobe pulmonary nodule. \par \par He had a colonoscopy on April 10th by Dr. Handley and reports it was normal.  He was to have his ostomy reversed by Timmy Arriaga but he was not cleared by his  PMD Dr. Earline Acosta due to low WBC. Repeat CBC showed improvement.\par \par 7/17/17\par He is doing well. He has mild grade 1 neuropathy in feet that he barely feels. His bowel movements are improving.  No weight loss, no bleeding. Ostomy was reversed. .\par \par 8/2/17\par I brought him back for follow up do tof all in his HgB. He states he feels well except for chronic neuropathy. Stools are brown and urine is yellow.\par \par 10/11/17\par He is here for follow. He feels well.  He was started on Oral b12 tabs since his B12 level was low. He has no complaints.\par \par 1/10/18\par He is here for follow up.  He has a CT C/A/P on 10/20/17 that showed stable pulmonary nodules and No evidence of metastatic disease within the abdomen or pelvis.  CEA was 2. He feels well. Was started back on his ACEi for HTN. Normal bowel movements. No bleeding. He stopped his b12 \par \par 7/25/18\par He is here for follow up. He had an US liver on 5/2018 that showed fatty liver. Colonosocpy on 5/2018 by Dr. Handley  shwoed only  mild radiation proctitis next colonosocy in 3 years May 2021. Blood work quest in May 2018  CMP normal, Cholesterol was a bit high. WBC 3.6, HgB 13.9, , Lymphs low 842, Plts 199\par He states he feels fine. HE admits to drinking 3 glasses of wine every night. \par \par 10/17/18\par He is here for follow up. Blodo work from quest 8/8/18 BNP normal,  LFT normal,  Wbc 3.5 normal diff, hgb 13.3, PSA 1.5 .7\par \par 12/3/18\par Patient is here for a follow-up visit.  He is feeling well with no complaints.  Patient denies fever, chills, nausea, vomiting. CEA is stable.\par CT C/A/P 10/30/18 shows New solid left upper lobe pulmonary nodule measuring 5 mm. He has no complaints  \par \par 2/13/19\par Patient is here for a follow-up visit.  He is feeling well with no complaints.  Patient denies fever, chills, nausea, vomiting.  Most recent CEA from 12/6/18 is stable at 1.9ng/mL.  He has restarted Vitamin B12.  \par CT Chest 1/3/19 IMPRESSION: 1. 5 mm left upper lobe solid pulmonary nodule (4/116), stable since 10/30/2018 increased in size since 10/20/2017. Continued follow-up is recommended. 2. Additional bilateral subcentimeter nodules are stable since 5/11/2016. 3. No new suspicious mass or nodule. \par Labs 1/15/19 Normal CMP, TSH 9.68, Ferritin 393, WBC 3.4, Hgb 13.5, , , Folate 8\par \par 6/12/19\par He is here for follow-up of Rectal cancer.   He is doing well. he has no complaints. Still drinks 2-3 glasses of wine every night.\par \par 10/21/19\par He is here for follow up.  Since last visit he had CT C/A/ P on 8/26/19. It showed growth in his STELLA pulmonary nodules. He than had a PET/CT on   9/4/19 7 mm left upper lobe solid nodule is FDG avid, SUV max 4.5, consistent with biologic tumor activity.\par On October 1 st he underwent wedge resection by Dr. Benjamin  path was consistent with Metastatic adenocarcinoma with colorectal primary    NGS no mutations, MMR intact, \par \par He feels well. No complaints. \par \par 12/2/19\par Patient is here for a follow-up visit.  He is feeling well with no new complaints.  Patient denies fever, chills, nausea, vomiting, new pain or bleeding.  He has plans for blood work in early January and will go for repeat scans afterward.  This appointment was scheduled months in advance, due to timing of treatment, he was instructed to come in January; however this visit was never canceled.  \par \par 3/3/20\par He is doing well. He has no issues. He had CT C/A/P on 1/23/20: Post partial left upper lobe resection. \par No significant change in a 7 mm right lower lobe groundglass nodule.IMPRESSION: \par No findings of metastatic disease in the abdomen or pelvis.

## 2020-06-02 ENCOUNTER — LABORATORY RESULT (OUTPATIENT)
Age: 65
End: 2020-06-02

## 2020-06-02 ENCOUNTER — APPOINTMENT (OUTPATIENT)
Dept: HEMATOLOGY ONCOLOGY | Facility: CLINIC | Age: 65
End: 2020-06-02
Payer: MEDICARE

## 2020-06-02 ENCOUNTER — OUTPATIENT (OUTPATIENT)
Dept: OUTPATIENT SERVICES | Facility: HOSPITAL | Age: 65
LOS: 1 days | Discharge: HOME | End: 2020-06-02

## 2020-06-02 VITALS
TEMPERATURE: 97.4 F | RESPIRATION RATE: 17 BRPM | HEIGHT: 69 IN | DIASTOLIC BLOOD PRESSURE: 71 MMHG | SYSTOLIC BLOOD PRESSURE: 145 MMHG | HEART RATE: 70 BPM | BODY MASS INDEX: 22.51 KG/M2 | WEIGHT: 152 LBS

## 2020-06-02 DIAGNOSIS — C78.00 SECONDARY MALIGNANT NEOPLASM OF UNSPECIFIED LUNG: ICD-10-CM

## 2020-06-02 DIAGNOSIS — Z98.890 OTHER SPECIFIED POSTPROCEDURAL STATES: Chronic | ICD-10-CM

## 2020-06-02 DIAGNOSIS — C20 MALIGNANT NEOPLASM OF RECTUM: ICD-10-CM

## 2020-06-02 DIAGNOSIS — R91.1 SOLITARY PULMONARY NODULE: ICD-10-CM

## 2020-06-02 LAB
HCT VFR BLD CALC: 38.3 %
HGB BLD-MCNC: 12.9 G/DL
MCHC RBC-ENTMCNC: 33.7 G/DL
MCHC RBC-ENTMCNC: 34.1 PG
MCV RBC AUTO: 101.3 FL
PLATELET # BLD AUTO: 225 K/UL
PMV BLD: 9.7 FL
RBC # BLD: 3.78 M/UL
RBC # FLD: 11.6 %
WBC # FLD AUTO: 5.01 K/UL

## 2020-06-02 PROCEDURE — 99213 OFFICE O/P EST LOW 20 MIN: CPT

## 2020-06-02 NOTE — RESULTS/DATA
[FreeTextEntry1] : [facility]  [facility],   \par [address]  [place] [address]        [phone] \par \par Patient  Name:  ASIM STREET  :  1955 \par Patient  ID:  [id number] \par Account:  [id number] \par Patient  Location:  [facility] \par \par Accession:  [id number] \par Procedure:  CT  ABDOMEN  PELVIS  W  IV  AND  PO  CONTRAST  [id number] \par Date  of  Exam:  2017  01:52  PM \par Attending  Physician:  ALEX CONSTANTINO \par Requesting  Physician:  ALEX CONSTANTINO MD \par \par \par CLINICAL  STATEMENT:  Rectal  cancer  post  chemotherapy,  radiation  therapy  and  surgery.    \par   \par TECHNIQUE:  Contiguous  axial  CT  images  were  obtained  from  the  lung  bases  to  the  pubic  symphysis  following  administration  of  100cc  Optiray  320  intravenous  contrast.    Oral  contrast  was  administered.    Reformatted  images  in  the  coronal  and  sagittal  planes  were  acquired. \par   \par COMPARISON:    CT  abdomen  pelvis  dated  2016  and  MRI  of  pelvis  dated  2016 \par   \par FINDINGS: \par   \par LUNG  BASES:  There  is  an  unchanged  6  mm  groundglass  right  lower  lobe  pulmonary  nodule  (series  4,  image  42). \par LIVER:  Hepatic  steatosis. \par SPLEEN:  Unremarkable. \par PANCREAS:  Unremarkable. \par GALLBLADDER  AND  BILIARY  TREE:  There  is  cholelithiasis.  No  biliary  ductal  dilatation. \par ADRENALS:  Unremarkable. \par KIDNEYS:  Symmetric  enhancement  without  hydronephrosis. \par LYMPH  NODES:  There  are  no  enlarged  abdominal  or  pelvic  lymph  nodes. \par VASCULATURE:  The  abdominal  aorta  is  normal  in  caliber  and  demonstrates  atherosclerotic  changes. \par BOWEL:  Patient  is  post  rectal  anastomosis.  There  is  a  right  lower  quadrant  loop  ileostomy.  No  bowel  obstruction. \par PERITONEUM/RETROPERITONEUM/MESENTERY:  Presacral  edema.  No  ascites  or  pneumoperitoneum. \par PELVIC  VISCERA:  Unremarkable. \par BONES  AND  SOFT  TISSUES:  No  acute  osseous  abnormality  is  noted.  L4-L5  spondylolysis.  Grade  1  anterolisthesis  of  L5  on  S1.  \par   \par IMPRESSION: \par   \par Post  rectal  anastomosis  and  right  lower  quadrant  ileostomy  without  CT  evidence  of  abdominopelvic  residual  disease. \par   \par Unchanged  6  mm  groundglass  right  lower  lobe  pulmonary  nodule. \par   \par \par \par \par Original  final  report  dictated  and  signed  by  Dr. Eric Cristobal  on  2017  03:50  PM

## 2020-06-02 NOTE — PHYSICAL EXAM
[Fully active, able to carry on all pre-disease performance without restriction] : Status 0 - Fully active, able to carry on all pre-disease performance without restriction [Ulcers] : no ulcers [Mucositis] : no mucositis [Thrush] : no thrush [Vesicles] : no vesicles [Normal] : affect appropriate [de-identified] : Well healed scars from recent wedge.  [de-identified] : surgical scars, small hernia at incision site

## 2020-06-02 NOTE — HISTORY OF PRESENT ILLNESS
[Disease: _____________________] : Disease: [unfilled] [T: ___] : T[unfilled] [N: ___] : N[unfilled] [M: ___] : M[unfilled] [AJCC Stage: ____] : AJCC Stage: [unfilled] [de-identified] : Patient is a 60 year old male who was diagnosed with rectal carcinoma after symptoms of hematochezia, tenesmus. He then underwent colonoscopy with Dr. Dory Carrillo on 04/22/2016, that showed an obstructing circumferential mass at 10 cm from anal verge consistent with adenocarcinoma. Patient was given chemoradiation with xeloda and he finsihed it then underwent laparoscopic LAR with TME and diverting loop ileostomy on 8/30/2016. Patient recovered from surgery except for hospitalization with dehydration from diarrhea postoperatively that resolved. He was started on adjuvant Xelox on 10/4/2016 and completed 4 cycles in December of 2016. [de-identified] : adenocarcinoma [de-identified] :   adenocarcinoma, low grade, well differentiated.  Tumor invades through the muscularis propria into subserosal adipose tissue   \par  [de-identified] : 5/9/17\par He presents to establish care. He is to have his ostomy reversed but was noted to have low WBC on blood work. See bellprincess. Due to his cytopenia  he had a bone marrow biopsy in 2/2017 that showed hypocellular marrrow. Cytogenetics and FISH was negative.\par   He had a CT abd/pelvis on 3/2017 that showed Post rectal anastomosis and right lower quadrant ileostomy without CT evidence of abdominopelvic residual disease.Unchanged 6 mm ground-glass right lower lobe pulmonary nodule. \par \par He had a colonoscopy on April 10th by Dr. Handley and reports it was normal.  He was to have his ostomy reversed by Timmy Arriaga but he was not cleared by his  PMD Dr. Earline Acosta due to low WBC. Repeat CBC showed improvement.\par \par 7/17/17\par He is doing well. He has mild grade 1 neuropathy in feet that he barely feels. His bowel movements are improving.  No weight loss, no bleeding. Ostomy was reversed. .\par \par 8/2/17\par I brought him back for follow up do tof all in his HgB. He states he feels well except for chronic neuropathy. Stools are brown and urine is yellow.\par \par 10/11/17\par He is here for follow. He feels well.  He was started on Oral b12 tabs since his B12 level was low. He has no complaints.\par \par 1/10/18\par He is here for follow up.  He has a CT C/A/P on 10/20/17 that showed stable pulmonary nodules and No evidence of metastatic disease within the abdomen or pelvis.  CEA was 2. He feels well. Was started back on his ACEi for HTN. Normal bowel movements. No bleeding. He stopped his b12 \par \par 7/25/18\par He is here for follow up. He had an US liver on 5/2018 that showed fatty liver. Colonosocpy on 5/2018 by Dr. Handley  shwoed only  mild radiation proctitis next colonosocy in 3 years May 2021. Blood work quest in May 2018  CMP normal, Cholesterol was a bit high. WBC 3.6, HgB 13.9, , Lymphs low 842, Plts 199\par He states he feels fine. HE admits to drinking 3 glasses of wine every night. \par \par 10/17/18\par He is here for follow up. Blodo work from quest 8/8/18 BNP normal,  LFT normal,  Wbc 3.5 normal diff, hgb 13.3, PSA 1.5 .7\par \par 12/3/18\par Patient is here for a follow-up visit.  He is feeling well with no complaints.  Patient denies fever, chills, nausea, vomiting. CEA is stable.\par CT C/A/P 10/30/18 shows New solid left upper lobe pulmonary nodule measuring 5 mm. He has no complaints  \par \par 2/13/19\par Patient is here for a follow-up visit.  He is feeling well with no complaints.  Patient denies fever, chills, nausea, vomiting.  Most recent CEA from 12/6/18 is stable at 1.9ng/mL.  He has restarted Vitamin B12.  \par CT Chest 1/3/19 IMPRESSION: 1. 5 mm left upper lobe solid pulmonary nodule (4/116), stable since 10/30/2018 increased in size since 10/20/2017. Continued follow-up is recommended. 2. Additional bilateral subcentimeter nodules are stable since 5/11/2016. 3. No new suspicious mass or nodule. \par Labs 1/15/19 Normal CMP, TSH 9.68, Ferritin 393, WBC 3.4, Hgb 13.5, , , Folate 8\par \par 6/12/19\par He is here for follow-up of Rectal cancer.   He is doing well. he has no complaints. Still drinks 2-3 glasses of wine every night.\par \par 10/21/19\par He is here for follow up.  Since last visit he had CT C/A/ P on 8/26/19. It showed growth in his STELLA pulmonary nodules. He than had a PET/CT on   9/4/19 7 mm left upper lobe solid nodule is FDG avid, SUV max 4.5, consistent with biologic tumor activity.\par On October 1 st he underwent wedge resection by Dr. Benjamin  path was consistent with Metastatic adenocarcinoma with colorectal primary    NGS no mutations, MMR intact, \par \par He feels well. No complaints. \par \par 12/2/19\par Patient is here for a follow-up visit.  He is feeling well with no new complaints.  Patient denies fever, chills, nausea, vomiting, new pain or bleeding.  He has plans for blood work in early January and will go for repeat scans afterward.  This appointment was scheduled months in advance, due to timing of treatment, he was instructed to come in January; however this visit was never canceled.  \par \par 3/3/20\par He is doing well. He has no issues. He had CT C/A/P on 1/23/20: Post partial left upper lobe resection. \par No significant change in a 7 mm right lower lobe groundglass nodule.IMPRESSION: \par No findings of metastatic disease in the abdomen or pelvis.\par \par \par 6/2/20\par He is here for follow up. He has had constipation and with straining he started having pain in his right inguinal hernia and is pending to see Dr. John of surgery, Otherwise he is doing well.

## 2020-06-02 NOTE — ASSESSMENT
[FreeTextEntry1] : 1 Stage III rectal cancer status post chemoradiation and LAR/ileostomy and 4 cycles of adjuvant chemotherapy with XELOX. His STELLA nodule kept growing and underwent wedge resection On  October 1st 2019 which was consistent with oligometastatic disease. No chemotherapy is warranted at this time.\par - Has CT C/A/P 1/2020 was LIZZ \par - CBC, CMP CEA today \par - next colonoscopy may 2021\par -will check CT  C/A/P in July of 2020, ordered \par \par 2. Decrease white blood cell count as well as macrocytosis and mild  anemia   is stable \par - he had a bone marrow that did not demonstrate any pathology possibly has ICUS or due to ETOH use \par - I suspect this is due this Alcohol use since he claims 3 glasses of wine daily  as and increase MCV may bee seen with Fatty liver as well which he has\par - I recommended only  one glass of wine a day\par \par RTC in  6 months. [Curative] : Goals of care discussed with patient: Curative

## 2020-06-04 LAB
ALBUMIN SERPL ELPH-MCNC: 4.7 G/DL
ALP BLD-CCNC: 53 U/L
ALT SERPL-CCNC: 25 U/L
ANION GAP SERPL CALC-SCNC: 14 MMOL/L
AST SERPL-CCNC: 26 U/L
BILIRUB SERPL-MCNC: 0.4 MG/DL
BUN SERPL-MCNC: 16 MG/DL
CALCIUM SERPL-MCNC: 9.7 MG/DL
CEA SERPL-MCNC: 1.2 NG/ML
CHLORIDE SERPL-SCNC: 99 MMOL/L
CO2 SERPL-SCNC: 26 MMOL/L
CREAT SERPL-MCNC: 1.4 MG/DL
GLUCOSE SERPL-MCNC: 111 MG/DL
POTASSIUM SERPL-SCNC: 4.5 MMOL/L
PROT SERPL-MCNC: 7 G/DL
SODIUM SERPL-SCNC: 139 MMOL/L

## 2020-06-10 ENCOUNTER — RESULT REVIEW (OUTPATIENT)
Age: 65
End: 2020-06-10

## 2020-06-10 ENCOUNTER — OUTPATIENT (OUTPATIENT)
Dept: OUTPATIENT SERVICES | Facility: HOSPITAL | Age: 65
LOS: 1 days | Discharge: HOME | End: 2020-06-10
Payer: MEDICARE

## 2020-06-10 DIAGNOSIS — C20 MALIGNANT NEOPLASM OF RECTUM: ICD-10-CM

## 2020-06-10 DIAGNOSIS — Z98.890 OTHER SPECIFIED POSTPROCEDURAL STATES: Chronic | ICD-10-CM

## 2020-06-10 DIAGNOSIS — R10.9 UNSPECIFIED ABDOMINAL PAIN: ICD-10-CM

## 2020-06-10 DIAGNOSIS — R10.2 PELVIC AND PERINEAL PAIN: ICD-10-CM

## 2020-06-10 PROCEDURE — 71260 CT THORAX DX C+: CPT | Mod: 26

## 2020-06-10 PROCEDURE — 74177 CT ABD & PELVIS W/CONTRAST: CPT | Mod: 26

## 2020-07-20 ENCOUNTER — OUTPATIENT (OUTPATIENT)
Dept: OUTPATIENT SERVICES | Facility: HOSPITAL | Age: 65
LOS: 1 days | Discharge: HOME | End: 2020-07-20
Payer: MEDICARE

## 2020-07-20 VITALS
TEMPERATURE: 98 F | HEART RATE: 76 BPM | WEIGHT: 154.32 LBS | DIASTOLIC BLOOD PRESSURE: 64 MMHG | HEIGHT: 69 IN | SYSTOLIC BLOOD PRESSURE: 126 MMHG | OXYGEN SATURATION: 98 % | RESPIRATION RATE: 16 BRPM

## 2020-07-20 DIAGNOSIS — Z98.890 OTHER SPECIFIED POSTPROCEDURAL STATES: Chronic | ICD-10-CM

## 2020-07-20 DIAGNOSIS — K40.90 UNILATERAL INGUINAL HERNIA, WITHOUT OBSTRUCTION OR GANGRENE, NOT SPECIFIED AS RECURRENT: ICD-10-CM

## 2020-07-20 DIAGNOSIS — Z01.818 ENCOUNTER FOR OTHER PREPROCEDURAL EXAMINATION: ICD-10-CM

## 2020-07-20 LAB
ALBUMIN SERPL ELPH-MCNC: 5.2 G/DL — SIGNIFICANT CHANGE UP (ref 3.5–5.2)
ALP SERPL-CCNC: 52 U/L — SIGNIFICANT CHANGE UP (ref 30–115)
ALT FLD-CCNC: 28 U/L — SIGNIFICANT CHANGE UP (ref 0–41)
ANION GAP SERPL CALC-SCNC: 12 MMOL/L — SIGNIFICANT CHANGE UP (ref 7–14)
APPEARANCE UR: CLEAR — SIGNIFICANT CHANGE UP
APTT BLD: 28.8 SEC — SIGNIFICANT CHANGE UP (ref 27–39.2)
AST SERPL-CCNC: 34 U/L — SIGNIFICANT CHANGE UP (ref 0–41)
BASOPHILS # BLD AUTO: 0.03 K/UL — SIGNIFICANT CHANGE UP (ref 0–0.2)
BASOPHILS NFR BLD AUTO: 0.9 % — SIGNIFICANT CHANGE UP (ref 0–1)
BILIRUB SERPL-MCNC: 0.5 MG/DL — SIGNIFICANT CHANGE UP (ref 0.2–1.2)
BILIRUB UR-MCNC: NEGATIVE — SIGNIFICANT CHANGE UP
BUN SERPL-MCNC: 20 MG/DL — SIGNIFICANT CHANGE UP (ref 10–20)
CALCIUM SERPL-MCNC: 10.1 MG/DL — SIGNIFICANT CHANGE UP (ref 8.5–10.1)
CHLORIDE SERPL-SCNC: 102 MMOL/L — SIGNIFICANT CHANGE UP (ref 98–110)
CO2 SERPL-SCNC: 25 MMOL/L — SIGNIFICANT CHANGE UP (ref 17–32)
COLOR SPEC: YELLOW — SIGNIFICANT CHANGE UP
CREAT SERPL-MCNC: 1.4 MG/DL — SIGNIFICANT CHANGE UP (ref 0.7–1.5)
DIFF PNL FLD: NEGATIVE — SIGNIFICANT CHANGE UP
EOSINOPHIL # BLD AUTO: 0.19 K/UL — SIGNIFICANT CHANGE UP (ref 0–0.7)
EOSINOPHIL NFR BLD AUTO: 5.4 % — SIGNIFICANT CHANGE UP (ref 0–8)
GLUCOSE SERPL-MCNC: 119 MG/DL — HIGH (ref 70–99)
GLUCOSE UR QL: ABNORMAL
HCT VFR BLD CALC: 40.4 % — LOW (ref 42–52)
HGB BLD-MCNC: 13.6 G/DL — LOW (ref 14–18)
IMM GRANULOCYTES NFR BLD AUTO: 0.3 % — SIGNIFICANT CHANGE UP (ref 0.1–0.3)
INR BLD: 0.94 RATIO — SIGNIFICANT CHANGE UP (ref 0.65–1.3)
KETONES UR-MCNC: NEGATIVE — SIGNIFICANT CHANGE UP
LEUKOCYTE ESTERASE UR-ACNC: NEGATIVE — SIGNIFICANT CHANGE UP
LYMPHOCYTES # BLD AUTO: 0.62 K/UL — LOW (ref 1.2–3.4)
LYMPHOCYTES # BLD AUTO: 17.8 % — LOW (ref 20.5–51.1)
MCHC RBC-ENTMCNC: 33.7 G/DL — SIGNIFICANT CHANGE UP (ref 32–37)
MCHC RBC-ENTMCNC: 34.2 PG — HIGH (ref 27–31)
MCV RBC AUTO: 101.5 FL — HIGH (ref 80–94)
MONOCYTES # BLD AUTO: 0.46 K/UL — SIGNIFICANT CHANGE UP (ref 0.1–0.6)
MONOCYTES NFR BLD AUTO: 13.2 % — HIGH (ref 1.7–9.3)
NEUTROPHILS # BLD AUTO: 2.18 K/UL — SIGNIFICANT CHANGE UP (ref 1.4–6.5)
NEUTROPHILS NFR BLD AUTO: 62.4 % — SIGNIFICANT CHANGE UP (ref 42.2–75.2)
NITRITE UR-MCNC: NEGATIVE — SIGNIFICANT CHANGE UP
NRBC # BLD: 0 /100 WBCS — SIGNIFICANT CHANGE UP (ref 0–0)
PH UR: 6 — SIGNIFICANT CHANGE UP (ref 5–8)
PLATELET # BLD AUTO: 237 K/UL — SIGNIFICANT CHANGE UP (ref 130–400)
POTASSIUM SERPL-MCNC: 4.4 MMOL/L — SIGNIFICANT CHANGE UP (ref 3.5–5)
POTASSIUM SERPL-SCNC: 4.4 MMOL/L — SIGNIFICANT CHANGE UP (ref 3.5–5)
PROT SERPL-MCNC: 7.5 G/DL — SIGNIFICANT CHANGE UP (ref 6–8)
PROT UR-MCNC: SIGNIFICANT CHANGE UP
PROTHROM AB SERPL-ACNC: 10.8 SEC — SIGNIFICANT CHANGE UP (ref 9.95–12.87)
RBC # BLD: 3.98 M/UL — LOW (ref 4.7–6.1)
RBC # FLD: 11.7 % — SIGNIFICANT CHANGE UP (ref 11.5–14.5)
SODIUM SERPL-SCNC: 139 MMOL/L — SIGNIFICANT CHANGE UP (ref 135–146)
SP GR SPEC: 1.02 — SIGNIFICANT CHANGE UP (ref 1.01–1.02)
UROBILINOGEN FLD QL: SIGNIFICANT CHANGE UP
WBC # BLD: 3.49 K/UL — LOW (ref 4.8–10.8)
WBC # FLD AUTO: 3.49 K/UL — LOW (ref 4.8–10.8)

## 2020-07-20 PROCEDURE — 93010 ELECTROCARDIOGRAM REPORT: CPT

## 2020-07-20 NOTE — H&P PST ADULT - NSICDXPASTSURGICALHX_GEN_ALL_CORE_FT
PAST SURGICAL HISTORY:  H/O arthroscopic knee surgery     History of lung surgery left lung 10/2019 partial lobe resection    History of rectal surgery RESECTION AND RECONNECTION

## 2020-07-20 NOTE — H&P PST ADULT - NSICDXPASTMEDICALHX_GEN_ALL_CORE_FT
PAST MEDICAL HISTORY:  High triglycerides     HTN (hypertension)     Rectal cancer [id number], last chemo 1/2017

## 2020-07-20 NOTE — H&P PST ADULT - HISTORY OF PRESENT ILLNESS
66 yo male presents with c/o right inguinal hernia per pt noted 5/2020; scheduled for repair  denies chest pain, palpitations, shortness of breath, dyspnea, or dysuria. exercise tolerance: walks mile w/o sob  pt denies any known exposure to COVID-19, denies any S&S  pt instructed re: self quarantine guidelines

## 2020-07-21 ENCOUNTER — OUTPATIENT (OUTPATIENT)
Dept: OUTPATIENT SERVICES | Facility: HOSPITAL | Age: 65
LOS: 1 days | Discharge: HOME | End: 2020-07-21

## 2020-07-21 DIAGNOSIS — Z98.890 OTHER SPECIFIED POSTPROCEDURAL STATES: Chronic | ICD-10-CM

## 2020-07-21 DIAGNOSIS — Z11.59 ENCOUNTER FOR SCREENING FOR OTHER VIRAL DISEASES: ICD-10-CM

## 2020-07-21 PROBLEM — C20 MALIGNANT NEOPLASM OF RECTUM: Chronic | Status: ACTIVE | Noted: 2019-09-25

## 2020-07-24 ENCOUNTER — RESULT REVIEW (OUTPATIENT)
Age: 65
End: 2020-07-24

## 2020-07-24 ENCOUNTER — OUTPATIENT (OUTPATIENT)
Dept: OUTPATIENT SERVICES | Facility: HOSPITAL | Age: 65
LOS: 1 days | Discharge: HOME | End: 2020-07-24
Payer: MEDICARE

## 2020-07-24 VITALS
TEMPERATURE: 98 F | HEIGHT: 69 IN | DIASTOLIC BLOOD PRESSURE: 65 MMHG | OXYGEN SATURATION: 98 % | SYSTOLIC BLOOD PRESSURE: 117 MMHG | HEART RATE: 54 BPM | WEIGHT: 154.98 LBS

## 2020-07-24 VITALS
OXYGEN SATURATION: 100 % | SYSTOLIC BLOOD PRESSURE: 140 MMHG | RESPIRATION RATE: 15 BRPM | HEART RATE: 59 BPM | DIASTOLIC BLOOD PRESSURE: 70 MMHG

## 2020-07-24 DIAGNOSIS — Z98.890 OTHER SPECIFIED POSTPROCEDURAL STATES: Chronic | ICD-10-CM

## 2020-07-24 PROCEDURE — 88302 TISSUE EXAM BY PATHOLOGIST: CPT | Mod: 26

## 2020-07-24 RX ORDER — SODIUM CHLORIDE 9 MG/ML
1000 INJECTION, SOLUTION INTRAVENOUS
Refills: 0 | Status: DISCONTINUED | OUTPATIENT
Start: 2020-07-24 | End: 2020-07-24

## 2020-07-24 RX ORDER — ONDANSETRON 8 MG/1
4 TABLET, FILM COATED ORAL
Refills: 0 | Status: DISCONTINUED | OUTPATIENT
Start: 2020-07-24 | End: 2020-07-24

## 2020-07-24 RX ORDER — ACETAMINOPHEN WITH CODEINE 300MG-30MG
1 TABLET ORAL
Qty: 8 | Refills: 0
Start: 2020-07-24

## 2020-07-24 RX ORDER — HYDROMORPHONE HYDROCHLORIDE 2 MG/ML
1 INJECTION INTRAMUSCULAR; INTRAVENOUS; SUBCUTANEOUS
Refills: 0 | Status: DISCONTINUED | OUTPATIENT
Start: 2020-07-24 | End: 2020-07-24

## 2020-07-24 RX ORDER — HYDROMORPHONE HYDROCHLORIDE 2 MG/ML
0.5 INJECTION INTRAMUSCULAR; INTRAVENOUS; SUBCUTANEOUS
Refills: 0 | Status: DISCONTINUED | OUTPATIENT
Start: 2020-07-24 | End: 2020-07-24

## 2020-07-24 RX ORDER — MEPERIDINE HYDROCHLORIDE 50 MG/ML
12.5 INJECTION INTRAMUSCULAR; INTRAVENOUS; SUBCUTANEOUS
Refills: 0 | Status: DISCONTINUED | OUTPATIENT
Start: 2020-07-24 | End: 2020-07-24

## 2020-07-24 RX ADMIN — HYDROMORPHONE HYDROCHLORIDE 0.5 MILLIGRAM(S): 2 INJECTION INTRAMUSCULAR; INTRAVENOUS; SUBCUTANEOUS at 12:33

## 2020-07-24 RX ADMIN — HYDROMORPHONE HYDROCHLORIDE 0.5 MILLIGRAM(S): 2 INJECTION INTRAMUSCULAR; INTRAVENOUS; SUBCUTANEOUS at 11:29

## 2020-07-24 NOTE — ASU PATIENT PROFILE, ADULT - VISION (WITH CORRECTIVE LENSES IF THE PATIENT USUALLY WEARS THEM):
Partially impaired: cannot see medication labels or newsprint, but can see obstacles in path, and the surrounding layout; can count fingers at arm's length/glasses in locker H

## 2020-07-24 NOTE — PRE-ANESTHESIA EVALUATION ADULT - NSANTHADDINFOFT_GEN_ALL_CORE
66 y/o man evaluated for laparoscopic repair of RIH.  ASA 3.  Plan GA.  Plan, benefits, foreseeable risks, viable alternatives discussed with patient and all his pertinent questions answered and he understands and elects to proceed.

## 2020-07-24 NOTE — ASU DISCHARGE PLAN (ADULT/PEDIATRIC) - ASU DC SPECIAL INSTRUCTIONSFT
You are being discharged from [facility]. Please schedule a follow up appointment with Dr. John  as previously discussed in the next 7-10days. You can take tylenol and motrin around the clock for the next 3 days for mild pain. You have been prescribed Tylenol #3 for moderate to severe pain; please take as directed. You may remove your dressing in 48h. Please avoid heavy weight lifting for the next 4-6 weeks.  If you have any further questions about your care, please do not hesitate to contact Dr. John's clinic.

## 2020-07-24 NOTE — ASU PATIENT PROFILE, ADULT - PSH
H/O arthroscopic knee surgery    History of lung surgery  left lung 10/2019 partial lobe resection  History of rectal surgery  RESECTION AND RECONNECTION

## 2020-07-26 ENCOUNTER — TRANSCRIPTION ENCOUNTER (OUTPATIENT)
Age: 65
End: 2020-07-26

## 2020-07-27 LAB — SURGICAL PATHOLOGY STUDY: SIGNIFICANT CHANGE UP

## 2020-07-29 DIAGNOSIS — K40.30 UNILATERAL INGUINAL HERNIA, WITH OBSTRUCTION, WITHOUT GANGRENE, NOT SPECIFIED AS RECURRENT: ICD-10-CM

## 2020-07-29 DIAGNOSIS — Z87.891 PERSONAL HISTORY OF NICOTINE DEPENDENCE: ICD-10-CM

## 2020-07-29 DIAGNOSIS — Z88.0 ALLERGY STATUS TO PENICILLIN: ICD-10-CM

## 2020-11-30 ENCOUNTER — APPOINTMENT (OUTPATIENT)
Dept: HEMATOLOGY ONCOLOGY | Facility: CLINIC | Age: 65
End: 2020-11-30
Payer: MEDICARE

## 2020-11-30 ENCOUNTER — OUTPATIENT (OUTPATIENT)
Dept: OUTPATIENT SERVICES | Facility: HOSPITAL | Age: 65
LOS: 1 days | Discharge: HOME | End: 2020-11-30

## 2020-11-30 ENCOUNTER — LABORATORY RESULT (OUTPATIENT)
Age: 65
End: 2020-11-30

## 2020-11-30 VITALS
BODY MASS INDEX: 23.7 KG/M2 | SYSTOLIC BLOOD PRESSURE: 128 MMHG | WEIGHT: 160 LBS | DIASTOLIC BLOOD PRESSURE: 68 MMHG | HEART RATE: 61 BPM | HEIGHT: 69 IN | TEMPERATURE: 97.1 F | RESPIRATION RATE: 17 BRPM

## 2020-11-30 DIAGNOSIS — Z98.890 OTHER SPECIFIED POSTPROCEDURAL STATES: Chronic | ICD-10-CM

## 2020-11-30 LAB
ALBUMIN SERPL ELPH-MCNC: 4.8 G/DL
ALP BLD-CCNC: 51 U/L
ALT SERPL-CCNC: 27 U/L
ANION GAP SERPL CALC-SCNC: 13 MMOL/L
AST SERPL-CCNC: 29 U/L
BILIRUB SERPL-MCNC: 0.4 MG/DL
BUN SERPL-MCNC: 22 MG/DL
CALCIUM SERPL-MCNC: 10.1 MG/DL
CHLORIDE SERPL-SCNC: 100 MMOL/L
CO2 SERPL-SCNC: 24 MMOL/L
CREAT SERPL-MCNC: 1.4 MG/DL
GLUCOSE SERPL-MCNC: 104 MG/DL
HCT VFR BLD CALC: 39 %
HGB BLD-MCNC: 13.3 G/DL
MCHC RBC-ENTMCNC: 34.1 G/DL
MCHC RBC-ENTMCNC: 34.5 PG
MCV RBC AUTO: 101 FL
PLATELET # BLD AUTO: 206 K/UL
PMV BLD: 9.7 FL
POTASSIUM SERPL-SCNC: 4.4 MMOL/L
PROT SERPL-MCNC: 7.1 G/DL
RBC # BLD: 3.86 M/UL
RBC # FLD: 11.9 %
SODIUM SERPL-SCNC: 137 MMOL/L
WBC # FLD AUTO: 3 K/UL

## 2020-11-30 PROCEDURE — 99213 OFFICE O/P EST LOW 20 MIN: CPT

## 2020-11-30 NOTE — REVIEW OF SYSTEMS
[Negative] : Neurological [Fever] : no fever [Fatigue] : no fatigue [Recent Change In Weight] : ~T no recent weight change [Chest Pain] : no chest pain [Shortness Of Breath] : no shortness of breath [Abdominal Pain] : no abdominal pain [Vomiting] : no vomiting [Diarrhea] : no diarrhea [Dysuria] : no dysuria [Joint Pain] : no joint pain [Skin Rash] : no skin rash

## 2020-11-30 NOTE — RESULTS/DATA
[FreeTextEntry1] : [facility],   \par [address]  [place] [address]        [phone] \par \par Patient  Name:  [address]  :  1955 \par Patient  ID:  [id number] \par Account:  [id number] \par Patient  Location:  [facility] \par \par Accession:  [id number] \par Procedure:  CT  ABDOMEN  PELVIS  W  IV  AND  PO  CONTRAST  [id number] \par Date  of  Exam:  2017  01:52  PM \par Attending  Physician:  ALEX CONSTANTINO \par Requesting  Physician:  ALEX CONSTANTINO MD \par \par \par CLINICAL  STATEMENT:  Rectal  cancer  post  chemotherapy,  radiation  therapy  and  surgery.    \par   \par TECHNIQUE:  Contiguous  axial  CT  images  were  obtained  from  the  lung  bases  to  the  pubic  symphysis  following  administration  of  100cc  Optiray  320  intravenous  contrast.    Oral  contrast  was  administered.    Reformatted  images  in  the  coronal  and  sagittal  planes  were  acquired. \par   \par COMPARISON:    CT  abdomen  pelvis  dated  2016  and  MRI  of  pelvis  dated  2016 \par   \par FINDINGS: \par   \par LUNG  BASES:  There  is  an  unchanged  6  mm  groundglass  right  lower  lobe  pulmonary  nodule  (series  4,  image  42). \par LIVER:  Hepatic  steatosis. \par SPLEEN:  Unremarkable. \par PANCREAS:  Unremarkable. \par GALLBLADDER  AND  BILIARY  TREE:  There  is  cholelithiasis.  No  biliary  ductal  dilatation. \par ADRENALS:  Unremarkable. \par KIDNEYS:  Symmetric  enhancement  without  hydronephrosis. \par LYMPH  NODES:  There  are  no  enlarged  abdominal  or  pelvic  lymph  nodes. \par VASCULATURE:  The  abdominal  aorta  is  normal  in  caliber  and  demonstrates  atherosclerotic  changes. \par BOWEL:  Patient  is  post  rectal  anastomosis.  There  is  a  right  lower  quadrant  loop  ileostomy.  No  bowel  obstruction. \par PERITONEUM/RETROPERITONEUM/MESENTERY:  Presacral  edema.  No  ascites  or  pneumoperitoneum. \par PELVIC  VISCERA:  Unremarkable. \par BONES  AND  SOFT  TISSUES:  No  acute  osseous  abnormality  is  noted.  L4-L5  spondylolysis.  Grade  1  anterolisthesis  of  L5  on  S1.  \par   \par IMPRESSION: \par   \par Post  rectal  anastomosis  and  right  lower  quadrant  ileostomy  without  CT  evidence  of  abdominopelvic  residual  disease. \par   \par Unchanged  6  mm  groundglass  right  lower  lobe  pulmonary  nodule. \par   \par \par \par \par Original  final  report  dictated  and  signed  by  Dr. Eric Cristobal  on  2017  03:50  PM

## 2020-11-30 NOTE — PHYSICAL EXAM
[Fully active, able to carry on all pre-disease performance without restriction] : Status 0 - Fully active, able to carry on all pre-disease performance without restriction [Normal] : affect appropriate [Ulcers] : no ulcers [Mucositis] : no mucositis [Thrush] : no thrush [Vesicles] : no vesicles

## 2020-11-30 NOTE — HISTORY OF PRESENT ILLNESS
[Disease: _____________________] : Disease: [unfilled] [T: ___] : T[unfilled] [N: ___] : N[unfilled] [M: ___] : M[unfilled] [AJCC Stage: ____] : AJCC Stage: [unfilled] [de-identified] : Patient is a 60 year old male who was diagnosed with rectal carcinoma after symptoms of hematochezia, tenesmus. He then underwent colonoscopy with Dr. Dory Carrillo on 04/22/2016, that showed an obstructing circumferential mass at 10 cm from anal verge consistent with adenocarcinoma. Patient was given chemoradiation with xeloda and he finsihed it then underwent laparoscopic LAR with TME and diverting loop ileostomy on 8/30/2016. Patient recovered from surgery except for hospitalization with dehydration from diarrhea postoperatively that resolved. He was started on adjuvant Xelox on 10/4/2016 and completed 4 cycles in December of 2016. [de-identified] : adenocarcinoma [de-identified] :   adenocarcinoma, low grade, well differentiated.  Tumor invades through the muscularis propria into subserosal adipose tissue   \par  [de-identified] : 5/9/17\par He presents to establish care. He is to have his ostomy reversed but was noted to have low WBC on blood work. See [facility]princess. Due to his cytopenia  he had a bone marrow biopsy in 2/2017 that showed hypocellular marrrow. Cytogenetics and FISH was negative.\par   He had a CT abd/pelvis on 3/2017 that showed Post rectal anastomosis and right lower quadrant ileostomy without CT evidence of abdominopelvic residual disease.Unchanged 6 mm ground-glass right lower lobe pulmonary nodule. \par \par He had a colonoscopy on April 10th by Dr. Handley and reports it was normal.  He was to have his ostomy reversed by Timmy Arriaga but he was not cleared by his  PMD Dr. Earline Acosta due to low WBC. Repeat CBC showed improvement.\par \par 7/17/17\par He is doing well. He has mild grade 1 neuropathy in feet that he barely feels. His bowel movements are improving.  No weight loss, no bleeding. Ostomy was reversed. .\par \par 8/2/17\par I brought him back for follow up do tof all in his HgB. He states he feels well except for chronic neuropathy. Stools are brown and urine is yellow.\par \par 10/11/17\par He is here for follow. He feels well.  He was started on Oral b12 tabs since his B12 level was low. He has no complaints.\par \par 1/10/18\par He is here for follow up.  He has a CT C/A/P on 10/20/17 that showed stable pulmonary nodules and No evidence of metastatic disease within the abdomen or pelvis.  CEA was 2. He feels well. Was started back on his ACEi for HTN. Normal bowel movements. No bleeding. He stopped his b12 \par \par 7/25/18\par He is here for follow up. He had an US liver on 5/2018 that showed fatty liver. Colonosocpy on 5/2018 by Dr. Handley  shwoed only  mild radiation proctitis next colonosocy in 3 years May 2021. Blood work [facility] in May 2018  CMP normal, Cholesterol was a bit high. WBC 3.6, HgB 13.9, , Lymphs low 842, Plts 199\par He states he feels fine. HE admits to drinking 3 glasses of wine every night. \par \par 10/17/18\par He is here for follow up. Blodo work from [facility] 8/8/18 BNP normal,  LFT normal,  Wbc 3.5 normal diff, hgb 13.3, PSA 1.5 .7\par \par 12/3/18\par Patient is here for a follow-up visit.  He is feeling well with no complaints.  Patient denies fever, chills, nausea, vomiting. CEA is stable.\par CT C/A/P 10/30/18 shows New solid left upper lobe pulmonary nodule measuring 5 mm. He has no complaints  \par \par 2/13/19\par Patient is here for a follow-up visit.  He is feeling well with no complaints.  Patient denies fever, chills, nausea, vomiting.  Most recent CEA from 12/6/18 is stable at 1.9ng/mL.  He has restarted Vitamin B12.  \par CT Chest 1/3/19 IMPRESSION: 1. 5 mm left upper lobe solid pulmonary nodule (4/116), stable since 10/30/2018 increased in size since 10/20/2017. Continued follow-up is recommended. 2. Additional bilateral subcentimeter nodules are stable since 5/11/2016. 3. No new suspicious mass or nodule. \par Labs 1/15/19 Normal CMP, TSH 9.68, Ferritin 393, WBC 3.4, Hgb 13.5, , , Folate 8\par \par 6/12/19\par He is here for follow-up of Rectal cancer.   He is doing well. he has no complaints. Still drinks 2-3 glasses of wine every night.\par \par 10/21/19\par He is here for follow up.  Since last visit he had CT C/A/ P on 8/26/19. It showed growth in his STELLA pulmonary nodules. He than had a PET/CT on   9/4/19 7 mm left upper lobe solid nodule is FDG avid, SUV max 4.5, consistent with biologic tumor activity.\par On October 1 st he underwent wedge resection by Dr. Benjamin  path was consistent with Metastatic adenocarcinoma with colorectal primary    NGS no mutations, MMR intact, \par \par He feels well. No complaints. \par \par 12/2/19\par Patient is here for a follow-up visit.  He is feeling well with no new complaints.  Patient denies fever, chills, nausea, vomiting, new pain or bleeding.  He has plans for blood work in early January and will go for repeat scans afterward.  This appointment was scheduled months in advance, due to timing of treatment, he was instructed to come in January; however this visit was never canceled.  \par \par 3/3/20\par He is doing well. He has no issues. He had CT C/A/P on 1/23/20: Post partial left upper lobe resection. \par No significant change in a 7 mm right lower lobe groundglass nodule.IMPRESSION: \par No findings of metastatic disease in the abdomen or pelvis.\par \par \par 6/2/20\par He is here for follow up. He has had constipation and with straining he started having pain in his right inguinal hernia and is pending to see Dr. John of surgery, Otherwise he is doing well.  \par \par 11/30/20\par He is here for follow up He had hernia repair in July with path showing only a hernia sac.  CEA ahs been normal.  CT C/A/P in June 2020 \par IMPRESSION:\par Since 1/23/2020,\par 1.  No significant interval change.\par 2.  Stable 7 mm right lower lobe groundglass nodule.\par 3.  Stable post partial left upper lobe resection.\par \par IMPRESSION:\par \par 1.  No findings of metastatic disease in the abdomen or pelvis.\par \par He feels well and has no complaints. Exam limited  due to COVID

## 2020-12-01 LAB — CEA SERPL-MCNC: 1.5 NG/ML

## 2020-12-04 DIAGNOSIS — C20 MALIGNANT NEOPLASM OF RECTUM: ICD-10-CM

## 2020-12-04 DIAGNOSIS — C78.00 SECONDARY MALIGNANT NEOPLASM OF UNSPECIFIED LUNG: ICD-10-CM

## 2020-12-13 ENCOUNTER — OUTPATIENT (OUTPATIENT)
Dept: OUTPATIENT SERVICES | Facility: HOSPITAL | Age: 65
LOS: 1 days | Discharge: HOME | End: 2020-12-13
Payer: MEDICARE

## 2020-12-13 ENCOUNTER — RESULT REVIEW (OUTPATIENT)
Age: 65
End: 2020-12-13

## 2020-12-13 DIAGNOSIS — Z98.890 OTHER SPECIFIED POSTPROCEDURAL STATES: Chronic | ICD-10-CM

## 2020-12-13 DIAGNOSIS — C78.00 SECONDARY MALIGNANT NEOPLASM OF UNSPECIFIED LUNG: ICD-10-CM

## 2020-12-13 PROCEDURE — 74177 CT ABD & PELVIS W/CONTRAST: CPT | Mod: 26

## 2020-12-13 PROCEDURE — 71260 CT THORAX DX C+: CPT | Mod: 26

## 2021-01-28 ENCOUNTER — NON-APPOINTMENT (OUTPATIENT)
Age: 66
End: 2021-01-28

## 2021-05-11 ENCOUNTER — OUTPATIENT (OUTPATIENT)
Dept: OUTPATIENT SERVICES | Facility: HOSPITAL | Age: 66
LOS: 1 days | Discharge: HOME | End: 2021-05-11
Payer: MEDICARE

## 2021-05-11 VITALS
HEART RATE: 75 BPM | TEMPERATURE: 98 F | HEIGHT: 69 IN | SYSTOLIC BLOOD PRESSURE: 132 MMHG | RESPIRATION RATE: 20 BRPM | DIASTOLIC BLOOD PRESSURE: 71 MMHG | WEIGHT: 154.32 LBS | OXYGEN SATURATION: 100 %

## 2021-05-11 DIAGNOSIS — K40.90 UNILATERAL INGUINAL HERNIA, WITHOUT OBSTRUCTION OR GANGRENE, NOT SPECIFIED AS RECURRENT: ICD-10-CM

## 2021-05-11 DIAGNOSIS — Z01.818 ENCOUNTER FOR OTHER PREPROCEDURAL EXAMINATION: ICD-10-CM

## 2021-05-11 DIAGNOSIS — Z98.890 OTHER SPECIFIED POSTPROCEDURAL STATES: Chronic | ICD-10-CM

## 2021-05-11 LAB
ALBUMIN SERPL ELPH-MCNC: 5.1 G/DL — SIGNIFICANT CHANGE UP (ref 3.5–5.2)
ALP SERPL-CCNC: 62 U/L — SIGNIFICANT CHANGE UP (ref 30–115)
ALT FLD-CCNC: 25 U/L — SIGNIFICANT CHANGE UP (ref 0–41)
ANION GAP SERPL CALC-SCNC: 11 MMOL/L — SIGNIFICANT CHANGE UP (ref 7–14)
APPEARANCE UR: CLEAR — SIGNIFICANT CHANGE UP
APTT BLD: 32.3 SEC — SIGNIFICANT CHANGE UP (ref 27–39.2)
AST SERPL-CCNC: 32 U/L — SIGNIFICANT CHANGE UP (ref 0–41)
BASOPHILS # BLD AUTO: 0.04 K/UL — SIGNIFICANT CHANGE UP (ref 0–0.2)
BASOPHILS NFR BLD AUTO: 0.8 % — SIGNIFICANT CHANGE UP (ref 0–1)
BILIRUB SERPL-MCNC: 0.4 MG/DL — SIGNIFICANT CHANGE UP (ref 0.2–1.2)
BILIRUB UR-MCNC: NEGATIVE — SIGNIFICANT CHANGE UP
BUN SERPL-MCNC: 24 MG/DL — HIGH (ref 10–20)
CALCIUM SERPL-MCNC: 10 MG/DL — SIGNIFICANT CHANGE UP (ref 8.5–10.1)
CHLORIDE SERPL-SCNC: 101 MMOL/L — SIGNIFICANT CHANGE UP (ref 98–110)
CO2 SERPL-SCNC: 25 MMOL/L — SIGNIFICANT CHANGE UP (ref 17–32)
COLOR SPEC: SIGNIFICANT CHANGE UP
CREAT SERPL-MCNC: 1.5 MG/DL — SIGNIFICANT CHANGE UP (ref 0.7–1.5)
DIFF PNL FLD: NEGATIVE — SIGNIFICANT CHANGE UP
EOSINOPHIL # BLD AUTO: 0.25 K/UL — SIGNIFICANT CHANGE UP (ref 0–0.7)
EOSINOPHIL NFR BLD AUTO: 4.8 % — SIGNIFICANT CHANGE UP (ref 0–8)
GLUCOSE SERPL-MCNC: 118 MG/DL — HIGH (ref 70–99)
GLUCOSE UR QL: NEGATIVE — SIGNIFICANT CHANGE UP
HCT VFR BLD CALC: 38.6 % — LOW (ref 42–52)
HGB BLD-MCNC: 13.2 G/DL — LOW (ref 14–18)
IMM GRANULOCYTES NFR BLD AUTO: 0.2 % — SIGNIFICANT CHANGE UP (ref 0.1–0.3)
INR BLD: 0.97 RATIO — SIGNIFICANT CHANGE UP (ref 0.65–1.3)
KETONES UR-MCNC: NEGATIVE — SIGNIFICANT CHANGE UP
LEUKOCYTE ESTERASE UR-ACNC: NEGATIVE — SIGNIFICANT CHANGE UP
LYMPHOCYTES # BLD AUTO: 0.6 K/UL — LOW (ref 1.2–3.4)
LYMPHOCYTES # BLD AUTO: 11.6 % — LOW (ref 20.5–51.1)
MCHC RBC-ENTMCNC: 34.2 G/DL — SIGNIFICANT CHANGE UP (ref 32–37)
MCHC RBC-ENTMCNC: 34.2 PG — HIGH (ref 27–31)
MCV RBC AUTO: 100 FL — HIGH (ref 80–94)
MONOCYTES # BLD AUTO: 0.55 K/UL — SIGNIFICANT CHANGE UP (ref 0.1–0.6)
MONOCYTES NFR BLD AUTO: 10.6 % — HIGH (ref 1.7–9.3)
NEUTROPHILS # BLD AUTO: 3.72 K/UL — SIGNIFICANT CHANGE UP (ref 1.4–6.5)
NEUTROPHILS NFR BLD AUTO: 72 % — SIGNIFICANT CHANGE UP (ref 42.2–75.2)
NITRITE UR-MCNC: NEGATIVE — SIGNIFICANT CHANGE UP
NRBC # BLD: 0 /100 WBCS — SIGNIFICANT CHANGE UP (ref 0–0)
PH UR: 6.5 — SIGNIFICANT CHANGE UP (ref 5–8)
PLATELET # BLD AUTO: 244 K/UL — SIGNIFICANT CHANGE UP (ref 130–400)
POTASSIUM SERPL-MCNC: 4.2 MMOL/L — SIGNIFICANT CHANGE UP (ref 3.5–5)
POTASSIUM SERPL-SCNC: 4.2 MMOL/L — SIGNIFICANT CHANGE UP (ref 3.5–5)
PROT SERPL-MCNC: 7.4 G/DL — SIGNIFICANT CHANGE UP (ref 6–8)
PROT UR-MCNC: NEGATIVE — SIGNIFICANT CHANGE UP
PROTHROM AB SERPL-ACNC: 11.2 SEC — SIGNIFICANT CHANGE UP (ref 9.95–12.87)
RBC # BLD: 3.86 M/UL — LOW (ref 4.7–6.1)
RBC # FLD: 11.3 % — LOW (ref 11.5–14.5)
SODIUM SERPL-SCNC: 137 MMOL/L — SIGNIFICANT CHANGE UP (ref 135–146)
SP GR SPEC: 1.02 — SIGNIFICANT CHANGE UP (ref 1.01–1.03)
UROBILINOGEN FLD QL: SIGNIFICANT CHANGE UP
WBC # BLD: 5.17 K/UL — SIGNIFICANT CHANGE UP (ref 4.8–10.8)
WBC # FLD AUTO: 5.17 K/UL — SIGNIFICANT CHANGE UP (ref 4.8–10.8)

## 2021-05-11 PROCEDURE — 93010 ELECTROCARDIOGRAM REPORT: CPT

## 2021-05-11 PROCEDURE — 71046 X-RAY EXAM CHEST 2 VIEWS: CPT | Mod: 26

## 2021-05-11 RX ORDER — IBUPROFEN 200 MG
1 TABLET ORAL
Qty: 0 | Refills: 0 | DISCHARGE

## 2021-05-11 RX ORDER — FENOFIBRATE,MICRONIZED 130 MG
1 CAPSULE ORAL
Qty: 0 | Refills: 0 | DISCHARGE

## 2021-05-11 NOTE — H&P PST ADULT - NSICDXPASTMEDICALHX_GEN_ALL_CORE_FT
PAST MEDICAL HISTORY:  High triglycerides     HTN (hypertension)     Rectal cancer [id number], last chemo 1/2017

## 2021-05-11 NOTE — H&P PST ADULT - HISTORY OF PRESENT ILLNESS
Patient is a 65 year old male presenting to [facility] in preparation for open repair right lower quadrant abdominal wall hernia with mesh    on __ 5/28 under  gen anesthesia by Dr. John.  reports no c/o cp,sob,palpitations,cough or dysuria  1-2 fos without sob    Patient denies any signs or symptoms of COVID 19 and denies contact with known positive individuals.  They have an appointment for repeat COVID testing pre-procedure and acknowledge its time and place.  They were instructed to quarantine pre-procedure, practice exposure control measures, continue to self-monitor and report any concerns to their proceduralist.    Anesthesia Alert  NO--Difficult Airway  NO--History of neck surgery or radiation  NO--Limited ROM of neck  NO--History of Malignant hyperthermia  NO--Personal or family history of Pseudocholinesterase deficiency  NO--Prior Anesthesia Complication  NO--Latex Allergy  NO--Loose teeth  NO--History of Rheumatoid Arthritis  NO--DAVID  NO-- BLEEDING RISK  NO--Other_____    As per patient, this is their complete medical and surgical history, including medications both prescribed or over the counter.  Patient verbalized understanding of instructions and was given the opportunity to ask questions and have them answered.

## 2021-05-21 ENCOUNTER — OUTPATIENT (OUTPATIENT)
Dept: OUTPATIENT SERVICES | Facility: HOSPITAL | Age: 66
LOS: 1 days | Discharge: HOME | End: 2021-05-21
Payer: MEDICARE

## 2021-05-21 DIAGNOSIS — Z98.890 OTHER SPECIFIED POSTPROCEDURAL STATES: Chronic | ICD-10-CM

## 2021-05-21 DIAGNOSIS — F43.9 REACTION TO SEVERE STRESS, UNSPECIFIED: ICD-10-CM

## 2021-05-21 DIAGNOSIS — Z01.810 ENCOUNTER FOR PREPROCEDURAL CARDIOVASCULAR EXAMINATION: ICD-10-CM

## 2021-05-21 DIAGNOSIS — Z01.818 ENCOUNTER FOR OTHER PREPROCEDURAL EXAMINATION: ICD-10-CM

## 2021-05-21 PROCEDURE — 78452 HT MUSCLE IMAGE SPECT MULT: CPT | Mod: 26,MH

## 2021-05-25 ENCOUNTER — OUTPATIENT (OUTPATIENT)
Dept: OUTPATIENT SERVICES | Facility: HOSPITAL | Age: 66
LOS: 1 days | Discharge: HOME | End: 2021-05-25

## 2021-05-25 DIAGNOSIS — Z11.59 ENCOUNTER FOR SCREENING FOR OTHER VIRAL DISEASES: ICD-10-CM

## 2021-05-25 DIAGNOSIS — Z98.890 OTHER SPECIFIED POSTPROCEDURAL STATES: Chronic | ICD-10-CM

## 2021-05-28 ENCOUNTER — OUTPATIENT (OUTPATIENT)
Dept: OUTPATIENT SERVICES | Facility: HOSPITAL | Age: 66
LOS: 1 days | Discharge: HOME | End: 2021-05-28
Payer: MEDICARE

## 2021-05-28 ENCOUNTER — RESULT REVIEW (OUTPATIENT)
Age: 66
End: 2021-05-28

## 2021-05-28 VITALS
SYSTOLIC BLOOD PRESSURE: 138 MMHG | WEIGHT: 154.98 LBS | HEIGHT: 69 IN | DIASTOLIC BLOOD PRESSURE: 67 MMHG | TEMPERATURE: 98 F | OXYGEN SATURATION: 100 % | HEART RATE: 53 BPM | RESPIRATION RATE: 18 BRPM

## 2021-05-28 VITALS — HEART RATE: 47 BPM | SYSTOLIC BLOOD PRESSURE: 116 MMHG | DIASTOLIC BLOOD PRESSURE: 59 MMHG | OXYGEN SATURATION: 100 %

## 2021-05-28 DIAGNOSIS — Z98.890 OTHER SPECIFIED POSTPROCEDURAL STATES: Chronic | ICD-10-CM

## 2021-05-28 PROCEDURE — 88302 TISSUE EXAM BY PATHOLOGIST: CPT | Mod: 26

## 2021-05-28 PROCEDURE — 88304 TISSUE EXAM BY PATHOLOGIST: CPT | Mod: 26

## 2021-05-28 RX ORDER — SODIUM CHLORIDE 9 MG/ML
1000 INJECTION, SOLUTION INTRAVENOUS
Refills: 0 | Status: DISCONTINUED | OUTPATIENT
Start: 2021-05-28 | End: 2021-05-28

## 2021-05-28 RX ORDER — HYDROMORPHONE HYDROCHLORIDE 2 MG/ML
0.5 INJECTION INTRAMUSCULAR; INTRAVENOUS; SUBCUTANEOUS
Refills: 0 | Status: DISCONTINUED | OUTPATIENT
Start: 2021-05-28 | End: 2021-05-28

## 2021-05-28 RX ORDER — ACETAMINOPHEN WITH CODEINE 300MG-30MG
1 TABLET ORAL
Qty: 8 | Refills: 0
Start: 2021-05-28

## 2021-05-28 RX ORDER — MORPHINE SULFATE 50 MG/1
2 CAPSULE, EXTENDED RELEASE ORAL
Refills: 0 | Status: DISCONTINUED | OUTPATIENT
Start: 2021-05-28 | End: 2021-05-28

## 2021-05-28 RX ORDER — ONDANSETRON 8 MG/1
4 TABLET, FILM COATED ORAL ONCE
Refills: 0 | Status: DISCONTINUED | OUTPATIENT
Start: 2021-05-28 | End: 2021-05-28

## 2021-05-28 RX ORDER — OXYCODONE AND ACETAMINOPHEN 5; 325 MG/1; MG/1
1 TABLET ORAL ONCE
Refills: 0 | Status: DISCONTINUED | OUTPATIENT
Start: 2021-05-28 | End: 2021-05-28

## 2021-05-28 RX ADMIN — SODIUM CHLORIDE 75 MILLILITER(S): 9 INJECTION, SOLUTION INTRAVENOUS at 10:30

## 2021-05-28 NOTE — PRE-ANESTHESIA EVALUATION ADULT - NSANTHSUBSTSD_GEN_ALL_CORE
No Pt was seen, evaluated and chart reviewed.  Chart reviewed. Patient is calm now, but remains disorganized, with inappropriate behavior, and episodes of verbal agitation.   Patient is labile, angry  at times, pleasant at other times.  Patient is paranoid today, and feels others are out to get him, and he needs a ride home.  Compliant with meds, and continues to need 1:1 for direction and limits. Also ordered enhanced supervision for nights. Patient is permitted fresh air walk with 2:1 staff bid due to his repeated requests to be able to walk outside.  .  Denies any SI, HI, AH or VH.  No Rx SE or sx TD/EPS are noted or reported.

## 2021-05-28 NOTE — ASU DISCHARGE PLAN (ADULT/PEDIATRIC) - ASU DC SPECIAL INSTRUCTIONSFT
You are being discharged from [facility]. Please schedule a follow up appointment with Dr. John in the next 7-10 days. You can take extra-strength tylenol around the clock for mild pain for the next 3-4 days. You have been prescribed tylenol#3 for moderate to severe pain; please take only if you need it and please take as directed. You may remove your dressing in 48 hours. Please avoid heavy weight lifting for the next 4-6 weeks.  If you have any further questions about your care, please do not hesitate to contact Dr. John's clinic.

## 2021-05-28 NOTE — BRIEF OPERATIVE NOTE - OPERATION/FINDINGS
Repair of right flank incisional hernia 2/2 previous colectomy with mesh, VICKY placed in wound cavity, performed with excision of previous colectomy scar

## 2021-05-28 NOTE — CHART NOTE - NSCHARTNOTEFT_GEN_A_CORE
PACU ANESTHESIA ADMISSION NOTE      Procedure: Incisional hernia repair with mesh      Post op diagnosis:  Incisional hernia        ____  Intubated  TV:______       Rate: ______      FiO2: ______    __x__  Patent Airway    _x___  Full return of protective reflexes    _x___  Full recovery from anesthesia / back to baseline     Vitals:   T: 97.8          R: 15                 BP:   102/53               Sat: 100                  P: 47    x  Mental Status:  ___x_ Awake   x_____ Alert   _____ Drowsy   _____ Sedated    Nausea/Vomiting:  ____ NO  ______Yes,   See Post - Op Orders          Pain Scale (0-10):  _____    Treatment: ____ None    ____ See Post - Op/PCA Orders    Post - Operative Fluids:   ____ Oral   ____ See Post - Op Orders    Plan: Discharge:  x ____Home       _____Floor     _____Critical Care    _____  Other:_____________x____    Comments:

## 2021-05-28 NOTE — ASU DISCHARGE PLAN (ADULT/PEDIATRIC) - CARE PROVIDER_API CALL
[facility]  SURGERY  [address] [place] [id number]  Phone: [phone]  Fax: [phone]  Follow Up Time:

## 2021-06-01 LAB — SURGICAL PATHOLOGY STUDY: SIGNIFICANT CHANGE UP

## 2021-06-09 DIAGNOSIS — Z88.0 ALLERGY STATUS TO PENICILLIN: ICD-10-CM

## 2021-06-09 DIAGNOSIS — I10 ESSENTIAL (PRIMARY) HYPERTENSION: ICD-10-CM

## 2021-06-09 DIAGNOSIS — K43.2 INCISIONAL HERNIA WITHOUT OBSTRUCTION OR GANGRENE: ICD-10-CM

## 2021-06-09 DIAGNOSIS — Z87.891 PERSONAL HISTORY OF NICOTINE DEPENDENCE: ICD-10-CM

## 2021-06-09 DIAGNOSIS — Z85.048 PERSONAL HISTORY OF OTHER MALIGNANT NEOPLASM OF RECTUM, RECTOSIGMOID JUNCTION, AND ANUS: ICD-10-CM

## 2021-06-09 DIAGNOSIS — L90.5 SCAR CONDITIONS AND FIBROSIS OF SKIN: ICD-10-CM

## 2021-06-14 ENCOUNTER — LABORATORY RESULT (OUTPATIENT)
Age: 66
End: 2021-06-14

## 2021-06-14 ENCOUNTER — APPOINTMENT (OUTPATIENT)
Dept: HEMATOLOGY ONCOLOGY | Facility: CLINIC | Age: 66
End: 2021-06-14
Payer: MEDICARE

## 2021-06-14 VITALS
TEMPERATURE: 97.4 F | WEIGHT: 179 LBS | SYSTOLIC BLOOD PRESSURE: 110 MMHG | BODY MASS INDEX: 26.51 KG/M2 | DIASTOLIC BLOOD PRESSURE: 70 MMHG | HEIGHT: 69 IN | HEART RATE: 76 BPM

## 2021-06-14 PROCEDURE — 99213 OFFICE O/P EST LOW 20 MIN: CPT

## 2021-06-15 LAB
ALBUMIN SERPL ELPH-MCNC: 5 G/DL
ALP BLD-CCNC: 59 U/L
ALT SERPL-CCNC: 25 U/L
ANION GAP SERPL CALC-SCNC: 10 MMOL/L
AST SERPL-CCNC: 32 U/L
BILIRUB SERPL-MCNC: 0.5 MG/DL
BUN SERPL-MCNC: 19 MG/DL
CALCIUM SERPL-MCNC: 9.8 MG/DL
CANCER AG19-9 SERPL-ACNC: 6 U/ML
CEA SERPL-MCNC: 1.5 NG/ML
CHLORIDE SERPL-SCNC: 97 MMOL/L
CO2 SERPL-SCNC: 26 MMOL/L
CREAT SERPL-MCNC: 1.3 MG/DL
FOLATE SERPL-MCNC: 12.7 NG/ML
GLUCOSE SERPL-MCNC: 114 MG/DL
HCT VFR BLD CALC: 36.7 %
HGB BLD-MCNC: 12.4 G/DL
MCHC RBC-ENTMCNC: 33.8 G/DL
MCHC RBC-ENTMCNC: 34.3 PG
MCV RBC AUTO: 101.7 FL
PLATELET # BLD AUTO: 244 K/UL
PMV BLD: 9.1 FL
POTASSIUM SERPL-SCNC: 4.7 MMOL/L
PROT SERPL-MCNC: 6.9 G/DL
RBC # BLD: 3.61 M/UL
RBC # FLD: 11.7 %
SODIUM SERPL-SCNC: 133 MMOL/L
VIT B12 SERPL-MCNC: 319 PG/ML
WBC # FLD AUTO: 3.1 K/UL

## 2021-06-18 RX ORDER — ACETAMINOPHEN AND CODEINE PHOSPHATE 300; 15 MG/1; MG/1
300-15 TABLET ORAL
Qty: 8 | Refills: 0 | Status: ACTIVE | COMMUNITY
Start: 2021-05-28

## 2021-06-18 RX ORDER — CLINDAMYCIN HYDROCHLORIDE 150 MG/1
150 CAPSULE ORAL
Qty: 14 | Refills: 0 | Status: ACTIVE | COMMUNITY
Start: 2021-05-05

## 2021-06-18 NOTE — ASSESSMENT
[FreeTextEntry1] : 1 Stage III rectal cancer status post chemoradiation and LAR/ileostomy and 4 cycles of adjuvant chemotherapy with XELOX. His STELLA nodule kept growing and underwent wedge resection On  October 1st 2019 which was consistent with oligometastatic disease. No chemotherapy is warranted at this time.\par - Has CT C/A/P  1221  was LIZZ \par - CBC, CMP CEA today and Ca 19-9\par - next colonoscopy  is due  this year to  follow with GI\par -will check CT  C/A/P now and if LIZZ RTC in 6 months  \par \par 2. Decrease white blood cell count as well as macrocytosis and mild  anemia   is stable \par - he had a bone marrow that did not demonstrate any pathology possibly has ICUS or due to ETOH use \par - I suspect this is due this Alcohol use since he claims 3 glasses of wine daily  as and increase MCV may bee seen with Fatty liver as well which he has\par - I recommended only  one glass of wine a day\par -will recheck b12 and foalte again\par \par \par \par Will call with results of CT scans  [Curative] : Goals of care discussed with patient: Curative

## 2021-06-18 NOTE — HISTORY OF PRESENT ILLNESS
[Disease: _____________________] : Disease: [unfilled] [T: ___] : T[unfilled] [N: ___] : N[unfilled] [M: ___] : M[unfilled] [AJCC Stage: ____] : AJCC Stage: [unfilled] [de-identified] : Patient is a 60 year old male who was diagnosed with rectal carcinoma after symptoms of hematochezia, tenesmus. He then underwent colonoscopy with Dr. Dory Carrillo on 04/22/2016, that showed an obstructing circumferential mass at 10 cm from anal verge consistent with adenocarcinoma. Patient was given chemoradiation with xeloda and he finsihed it then underwent laparoscopic LAR with TME and diverting loop ileostomy on 8/30/2016. Patient recovered from surgery except for hospitalization with dehydration from diarrhea postoperatively that resolved. He was started on adjuvant Xelox on 10/4/2016 and completed 4 cycles in December of 2016. [de-identified] : adenocarcinoma [de-identified] :   adenocarcinoma, low grade, well differentiated.  Tumor invades through the muscularis propria into subserosal adipose tissue   \par  [de-identified] : 5/9/17\par He presents to establish care. He is to have his ostomy reversed but was noted to have low WBC on blood work. See bellprincess. Due to his cytopenia  he had a bone marrow biopsy in 2/2017 that showed hypocellular marrrow. Cytogenetics and FISH was negative.\par   He had a CT abd/pelvis on 3/2017 that showed Post rectal anastomosis and right lower quadrant ileostomy without CT evidence of abdominopelvic residual disease.Unchanged 6 mm ground-glass right lower lobe pulmonary nodule. \par \par He had a colonoscopy on April 10th by Dr. Handley and reports it was normal.  He was to have his ostomy reversed by Timmy Arriaga but he was not cleared by his  PMD Dr. Earline Acosta due to low WBC. Repeat CBC showed improvement.\par \par 7/17/17\par He is doing well. He has mild grade 1 neuropathy in feet that he barely feels. His bowel movements are improving.  No weight loss, no bleeding. Ostomy was reversed. .\par \par 8/2/17\par I brought him back for follow up do tof all in his HgB. He states he feels well except for chronic neuropathy. Stools are brown and urine is yellow.\par \par 10/11/17\par He is here for follow. He feels well.  He was started on Oral b12 tabs since his B12 level was low. He has no complaints.\par \par 1/10/18\par He is here for follow up.  He has a CT C/A/P on 10/20/17 that showed stable pulmonary nodules and No evidence of metastatic disease within the abdomen or pelvis.  CEA was 2. He feels well. Was started back on his ACEi for HTN. Normal bowel movements. No bleeding. He stopped his b12 \par \par 7/25/18\par He is here for follow up. He had an US liver on 5/2018 that showed fatty liver. Colonosocpy on 5/2018 by Dr. Handley  shwoed only  mild radiation proctitis next colonosocy in 3 years May 2021. Blood work quest in May 2018  CMP normal, Cholesterol was a bit high. WBC 3.6, HgB 13.9, , Lymphs low 842, Plts 199\par He states he feels fine. HE admits to drinking 3 glasses of wine every night. \par \par 10/17/18\par He is here for follow up. Blodo work from quest 8/8/18 BNP normal,  LFT normal,  Wbc 3.5 normal diff, hgb 13.3, PSA 1.5 .7\par \par 12/3/18\par Patient is here for a follow-up visit.  He is feeling well with no complaints.  Patient denies fever, chills, nausea, vomiting. CEA is stable.\par CT C/A/P 10/30/18 shows New solid left upper lobe pulmonary nodule measuring 5 mm. He has no complaints  \par \par 2/13/19\par Patient is here for a follow-up visit.  He is feeling well with no complaints.  Patient denies fever, chills, nausea, vomiting.  Most recent CEA from 12/6/18 is stable at 1.9ng/mL.  He has restarted Vitamin B12.  \par CT Chest 1/3/19 IMPRESSION: 1. 5 mm left upper lobe solid pulmonary nodule (4/116), stable since 10/30/2018 increased in size since 10/20/2017. Continued follow-up is recommended. 2. Additional bilateral subcentimeter nodules are stable since 5/11/2016. 3. No new suspicious mass or nodule. \par Labs 1/15/19 Normal CMP, TSH 9.68, Ferritin 393, WBC 3.4, Hgb 13.5, , , Folate 8\par \par 6/12/19\par He is here for follow-up of Rectal cancer.   He is doing well. he has no complaints. Still drinks 2-3 glasses of wine every night.\par \par 10/21/19\par He is here for follow up.  Since last visit he had CT C/A/ P on 8/26/19. It showed growth in his STELLA pulmonary nodules. He than had a PET/CT on   9/4/19 7 mm left upper lobe solid nodule is FDG avid, SUV max 4.5, consistent with biologic tumor activity.\par On October 1 st he underwent wedge resection by Dr. Benjamin  path was consistent with Metastatic adenocarcinoma with colorectal primary    NGS no mutations, MMR intact, \par \par He feels well. No complaints. \par \par 12/2/19\par Patient is here for a follow-up visit.  He is feeling well with no new complaints.  Patient denies fever, chills, nausea, vomiting, new pain or bleeding.  He has plans for blood work in early January and will go for repeat scans afterward.  This appointment was scheduled months in advance, due to timing of treatment, he was instructed to come in January; however this visit was never canceled.  \par \par 3/3/20\par He is doing well. He has no issues. He had CT C/A/P on 1/23/20: Post partial left upper lobe resection. \par No significant change in a 7 mm right lower lobe groundglass nodule.IMPRESSION: \par No findings of metastatic disease in the abdomen or pelvis.\par \par \par 6/2/20\par He is here for follow up. He has had constipation and with straining he started having pain in his right inguinal hernia and is pending to see Dr. John of surgery, Otherwise he is doing well.  \par \par 11/30/20\par He is here for follow up He had hernia repair in July with path showing only a hernia sac.  CEA ahs been normal.  CT C/A/P in June 2020 \par IMPRESSION:\par Since 1/23/2020,\par 1.  No significant interval change.\par 2.  Stable 7 mm right lower lobe groundglass nodule.\par 3.  Stable post partial left upper lobe resection.\par \par IMPRESSION:\par \par 1.  No findings of metastatic disease in the abdomen or pelvis.\par \par He feels well and has no complaints. Exam limited  due to COVID\par \par 6/14/2021\par He is here for follow up. He feels well and has no complaints.

## 2021-06-18 NOTE — PHYSICAL EXAM
[Fully active, able to carry on all pre-disease performance without restriction] : Status 0 - Fully active, able to carry on all pre-disease performance without restriction [Ulcers] : no ulcers [Mucositis] : no mucositis [Thrush] : no thrush [Vesicles] : no vesicles [Normal] : affect appropriate

## 2021-06-18 NOTE — RESULTS/DATA
[FreeTextEntry1] : [facility],   \par [address]  [place] [address]        [phone] \par \par Patient  Name:  ASIM STREET  :  1955 \par Patient  ID:  [id number] \par Account:  [id number] \par Patient  Location:  [facility] \par \par Accession:  [id number] \par Procedure:  CT  ABDOMEN  PELVIS  W  IV  AND  PO  CONTRAST  [id number] \par Date  of  Exam:  2017  01:52  PM \par Attending  Physician:  ALEX CONSTANTINO \par Requesting  Physician:  ALEX CONSTANTINO MD \par \par \par CLINICAL  STATEMENT:  Rectal  cancer  post  chemotherapy,  radiation  therapy  and  surgery.    \par   \par TECHNIQUE:  Contiguous  axial  CT  images  were  obtained  from  the  lung  bases  to  the  pubic  symphysis  following  administration  of  100cc  Optiray  320  intravenous  contrast.    Oral  contrast  was  administered.    Reformatted  images  in  the  coronal  and  sagittal  planes  were  acquired. \par   \par COMPARISON:    CT  abdomen  pelvis  dated  2016  and  MRI  of  pelvis  dated  2016 \par   \par FINDINGS: \par   \par LUNG  BASES:  There  is  an  unchanged  6  mm  groundglass  right  lower  lobe  pulmonary  nodule  (series  4,  image  42). \par LIVER:  Hepatic  steatosis. \par SPLEEN:  Unremarkable. \par PANCREAS:  Unremarkable. \par GALLBLADDER  AND  BILIARY  TREE:  There  is  cholelithiasis.  No  biliary  ductal  dilatation. \par ADRENALS:  Unremarkable. \par KIDNEYS:  Symmetric  enhancement  without  hydronephrosis. \par LYMPH  NODES:  There  are  no  enlarged  abdominal  or  pelvic  lymph  nodes. \par VASCULATURE:  The  abdominal  aorta  is  normal  in  caliber  and  demonstrates  atherosclerotic  changes. \par BOWEL:  Patient  is  post  rectal  anastomosis.  There  is  a  right  lower  quadrant  loop  ileostomy.  No  bowel  obstruction. \par PERITONEUM/RETROPERITONEUM/MESENTERY:  Presacral  edema.  No  ascites  or  pneumoperitoneum. \par PELVIC  VISCERA:  Unremarkable. \par BONES  AND  SOFT  TISSUES:  No  acute  osseous  abnormality  is  noted.  L4-L5  spondylolysis.  Grade  1  anterolisthesis  of  L5  on  S1.  \par   \par IMPRESSION: \par   \par Post  rectal  anastomosis  and  right  lower  quadrant  ileostomy  without  CT  evidence  of  abdominopelvic  residual  disease. \par   \par Unchanged  6  mm  groundglass  right  lower  lobe  pulmonary  nodule. \par   \par \par \par \par Original  final  report  dictated  and  signed  by  Dr. Eric Cristobal  on  2017  03:50  PM

## 2021-06-18 NOTE — REVIEW OF SYSTEMS
[Fever] : no fever [Fatigue] : no fatigue [Recent Change In Weight] : ~T no recent weight change [Chest Pain] : no chest pain [Shortness Of Breath] : no shortness of breath [Abdominal Pain] : no abdominal pain [Vomiting] : no vomiting [Diarrhea] : no diarrhea [Dysuria] : no dysuria [Joint Pain] : no joint pain [Skin Rash] : no skin rash [Negative] : Neurological

## 2021-06-29 LAB — METHYLMALONATE SERPL-SCNC: 446 NMOL/L

## 2021-07-21 ENCOUNTER — OUTPATIENT (OUTPATIENT)
Dept: OUTPATIENT SERVICES | Facility: HOSPITAL | Age: 66
LOS: 1 days | Discharge: HOME | End: 2021-07-21

## 2021-07-21 ENCOUNTER — APPOINTMENT (OUTPATIENT)
Dept: HEMATOLOGY ONCOLOGY | Facility: CLINIC | Age: 66
End: 2021-07-21

## 2021-07-21 DIAGNOSIS — C20 MALIGNANT NEOPLASM OF RECTUM: ICD-10-CM

## 2021-07-21 DIAGNOSIS — Z98.890 OTHER SPECIFIED POSTPROCEDURAL STATES: Chronic | ICD-10-CM

## 2021-07-21 DIAGNOSIS — C78.00 SECONDARY MALIGNANT NEOPLASM OF UNSPECIFIED LUNG: ICD-10-CM

## 2021-07-21 DIAGNOSIS — D53.9 NUTRITIONAL ANEMIA, UNSPECIFIED: ICD-10-CM

## 2021-07-22 LAB
ANION GAP SERPL CALC-SCNC: 11 MMOL/L
BUN SERPL-MCNC: 19 MG/DL
CALCIUM SERPL-MCNC: 9.6 MG/DL
CHLORIDE SERPL-SCNC: 100 MMOL/L
CO2 SERPL-SCNC: 26 MMOL/L
CREAT SERPL-MCNC: 1.3 MG/DL
GLUCOSE SERPL-MCNC: 116 MG/DL
POTASSIUM SERPL-SCNC: 4.4 MMOL/L
SODIUM SERPL-SCNC: 137 MMOL/L

## 2021-08-05 ENCOUNTER — RESULT REVIEW (OUTPATIENT)
Age: 66
End: 2021-08-05

## 2021-08-05 ENCOUNTER — OUTPATIENT (OUTPATIENT)
Dept: OUTPATIENT SERVICES | Facility: HOSPITAL | Age: 66
LOS: 1 days | Discharge: HOME | End: 2021-08-05
Payer: MEDICARE

## 2021-08-05 DIAGNOSIS — Z98.890 OTHER SPECIFIED POSTPROCEDURAL STATES: Chronic | ICD-10-CM

## 2021-08-05 DIAGNOSIS — C20 MALIGNANT NEOPLASM OF RECTUM: ICD-10-CM

## 2021-08-05 PROCEDURE — 74177 CT ABD & PELVIS W/CONTRAST: CPT | Mod: 26,MH

## 2021-08-05 PROCEDURE — 71260 CT THORAX DX C+: CPT | Mod: 26,MH

## 2022-01-21 ENCOUNTER — OUTPATIENT (OUTPATIENT)
Dept: OUTPATIENT SERVICES | Facility: HOSPITAL | Age: 67
LOS: 1 days | Discharge: HOME | End: 2022-01-21

## 2022-01-21 ENCOUNTER — LABORATORY RESULT (OUTPATIENT)
Age: 67
End: 2022-01-21

## 2022-01-21 ENCOUNTER — APPOINTMENT (OUTPATIENT)
Dept: HEMATOLOGY ONCOLOGY | Facility: CLINIC | Age: 67
End: 2022-01-21
Payer: MEDICARE

## 2022-01-21 DIAGNOSIS — Z98.890 OTHER SPECIFIED POSTPROCEDURAL STATES: Chronic | ICD-10-CM

## 2022-01-21 LAB
ALBUMIN SERPL ELPH-MCNC: 4.9 G/DL
ALP BLD-CCNC: 54 U/L
ALT SERPL-CCNC: 26 U/L
ANION GAP SERPL CALC-SCNC: 15 MMOL/L
AST SERPL-CCNC: 27 U/L
BILIRUB SERPL-MCNC: 0.5 MG/DL
BUN SERPL-MCNC: 20 MG/DL
CALCIUM SERPL-MCNC: 10.1 MG/DL
CHLORIDE SERPL-SCNC: 99 MMOL/L
CO2 SERPL-SCNC: 23 MMOL/L
CREAT SERPL-MCNC: 1.2 MG/DL
GLUCOSE SERPL-MCNC: 106 MG/DL
HCT VFR BLD CALC: 38.4 %
HGB BLD-MCNC: 13.4 G/DL
MCHC RBC-ENTMCNC: 34.9 G/DL
MCHC RBC-ENTMCNC: 35.1 PG
MCV RBC AUTO: 100.5 FL
PLATELET # BLD AUTO: 248 K/UL
PMV BLD: 9 FL
POTASSIUM SERPL-SCNC: 4.4 MMOL/L
PROT SERPL-MCNC: 7.2 G/DL
RBC # BLD: 3.82 M/UL
RBC # FLD: 11.5 %
SODIUM SERPL-SCNC: 137 MMOL/L
WBC # FLD AUTO: 3.88 K/UL

## 2022-01-21 PROCEDURE — 99214 OFFICE O/P EST MOD 30 MIN: CPT

## 2022-01-21 NOTE — HISTORY OF PRESENT ILLNESS
[Disease: _____________________] : Disease: [unfilled] [T: ___] : T[unfilled] [N: ___] : N[unfilled] [M: ___] : M[unfilled] [AJCC Stage: ____] : AJCC Stage: [unfilled] [de-identified] : Patient is a 60 year old male who was diagnosed with rectal carcinoma after symptoms of hematochezia, tenesmus. He then underwent colonoscopy with Dr. Dory Carrillo on 04/22/2016, that showed an obstructing circumferential mass at 10 cm from anal verge consistent with adenocarcinoma. Patient was given chemoradiation with xeloda and he finsihed it then underwent laparoscopic LAR with TME and diverting loop ileostomy on 8/30/2016. Patient recovered from surgery except for hospitalization with dehydration from diarrhea postoperatively that resolved. He was started on adjuvant Xelox on 10/4/2016 and completed 4 cycles in December of 2016. [de-identified] : adenocarcinoma [de-identified] :   adenocarcinoma, low grade, well differentiated.  Tumor invades through the muscularis propria into subserosal adipose tissue   \par  [de-identified] : 5/9/17\par He presents to establish care. He is to have his ostomy reversed but was noted to have low WBC on blood work. See bellprincess. Due to his cytopenia  he had a bone marrow biopsy in 2/2017 that showed hypocellular marrrow. Cytogenetics and FISH was negative.\par   He had a CT abd/pelvis on 3/2017 that showed Post rectal anastomosis and right lower quadrant ileostomy without CT evidence of abdominopelvic residual disease.Unchanged 6 mm ground-glass right lower lobe pulmonary nodule. \par \par He had a colonoscopy on April 10th by Dr. Handley and reports it was normal.  He was to have his ostomy reversed by Timmy Arriaga but he was not cleared by his  PMD Dr. Earline Acosta due to low WBC. Repeat CBC showed improvement.\par \par 7/17/17\par He is doing well. He has mild grade 1 neuropathy in feet that he barely feels. His bowel movements are improving.  No weight loss, no bleeding. Ostomy was reversed. .\par \par 8/2/17\par I brought him back for follow up do tof all in his HgB. He states he feels well except for chronic neuropathy. Stools are brown and urine is yellow.\par \par 10/11/17\par He is here for follow. He feels well.  He was started on Oral b12 tabs since his B12 level was low. He has no complaints.\par \par 1/10/18\par He is here for follow up.  He has a CT C/A/P on 10/20/17 that showed stable pulmonary nodules and No evidence of metastatic disease within the abdomen or pelvis.  CEA was 2. He feels well. Was started back on his ACEi for HTN. Normal bowel movements. No bleeding. He stopped his b12 \par \par 7/25/18\par He is here for follow up. He had an US liver on 5/2018 that showed fatty liver. Colonosocpy on 5/2018 by Dr. Handley  shwoed only  mild radiation proctitis next colonosocy in 3 years May 2021. Blood work quest in May 2018  CMP normal, Cholesterol was a bit high. WBC 3.6, HgB 13.9, , Lymphs low 842, Plts 199\par He states he feels fine. HE admits to drinking 3 glasses of wine every night. \par \par 10/17/18\par He is here for follow up. Blodo work from quest 8/8/18 BNP normal,  LFT normal,  Wbc 3.5 normal diff, hgb 13.3, PSA 1.5 .7\par \par 12/3/18\par Patient is here for a follow-up visit.  He is feeling well with no complaints.  Patient denies fever, chills, nausea, vomiting. CEA is stable.\par CT C/A/P 10/30/18 shows New solid left upper lobe pulmonary nodule measuring 5 mm. He has no complaints  \par \par 2/13/19\par Patient is here for a follow-up visit.  He is feeling well with no complaints.  Patient denies fever, chills, nausea, vomiting.  Most recent CEA from 12/6/18 is stable at 1.9ng/mL.  He has restarted Vitamin B12.  \par CT Chest 1/3/19 IMPRESSION: 1. 5 mm left upper lobe solid pulmonary nodule (4/116), stable since 10/30/2018 increased in size since 10/20/2017. Continued follow-up is recommended. 2. Additional bilateral subcentimeter nodules are stable since 5/11/2016. 3. No new suspicious mass or nodule. \par Labs 1/15/19 Normal CMP, TSH 9.68, Ferritin 393, WBC 3.4, Hgb 13.5, , , Folate 8\par \par 6/12/19\par He is here for follow-up of Rectal cancer.   He is doing well. he has no complaints. Still drinks 2-3 glasses of wine every night.\par \par 10/21/19\par He is here for follow up.  Since last visit he had CT C/A/ P on 8/26/19. It showed growth in his STELLA pulmonary nodules. He than had a PET/CT on   9/4/19 7 mm left upper lobe solid nodule is FDG avid, SUV max 4.5, consistent with biologic tumor activity.\par On October 1 st he underwent wedge resection by Dr. Benjamin  path was consistent with Metastatic adenocarcinoma with colorectal primary    NGS no mutations, MMR intact, \par \par He feels well. No complaints. \par \par 12/2/19\par Patient is here for a follow-up visit.  He is feeling well with no new complaints.  Patient denies fever, chills, nausea, vomiting, new pain or bleeding.  He has plans for blood work in early January and will go for repeat scans afterward.  This appointment was scheduled months in advance, due to timing of treatment, he was instructed to come in January; however this visit was never canceled.  \par \par 3/3/20\par He is doing well. He has no issues. He had CT C/A/P on 1/23/20: Post partial left upper lobe resection. \par No significant change in a 7 mm right lower lobe groundglass nodule.IMPRESSION: \par No findings of metastatic disease in the abdomen or pelvis.\par \par \par 6/2/20\par He is here for follow up. He has had constipation and with straining he started having pain in his right inguinal hernia and is pending to see Dr. John of surgery, Otherwise he is doing well.  \par \par 11/30/20\par He is here for follow up He had hernia repair in July with path showing only a hernia sac.  CEA ahs been normal.  CT C/A/P in June 2020 \par IMPRESSION:\par Since 1/23/2020,\par 1.  No significant interval change.\par 2.  Stable 7 mm right lower lobe groundglass nodule.\par 3.  Stable post partial left upper lobe resection.\par \par IMPRESSION:\par \par 1.  No findings of metastatic disease in the abdomen or pelvis.\par \par He feels well and has no complaints. Exam limited  due to COVID\par \par 6/14/2021\par He is here for follow up. He feels well and has no complaints. \par \par 1/21/22\par He is here for follow up. he had CT C/A/P in 8/2021 was  LIZZ. He feels well. He is now on folic acid  CBC from today is stable.

## 2022-01-21 NOTE — RESULTS/DATA
[FreeTextEntry1] : [facility]  [facility],   \par [address]  [place] [address]        [phone] \par \par Patient  Name:  ASIM STREET  :  1955 \par Patient  ID:  [id number] \par Account:  [id number] \par Patient  Location:  [facility] \par \par Accession:  [id number] \par Procedure:  CT  ABDOMEN  PELVIS  W  IV  AND  PO  CONTRAST  [id number] \par Date  of  Exam:  2017  01:52  PM \par Attending  Physician:  ALEX CONSTANTINO \par Requesting  Physician:  ALEX CONSTANTINO MD \par \par \par CLINICAL  STATEMENT:  Rectal  cancer  post  chemotherapy,  radiation  therapy  and  surgery.    \par   \par TECHNIQUE:  Contiguous  axial  CT  images  were  obtained  from  the  lung  bases  to  the  pubic  symphysis  following  administration  of  100cc  Optiray  320  intravenous  contrast.    Oral  contrast  was  administered.    Reformatted  images  in  the  coronal  and  sagittal  planes  were  acquired. \par   \par COMPARISON:    CT  abdomen  pelvis  dated  2016  and  MRI  of  pelvis  dated  2016 \par   \par FINDINGS: \par   \par LUNG  BASES:  There  is  an  unchanged  6  mm  groundglass  right  lower  lobe  pulmonary  nodule  (series  4,  image  42). \par LIVER:  Hepatic  steatosis. \par SPLEEN:  Unremarkable. \par PANCREAS:  Unremarkable. \par GALLBLADDER  AND  BILIARY  TREE:  There  is  cholelithiasis.  No  biliary  ductal  dilatation. \par ADRENALS:  Unremarkable. \par KIDNEYS:  Symmetric  enhancement  without  hydronephrosis. \par LYMPH  NODES:  There  are  no  enlarged  abdominal  or  pelvic  lymph  nodes. \par VASCULATURE:  The  abdominal  aorta  is  normal  in  caliber  and  demonstrates  atherosclerotic  changes. \par BOWEL:  Patient  is  post  rectal  anastomosis.  There  is  a  right  lower  quadrant  loop  ileostomy.  No  bowel  obstruction. \par PERITONEUM/RETROPERITONEUM/MESENTERY:  Presacral  edema.  No  ascites  or  pneumoperitoneum. \par PELVIC  VISCERA:  Unremarkable. \par BONES  AND  SOFT  TISSUES:  No  acute  osseous  abnormality  is  noted.  L4-L5  spondylolysis.  Grade  1  anterolisthesis  of  L5  on  S1.  \par   \par IMPRESSION: \par   \par Post  rectal  anastomosis  and  right  lower  quadrant  ileostomy  without  CT  evidence  of  abdominopelvic  residual  disease. \par   \par Unchanged  6  mm  groundglass  right  lower  lobe  pulmonary  nodule. \par   \par \par \par \par Original  final  report  dictated  and  signed  by  Dr. Eric Cristobal  on  2017  03:50  PM

## 2022-01-21 NOTE — ASSESSMENT
[Curative] : Goals of care discussed with patient: Curative [FreeTextEntry1] : 1 Stage III rectal cancer status post chemoradiation and LAR/ileostomy and 4 cycles of adjuvant chemotherapy with XELOX   Competed in December of 2016. . His STELLA nodule kept growing and underwent wedge resection On  October 1st 2019 which was consistent with oligometastatic disease. No chemotherapy is warranted at this time. But he is stage IV\par - Has CT C/A/P  8/2021   was LIZZ \par - CBC, CMP CEA today \par - next colonoscopy  is due and he will call up our GI group \par -will check CT  C/A/P  for up to 5 years from 10/2019. His resection was now   over 2  years away as per NCCN after 2nd year we can check CT C/A/P every 6-12 months for up to 5  years and we deiced to repeat CT in July/August 2022 if CEA goes up will rescan sooner\par \par 2. Decrease white blood cell count as well as macrocytosis and mild  anemia   is stable \par - he had a bone marrow that did not demonstrate any pathology possibly has ICUS or due to ETOH use \par - I suspect this is due this Alcohol use since he claims 3 glasses of wine daily  as and increase MCV may bee seen with Fatty liver as well which he has\par - I recommended only  one glass of wine a day\par -CBC is stable and he is on oral b12 and folic acid\par \par RTC in July 2022 will order CTs than\par \par \par \par

## 2022-01-24 DIAGNOSIS — D53.9 NUTRITIONAL ANEMIA, UNSPECIFIED: ICD-10-CM

## 2022-01-24 DIAGNOSIS — C20 MALIGNANT NEOPLASM OF RECTUM: ICD-10-CM

## 2022-01-24 DIAGNOSIS — C78.00 SECONDARY MALIGNANT NEOPLASM OF UNSPECIFIED LUNG: ICD-10-CM

## 2022-01-24 LAB — CEA SERPL-MCNC: 1 NG/ML

## 2022-07-22 ENCOUNTER — APPOINTMENT (OUTPATIENT)
Dept: HEMATOLOGY ONCOLOGY | Facility: CLINIC | Age: 67
End: 2022-07-22

## 2022-07-22 ENCOUNTER — LABORATORY RESULT (OUTPATIENT)
Age: 67
End: 2022-07-22

## 2022-07-22 ENCOUNTER — OUTPATIENT (OUTPATIENT)
Dept: OUTPATIENT SERVICES | Facility: HOSPITAL | Age: 67
LOS: 1 days | Discharge: HOME | End: 2022-07-22

## 2022-07-22 VITALS
TEMPERATURE: 98.2 F | SYSTOLIC BLOOD PRESSURE: 144 MMHG | HEART RATE: 64 BPM | WEIGHT: 150 LBS | DIASTOLIC BLOOD PRESSURE: 90 MMHG | HEIGHT: 69 IN | BODY MASS INDEX: 22.22 KG/M2 | RESPIRATION RATE: 14 BRPM

## 2022-07-22 DIAGNOSIS — Z98.890 OTHER SPECIFIED POSTPROCEDURAL STATES: Chronic | ICD-10-CM

## 2022-07-22 DIAGNOSIS — E53.8 DEFICIENCY OF OTHER SPECIFIED B GROUP VITAMINS: ICD-10-CM

## 2022-07-22 LAB
HCT VFR BLD CALC: 35.4 %
HGB BLD-MCNC: 12.4 G/DL
MCHC RBC-ENTMCNC: 35 G/DL
MCHC RBC-ENTMCNC: 35.5 PG
MCV RBC AUTO: 101.4 FL
PLATELET # BLD AUTO: 227 K/UL
PMV BLD: 8.9 FL
RBC # BLD: 3.49 M/UL
RBC # FLD: 11.8 %
WBC # FLD AUTO: 4.35 K/UL

## 2022-07-22 PROCEDURE — 99214 OFFICE O/P EST MOD 30 MIN: CPT

## 2022-07-22 NOTE — RESULTS/DATA
[FreeTextEntry1] : [facility]  [facility],   \par [address]  [place] [address]        [phone] \par \par Patient  Name:  ASIM STREET  :  1955 \par Patient  ID:  [id number] \par Account:  [id number] \par Patient  Location:  [facility] \par \par Accession:  [id number] \par Procedure:  CT  ABDOMEN  PELVIS  W  IV  AND  PO  CONTRAST  [id number] \par Date  of  Exam:  2017  01:52  PM \par Attending  Physician:  ALEX CONSTANTINO \par Requesting  Physician:  ALEX CONSTANTINO MD \par \par \par CLINICAL  STATEMENT:  Rectal  cancer  post  chemotherapy,  radiation  therapy  and  surgery.    \par   \par TECHNIQUE:  Contiguous  axial  CT  images  were  obtained  from  the  lung  bases  to  the  pubic  symphysis  following  administration  of  100cc  Optiray  320  intravenous  contrast.    Oral  contrast  was  administered.    Reformatted  images  in  the  coronal  and  sagittal  planes  were  acquired. \par   \par COMPARISON:    CT  abdomen  pelvis  dated  2016  and  MRI  of  pelvis  dated  2016 \par   \par FINDINGS: \par   \par LUNG  BASES:  There  is  an  unchanged  6  mm  groundglass  right  lower  lobe  pulmonary  nodule  (series  4,  image  42). \par LIVER:  Hepatic  steatosis. \par SPLEEN:  Unremarkable. \par PANCREAS:  Unremarkable. \par GALLBLADDER  AND  BILIARY  TREE:  There  is  cholelithiasis.  No  biliary  ductal  dilatation. \par ADRENALS:  Unremarkable. \par KIDNEYS:  Symmetric  enhancement  without  hydronephrosis. \par LYMPH  NODES:  There  are  no  enlarged  abdominal  or  pelvic  lymph  nodes. \par VASCULATURE:  The  abdominal  aorta  is  normal  in  caliber  and  demonstrates  atherosclerotic  changes. \par BOWEL:  Patient  is  post  rectal  anastomosis.  There  is  a  right  lower  quadrant  loop  ileostomy.  No  bowel  obstruction. \par PERITONEUM/RETROPERITONEUM/MESENTERY:  Presacral  edema.  No  ascites  or  pneumoperitoneum. \par PELVIC  VISCERA:  Unremarkable. \par BONES  AND  SOFT  TISSUES:  No  acute  osseous  abnormality  is  noted.  L4-L5  spondylolysis.  Grade  1  anterolisthesis  of  L5  on  S1.  \par   \par IMPRESSION: \par   \par Post  rectal  anastomosis  and  right  lower  quadrant  ileostomy  without  CT  evidence  of  abdominopelvic  residual  disease. \par   \par Unchanged  6  mm  groundglass  right  lower  lobe  pulmonary  nodule. \par   \par \par \par \par Original  final  report  dictated  and  signed  by  Dr. Eric Cristobal  on  2017  03:50  PM

## 2022-07-22 NOTE — REVIEW OF SYSTEMS
[Fever] : no fever [Fatigue] : no fatigue [Recent Change In Weight] : ~T no recent weight change [Chest Pain] : no chest pain [Shortness Of Breath] : no shortness of breath [Abdominal Pain] : no abdominal pain [Vomiting] : no vomiting [Diarrhea] : no diarrhea [Dysuria] : no dysuria [Joint Pain] : no joint pain [Skin Rash] : no skin rash [Negative] : Heme/Lymph

## 2022-07-22 NOTE — ASSESSMENT
[FreeTextEntry1] : 1 Stage III rectal cancer status post chemoradiation and LAR/ileostomy and 4 cycles of adjuvant chemotherapy with XELOX   Competed in December of 2016. . His STELLA nodule kept growing and underwent wedge resection On  October 1st 2019 which was consistent with oligometastatic disease. No chemotherapy is warranted at this time. But he is stage IV\par - Has CT C/A/P  8/2021   was LIZZ \par -had Colonoscopy in 5/2022 next 5/2025\par \par Plan  \par -  CMP CEA today \par --CT  C/A/P  for up to 5 years from 10/2019. His resection was now   over 2  years away as per NCCN after 2nd year we can check CT C/A/P every 6-12 months for up to 5  years and we deiced to repeat CT in July/August 2022  and it was ordered now if LIZZ will check again in one year\par \par 2. Decrease white blood cell count as well as macrocytosis and mild  anemia   is stable \par - he had a bone marrow that did not demonstrate any pathology possibly has ICUS or due to ETOH use \par - I suspect this is due this Alcohol use since he claims 3 glasses of wine daily  as and increase MCV may bee seen with Fatty liver as well which he has\par - I recommended only  one glass of wine a day\par -CBC is stable and he is on oral b12 and folic acid will recheck B12 and MMA today \par \par RTC in  6 months if LIZZ will call with resuls \par \par \par \par  [Curative] : Goals of care discussed with patient: Curative

## 2022-07-22 NOTE — HISTORY OF PRESENT ILLNESS
[Disease: _____________________] : Disease: [unfilled] [T: ___] : T[unfilled] [N: ___] : N[unfilled] [M: ___] : M[unfilled] [AJCC Stage: ____] : AJCC Stage: [unfilled] [de-identified] : Patient is a 60 year old male who was diagnosed with rectal carcinoma after symptoms of hematochezia, tenesmus. He then underwent colonoscopy with Dr. Dory Carrillo on 04/22/2016, that showed an obstructing circumferential mass at 10 cm from anal verge consistent with adenocarcinoma. Patient was given chemoradiation with xeloda and he finsihed it then underwent laparoscopic LAR with TME and diverting loop ileostomy on 8/30/2016. Patient recovered from surgery except for hospitalization with dehydration from diarrhea postoperatively that resolved. He was started on adjuvant Xelox on 10/4/2016 and completed 4 cycles in December of 2016. [de-identified] : adenocarcinoma [de-identified] :   adenocarcinoma, low grade, well differentiated.  Tumor invades through the muscularis propria into subserosal adipose tissue   \par  [de-identified] : 5/9/17\par He presents to establish care. He is to have his ostomy reversed but was noted to have low WBC on blood work. See bellprincess. Due to his cytopenia  he had a bone marrow biopsy in 2/2017 that showed hypocellular marrrow. Cytogenetics and FISH was negative.\par   He had a CT abd/pelvis on 3/2017 that showed Post rectal anastomosis and right lower quadrant ileostomy without CT evidence of abdominopelvic residual disease.Unchanged 6 mm ground-glass right lower lobe pulmonary nodule. \par \par He had a colonoscopy on April 10th by Dr. Handley and reports it was normal.  He was to have his ostomy reversed by Timmy Arriaga but he was not cleared by his  PMD Dr. Earline Acosta due to low WBC. Repeat CBC showed improvement.\par \par 7/17/17\par He is doing well. He has mild grade 1 neuropathy in feet that he barely feels. His bowel movements are improving.  No weight loss, no bleeding. Ostomy was reversed. .\par \par 8/2/17\par I brought him back for follow up do tof all in his HgB. He states he feels well except for chronic neuropathy. Stools are brown and urine is yellow.\par \par 10/11/17\par He is here for follow. He feels well.  He was started on Oral b12 tabs since his B12 level was low. He has no complaints.\par \par 1/10/18\par He is here for follow up.  He has a CT C/A/P on 10/20/17 that showed stable pulmonary nodules and No evidence of metastatic disease within the abdomen or pelvis.  CEA was 2. He feels well. Was started back on his ACEi for HTN. Normal bowel movements. No bleeding. He stopped his b12 \par \par 7/25/18\par He is here for follow up. He had an US liver on 5/2018 that showed fatty liver. Colonosocpy on 5/2018 by Dr. Handley  shwoed only  mild radiation proctitis next colonosocy in 3 years May 2021. Blood work [facility] in May 2018  CMP normal, Cholesterol was a bit high. WBC 3.6, HgB 13.9, , Lymphs low 842, Plts 199\par He states he feels fine. HE admits to drinking 3 glasses of wine every night. \par \par 10/17/18\par He is here for follow up. Blodo work from [facility] 8/8/18 BNP normal,  LFT normal,  Wbc 3.5 normal diff, hgb 13.3, PSA 1.5 .7\par \par 12/3/18\par Patient is here for a follow-up visit.  He is feeling well with no complaints.  Patient denies fever, chills, nausea, vomiting. CEA is stable.\par CT C/A/P 10/30/18 shows New solid left upper lobe pulmonary nodule measuring 5 mm. He has no complaints  \par \par 2/13/19\par Patient is here for a follow-up visit.  He is feeling well with no complaints.  Patient denies fever, chills, nausea, vomiting.  Most recent CEA from 12/6/18 is stable at 1.9ng/mL.  He has restarted Vitamin B12.  \par CT Chest 1/3/19 IMPRESSION: 1. 5 mm left upper lobe solid pulmonary nodule (4/116), stable since 10/30/2018 increased in size since 10/20/2017. Continued follow-up is recommended. 2. Additional bilateral subcentimeter nodules are stable since 5/11/2016. 3. No new suspicious mass or nodule. \par Labs 1/15/19 Normal CMP, TSH 9.68, Ferritin 393, WBC 3.4, Hgb 13.5, , , Folate 8\par \par 6/12/19\par He is here for follow-up of Rectal cancer.   He is doing well. he has no complaints. Still drinks 2-3 glasses of wine every night.\par \par 10/21/19\par He is here for follow up.  Since last visit he had CT C/A/ P on 8/26/19. It showed growth in his STELLA pulmonary nodules. He than had a PET/CT on   9/4/19 7 mm left upper lobe solid nodule is FDG avid, SUV max 4.5, consistent with biologic tumor activity.\par On October 1 st he underwent wedge resection by Dr. Benjamin  path was consistent with Metastatic adenocarcinoma with colorectal primary    NGS no mutations, MMR intact, \par \par He feels well. No complaints. \par \par 12/2/19\par Patient is here for a follow-up visit.  He is feeling well with no new complaints.  Patient denies fever, chills, nausea, vomiting, new pain or bleeding.  He has plans for blood work in early January and will go for repeat scans afterward.  This appointment was scheduled months in advance, due to timing of treatment, he was instructed to come in January; however this visit was never canceled.  \par \par 3/3/20\par He is doing well. He has no issues. He had CT C/A/P on 1/23/20: Post partial left upper lobe resection. \par No significant change in a 7 mm right lower lobe groundglass nodule.IMPRESSION: \par No findings of metastatic disease in the abdomen or pelvis.\par \par \par 6/2/20\par He is here for follow up. He has had constipation and with straining he started having pain in his right inguinal hernia and is pending to see Dr. John of surgery, Otherwise he is doing well.  \par \par 11/30/20\par He is here for follow up He had hernia repair in July with path showing only a hernia sac.  CEA ahs been normal.  CT C/A/P in June 2020 \par IMPRESSION:\par Since 1/23/2020,\par 1.  No significant interval change.\par 2.  Stable 7 mm right lower lobe groundglass nodule.\par 3.  Stable post partial left upper lobe resection.\par \par IMPRESSION:\par \par 1.  No findings of metastatic disease in the abdomen or pelvis.\par \par He feels well and has no complaints. Exam limited  due to COVID\par \par 6/14/2021\par He is here for follow up. He feels well and has no complaints. \par \par 1/21/22\par He is here for follow up. he had CT C/A/P in 8/2021 was  LIZZ. He feels well. He is now on folic acid  CBC from today is stable. \par \par \par 7/22/22\par He is here for follow up. He had a colonoscopy on  5/9/22 that was normal. Next on in 2025. CBC from today showed stable macrocitic anemia  and decrease WBC with lymphopenia. He takes b12.

## 2022-07-25 ENCOUNTER — EMERGENCY (EMERGENCY)
Facility: HOSPITAL | Age: 67
LOS: 0 days | Discharge: HOME | End: 2022-07-26
Attending: EMERGENCY MEDICINE | Admitting: EMERGENCY MEDICINE

## 2022-07-25 VITALS
SYSTOLIC BLOOD PRESSURE: 186 MMHG | TEMPERATURE: 99 F | HEIGHT: 69 IN | OXYGEN SATURATION: 95 % | HEART RATE: 73 BPM | WEIGHT: 152.12 LBS | DIASTOLIC BLOOD PRESSURE: 84 MMHG | RESPIRATION RATE: 18 BRPM

## 2022-07-25 DIAGNOSIS — Z98.890 OTHER SPECIFIED POSTPROCEDURAL STATES: Chronic | ICD-10-CM

## 2022-07-25 DIAGNOSIS — E53.8 DEFICIENCY OF OTHER SPECIFIED B GROUP VITAMINS: ICD-10-CM

## 2022-07-25 DIAGNOSIS — Z85.528 PERSONAL HISTORY OF OTHER MALIGNANT NEOPLASM OF KIDNEY: ICD-10-CM

## 2022-07-25 DIAGNOSIS — C20 MALIGNANT NEOPLASM OF RECTUM: ICD-10-CM

## 2022-07-25 DIAGNOSIS — R53.1 WEAKNESS: ICD-10-CM

## 2022-07-25 DIAGNOSIS — Z80.1 FAMILY HISTORY OF MALIGNANT NEOPLASM OF TRACHEA, BRONCHUS AND LUNG: ICD-10-CM

## 2022-07-25 DIAGNOSIS — Z20.822 CONTACT WITH AND (SUSPECTED) EXPOSURE TO COVID-19: ICD-10-CM

## 2022-07-25 DIAGNOSIS — M62.81 MUSCLE WEAKNESS (GENERALIZED): ICD-10-CM

## 2022-07-25 DIAGNOSIS — I10 ESSENTIAL (PRIMARY) HYPERTENSION: ICD-10-CM

## 2022-07-25 DIAGNOSIS — Z88.0 ALLERGY STATUS TO PENICILLIN: ICD-10-CM

## 2022-07-25 DIAGNOSIS — D53.9 NUTRITIONAL ANEMIA, UNSPECIFIED: ICD-10-CM

## 2022-07-25 DIAGNOSIS — E78.1 PURE HYPERGLYCERIDEMIA: ICD-10-CM

## 2022-07-25 DIAGNOSIS — C78.00 SECONDARY MALIGNANT NEOPLASM OF UNSPECIFIED LUNG: ICD-10-CM

## 2022-07-25 LAB
ALBUMIN SERPL ELPH-MCNC: 4.6 G/DL — SIGNIFICANT CHANGE UP (ref 3.5–5.2)
ALBUMIN SERPL ELPH-MCNC: 4.7 G/DL
ALP BLD-CCNC: 58 U/L
ALP SERPL-CCNC: 57 U/L — SIGNIFICANT CHANGE UP (ref 30–115)
ALT FLD-CCNC: 15 U/L — SIGNIFICANT CHANGE UP (ref 0–41)
ALT SERPL-CCNC: 15 U/L
ANION GAP SERPL CALC-SCNC: 11 MMOL/L
ANION GAP SERPL CALC-SCNC: 13 MMOL/L — SIGNIFICANT CHANGE UP (ref 7–14)
APTT BLD: 28.5 SEC — SIGNIFICANT CHANGE UP (ref 27–39.2)
AST SERPL-CCNC: 22 U/L
AST SERPL-CCNC: 23 U/L — SIGNIFICANT CHANGE UP (ref 0–41)
BASOPHILS # BLD AUTO: 0.03 K/UL — SIGNIFICANT CHANGE UP (ref 0–0.2)
BASOPHILS NFR BLD AUTO: 0.6 % — SIGNIFICANT CHANGE UP (ref 0–1)
BILIRUB SERPL-MCNC: 0.4 MG/DL
BILIRUB SERPL-MCNC: 0.4 MG/DL — SIGNIFICANT CHANGE UP (ref 0.2–1.2)
BUN SERPL-MCNC: 18 MG/DL
BUN SERPL-MCNC: 18 MG/DL — SIGNIFICANT CHANGE UP (ref 10–20)
CALCIUM SERPL-MCNC: 9.6 MG/DL — SIGNIFICANT CHANGE UP (ref 8.5–10.1)
CALCIUM SERPL-MCNC: 9.7 MG/DL
CEA SERPL-MCNC: 1.4 NG/ML
CHLORIDE SERPL-SCNC: 100 MMOL/L — SIGNIFICANT CHANGE UP (ref 98–110)
CHLORIDE SERPL-SCNC: 99 MMOL/L
CO2 SERPL-SCNC: 22 MMOL/L — SIGNIFICANT CHANGE UP (ref 17–32)
CO2 SERPL-SCNC: 26 MMOL/L
CREAT SERPL-MCNC: 1.4 MG/DL
CREAT SERPL-MCNC: 1.4 MG/DL — SIGNIFICANT CHANGE UP (ref 0.7–1.5)
EGFR: 55 ML/MIN/1.73M2
EGFR: 55 ML/MIN/1.73M2 — LOW
EOSINOPHIL # BLD AUTO: 0.1 K/UL — SIGNIFICANT CHANGE UP (ref 0–0.7)
EOSINOPHIL NFR BLD AUTO: 1.9 % — SIGNIFICANT CHANGE UP (ref 0–8)
GLUCOSE SERPL-MCNC: 109 MG/DL
GLUCOSE SERPL-MCNC: 115 MG/DL — HIGH (ref 70–99)
HCT VFR BLD CALC: 34.8 % — LOW (ref 42–52)
HGB BLD-MCNC: 12.2 G/DL — LOW (ref 14–18)
IMM GRANULOCYTES NFR BLD AUTO: 0.4 % — HIGH (ref 0.1–0.3)
INR BLD: 0.94 RATIO — SIGNIFICANT CHANGE UP (ref 0.65–1.3)
LYMPHOCYTES # BLD AUTO: 0.58 K/UL — LOW (ref 1.2–3.4)
LYMPHOCYTES # BLD AUTO: 10.7 % — LOW (ref 20.5–51.1)
MCHC RBC-ENTMCNC: 34.8 PG — HIGH (ref 27–31)
MCHC RBC-ENTMCNC: 35.1 G/DL — SIGNIFICANT CHANGE UP (ref 32–37)
MCV RBC AUTO: 99.1 FL — HIGH (ref 80–94)
MONOCYTES # BLD AUTO: 0.58 K/UL — SIGNIFICANT CHANGE UP (ref 0.1–0.6)
MONOCYTES NFR BLD AUTO: 10.7 % — HIGH (ref 1.7–9.3)
NEUTROPHILS # BLD AUTO: 4.09 K/UL — SIGNIFICANT CHANGE UP (ref 1.4–6.5)
NEUTROPHILS NFR BLD AUTO: 75.7 % — HIGH (ref 42.2–75.2)
NRBC # BLD: 0 /100 WBCS — SIGNIFICANT CHANGE UP (ref 0–0)
PLATELET # BLD AUTO: 236 K/UL — SIGNIFICANT CHANGE UP (ref 130–400)
POTASSIUM SERPL-MCNC: 3.9 MMOL/L — SIGNIFICANT CHANGE UP (ref 3.5–5)
POTASSIUM SERPL-SCNC: 3.9 MMOL/L — SIGNIFICANT CHANGE UP (ref 3.5–5)
POTASSIUM SERPL-SCNC: 4.4 MMOL/L
PROT SERPL-MCNC: 6.8 G/DL
PROT SERPL-MCNC: 7.1 G/DL — SIGNIFICANT CHANGE UP (ref 6–8)
PROTHROM AB SERPL-ACNC: 10.8 SEC — SIGNIFICANT CHANGE UP (ref 9.95–12.87)
RBC # BLD: 3.51 M/UL — LOW (ref 4.7–6.1)
RBC # FLD: 11.6 % — SIGNIFICANT CHANGE UP (ref 11.5–14.5)
SARS-COV-2 RNA SPEC QL NAA+PROBE: SIGNIFICANT CHANGE UP
SODIUM SERPL-SCNC: 135 MMOL/L — SIGNIFICANT CHANGE UP (ref 135–146)
SODIUM SERPL-SCNC: 136 MMOL/L
TROPONIN T SERPL-MCNC: <0.01 NG/ML — SIGNIFICANT CHANGE UP
VIT B12 SERPL-MCNC: 527 PG/ML
WBC # BLD: 5.4 K/UL — SIGNIFICANT CHANGE UP (ref 4.8–10.8)
WBC # FLD AUTO: 5.4 K/UL — SIGNIFICANT CHANGE UP (ref 4.8–10.8)

## 2022-07-25 PROCEDURE — 93010 ELECTROCARDIOGRAM REPORT: CPT

## 2022-07-25 PROCEDURE — 70450 CT HEAD/BRAIN W/O DYE: CPT | Mod: 26,MA,59

## 2022-07-25 PROCEDURE — 71045 X-RAY EXAM CHEST 1 VIEW: CPT | Mod: 26

## 2022-07-25 PROCEDURE — 99220: CPT | Mod: FS

## 2022-07-25 PROCEDURE — 0042T: CPT | Mod: MA

## 2022-07-25 PROCEDURE — 70498 CT ANGIOGRAPHY NECK: CPT | Mod: 26,MA

## 2022-07-25 PROCEDURE — 70496 CT ANGIOGRAPHY HEAD: CPT | Mod: 26,MA

## 2022-07-25 NOTE — ED PROVIDER NOTE - PHYSICAL EXAMINATION
CONSTITUTIONAL: in no acute distress, afebrile  SKIN: Warm, dry  HEAD: Normocephalic; atraumatic  EYES: No conjunctival injection. EOMI. PERRLA  ENT: No nasal discharge; oropharynx nonerythematous; airway clear  NECK: Supple; non tender  CARD:  Regular rate and rhythm  RESP: CTAB  ABD: Soft NTND; No guarding or rebound tenderness  EXT: No clubbing or cyanosis.  No edema; +R wrist drop, mild paresthesia to dorsum wrist, decreased strength with R dorsiflexion, neurovascularly in tact; full strength in elbow and shoulder; CN 2-12 grossly intact. no dysarthria, no ataxia, normal gait, nml FNF, no facial droop  NEURO: A&O x3, grossly unremarkable  *Chaperone was used during the encounter

## 2022-07-25 NOTE — ED PROVIDER NOTE - NS ED ROS FT
Review of Systems:  CONSTITUTIONAL: No fever, No diaphoresis, No generalized weakness  SKIN: No rash  HEMATOLOGIC: No abnormal bleeding or bruising  EYES: No eye pain, No blurred vision  ENT: No change in hearing, No sore throat, No neck pain, No rhinorrhea, No ear pain  RESPIRATORY: No shortness of breath, No cough  CARDIAC: No chest pain, No palpitations  GI: No abdominal pain, No nausea, No vomiting, No diarrhea, No constipation, No bright red blood per rectum or melena. No flank pain  MUSCULOSKELETAL: No joint paint, No swelling, No back pain  NEUROLOGIC: + numbness, + focal weakness, No headache, No dizziness  All other systems negative, unless specified in HPI

## 2022-07-25 NOTE — ED PROVIDER NOTE - CLINICAL SUMMARY MEDICAL DECISION MAKING FREE TEXT BOX
Right upper extremity numbness and weakness.  Wrist drop.  Stroke code.  Neuro eval.  Observational placement

## 2022-07-25 NOTE — CONSULT NOTE ADULT - SUBJECTIVE AND OBJECTIVE BOX
Neurology Consult    Patient is a 67y old  Male who presents with a chief complaint of right arm numbness, wrist drop.     HPI:68 y/o male with PMHx HLD, HTN, Rectal Cancer (last chemo 2017) who presents with right arm weakness/numbness.  Patient woke up with weakness at 6 AM. Patient went to sleep at 12AM at baseline.  Went to his PMD in the morning and then referred to ER.  Patient does recall that he did fall asleep in his office on the chair. Admits to drinking glass of wine every night.       PAST MEDICAL & SURGICAL HISTORY:  Rectal cancer  [id number], last chemo 1/2017      HTN (hypertension)      High triglycerides      History of rectal surgery  RESECTION AND RECONNECTION      H/O arthroscopic knee surgery      History of lung surgery  left lung 10/2019 partial lobe resection          FAMILY HISTORY:  FH: lung cancer        Social History: (-) x 3    Allergies    penicillins (Other (Moderate))    Intolerances        MEDICATIONS  (STANDING):    MEDICATIONS  (PRN):    Vital Signs Last 24 Hrs  T(C): 37.1 (25 Jul 2022 17:33), Max: 37.1 (25 Jul 2022 17:33)  T(F): 98.8 (25 Jul 2022 17:33), Max: 98.8 (25 Jul 2022 17:33)  HR: 73 (25 Jul 2022 17:33) (73 - 73)  BP: 186/84 (25 Jul 2022 17:33) (186/84 - 186/84)  BP(mean): --  RR: 18 (25 Jul 2022 17:33) (18 - 18)  SpO2: 95% (25 Jul 2022 17:33) (95% - 95%)    Parameters below as of 25 Jul 2022 17:33  Patient On (Oxygen Delivery Method): room air        Examination:  General:  Appearance is consistent with chronologic age.  No abnormal facies.  Gross skin survey within normal limits.    Cognitive/Language:  The patient is oriented to person, place, time and date.  Recent and remote memory intact.  Fund of knowledge is intact and normal.  Language with normal repetition, comprehension and naming.  Nondysarthric.    Eyes: intact  VFF.  EOMI w/o nystagmus,  Face:  Facial sensation normal , no facial asymmetry.    Ears/Nose/Throat:  Hearing grossly intact b/l..  Tongue midline.   Motor examination:     LUE 5/5 shoulder shrug, proximal and distal muscles  RUE 5/5 shoulder shrug and proximal muscles, right hand in a flexed position, cannot extend, thumb cannot abduct  RUE  strength 3/5, LUE 5/5  Sensory examination:   Intact to light touch all 4 extremities, but subjectively feels paresthesias on the radial nerve distribution on the anterior forearm  Cerebellum:   FTN inact.       Labs:   CBC Full  -  ( 25 Jul 2022 18:10 )  WBC Count : 5.40 K/uL  RBC Count : 3.51 M/uL  Hemoglobin : 12.2 g/dL  Hematocrit : 34.8 %  Platelet Count - Automated : 236 K/uL  Mean Cell Volume : 99.1 fL  Mean Cell Hemoglobin : 34.8 pg  Mean Cell Hemoglobin Concentration : 35.1 g/dL  Auto Neutrophil # : 4.09 K/uL  Auto Lymphocyte # : 0.58 K/uL  Auto Monocyte # : 0.58 K/uL  Auto Eosinophil # : 0.10 K/uL  Auto Basophil # : 0.03 K/uL  Auto Neutrophil % : 75.7 %  Auto Lymphocyte % : 10.7 %  Auto Monocyte % : 10.7 %  Auto Eosinophil % : 1.9 %  Auto Basophil % : 0.6 %    07-25    135  |  100  |  18  ----------------------------<  115<H>  3.9   |  22  |  1.4    Ca    9.6      25 Jul 2022 18:10    TPro  7.1  /  Alb  4.6  /  TBili  0.4  /  DBili  x   /  AST  23  /  ALT  15  /  AlkPhos  57  07-25    LIVER FUNCTIONS - ( 25 Jul 2022 18:10 )  Alb: 4.6 g/dL / Pro: 7.1 g/dL / ALK PHOS: 57 U/L / ALT: 15 U/L / AST: 23 U/L / GGT: x           PT/INR - ( 25 Jul 2022 18:10 )   PT: 10.80 sec;   INR: 0.94 ratio         PTT - ( 25 Jul 2022 18:10 )  PTT:28.5 sec        [facility]     [facility] @ 20:27  < from: CT Brain Stroke Protocol (07.25.22 @ 17:50) >    IMPRESSION:    No acute intracranial pathology.    Moderate chronic microvascular ischemic changes.    --- End of Report ---    < end of copied text >  < from: CT Angio Brain Stroke Protocol  w/ IV Cont (07.25.22 @ 18:22) >    IMPRESSION:    Negative CTA of the head and neck    No evidence of major vascular stenosis, occlusion, or aneurysm.    No perfusion abnormalities. No areas of core infarct or ischemic penumbra.    --- End of Report ---        < end of copied text >

## 2022-07-25 NOTE — ED PROVIDER NOTE - OBJECTIVE STATEMENT
66 y/o male with PMHx HLD, HTN, Rectal Cancer who presents with right upper extremity weakness.  Patient woke up with weakness at 6:30 AM.  Went to his PMD in the morning and then referred to ER.  Patient does recall that he did fall asleep in his office on the chair possibly impinging on axilla.  Patient denies any headache, fevers, chills, chest pain, shortness of breath, abdominal pain, nausea, vomiting.

## 2022-07-25 NOTE — ED ADULT NURSE NOTE - NSICDXPASTMEDICALHX_GEN_ALL_CORE_FT
PAST MEDICAL HISTORY:  High triglycerides     HTN (hypertension)     Rectal cancer [id number], last chemo 1/2017

## 2022-07-25 NOTE — ED PROVIDER NOTE - PROGRESS NOTE DETAILS
AH - CTs unremarkable; likely radial nerve palsy; Neuro recommending OBS for Dr Eckert to evaluate tomorrow if further workup; plan explained to pt, agrees; signed out to MIGUELITO Reis

## 2022-07-25 NOTE — ED ADULT NURSE NOTE - NSIMPLEMENTINTERV_GEN_ALL_ED
Implemented All Fall with Harm Risk Interventions:  [place] to call system. Call bell, personal items and telephone within reach. Instruct patient to call for assistance. Room bathroom lighting operational. Non-slip footwear when patient is off stretcher. Physically safe environment: no spills, clutter or unnecessary equipment. Stretcher in lowest position, wheels locked, appropriate side rails in place. Provide visual cue, wrist band, yellow gown, etc. Monitor gait and stability. Monitor for mental status changes and reorient to person, place, and time. Review medications for side effects contributing to fall risk. Reinforce activity limits and safety measures with patient and family. Provide visual clues: red socks.

## 2022-07-25 NOTE — CONSULT NOTE ADULT - ASSESSMENT
Assessment::68 y/o male with PMHx HLD, HTN, Rectal Cancer (last chemo 2017) who presents with right arm weakness/numbness.  Patient woke up with weakness at 6 AM. Patient went to sleep at 12AM at baseline.  Went to his PMD in the morning and then referred to ER.  Patient does recall that he did fall asleep in his office on the chair. Admits to drinking glass of wine every night. Stroke code on arrival , patient is NIH-0, subjective subjectively feels paresthesias on the radial nerve distribution. On exam, patient has a wrist drop in the right hand in a flexed position, cannot extend, thumb cannot abduct.    #Most likely due to radial nerve palsy    Suggestions:  Patient can go Obs  OT to evaluate    **Pending attending note to follow with recs** Discussed with Dr. Eckert

## 2022-07-25 NOTE — CONSULT NOTE ADULT - NS ATTEND AMEND GEN_ALL_CORE FT
Patient examined for RUE weakness.  He has mild chemotherapy related polyneuropathy and fell asleep on a chair with arm over the side.  When he awoke in am had right wrist drop.  On examination has radial distribution sensory and motor loss.  I discussed diagnosis and treatment with patient and his wife and advised to sleep with pillow under right arm and avoid sleeping in chair.  I gave him my clinic number to call if fails to improve over the next 1-2 months. Patient examined for RUE weakness.  He has mild chemotherapy related polyneuropathy and fell asleep on a chair with arm over the side.  When he awoke in am had right wrist drop.  On examination has radial distribution sensory and motor loss.  I discussed diagnosis and treatment with patient and his wife and advised to sleep with pillow under right arm and avoid sleeping in chair.  I gave him my clinic number to call if fails to improve over the next 1-2 months.  Outpatient occupational therapy is recommended.

## 2022-07-25 NOTE — ED ADULT NURSE NOTE - OBJECTIVE STATEMENT
pt sent in by PMD, has right wrist drop. started feeling weakness to right arm since this morning at 630am. Stroke code was initiated in triage.

## 2022-07-25 NOTE — ED PROVIDER NOTE - ATTENDING CONTRIBUTION TO CARE
67-year-old male history of hypertension, hyperlipidemia, now presents with waking up with a right upper extremity weakness and numbness.  Last known well last night.  Went to his doctor.  Referred to emergency department.    On exam, vss, nontoxic, well appearing, pink conj, anicteric, MMM, neck supple, CTAB, RRR, equal radial pulses bilat, abd soft/nt/nd, no cva tend. no calves tend, no edema, right wrist drop, mild paresthesia to the distal right upper extremity, normal gait no rashes.    Stroke code on arrival, labs and imaging

## 2022-07-26 VITALS
DIASTOLIC BLOOD PRESSURE: 75 MMHG | HEART RATE: 50 BPM | TEMPERATURE: 99 F | RESPIRATION RATE: 18 BRPM | SYSTOLIC BLOOD PRESSURE: 134 MMHG | OXYGEN SATURATION: 100 %

## 2022-07-26 PROCEDURE — 99283 EMERGENCY DEPT VISIT LOW MDM: CPT

## 2022-07-26 PROCEDURE — 99217: CPT

## 2022-07-26 NOTE — ED ADULT NURSE REASSESSMENT NOTE - NS ED NURSE REASSESS COMMENT FT1
Patient assessed, VSS. Patient has no complaints or needs at this time. Updated on plan of care and verbalized understanding.

## 2022-07-26 NOTE — ED CDU PROVIDER DISPOSITION NOTE - PATIENT PORTAL LINK FT
You can access the FollowMyHealth Patient Portal offered by [facility] by registering at the following website: [email or web address]. By joining [employer]’s FollowMyHealth portal, you will also be able to view your health information using other applications (apps) compatible with our system.

## 2022-07-26 NOTE — ED CDU PROVIDER DISPOSITION NOTE - CARE PROVIDERS DIRECT ADDRESSES
[email or web address],DirectAddress_Unknown

## 2022-07-26 NOTE — ED CDU PROVIDER INITIAL DAY NOTE - NS ED ATTENDING STATEMENT MOD
This was a shared visit with the LEOPOLDO. I reviewed and verified the documentation and independently performed the documented:

## 2022-07-26 NOTE — OCCUPATIONAL THERAPY INITIAL EVALUATION ADULT - RANGE OF MOTION EXAMINATION, UPPER EXTREMITY
RUE proximally WFL, wrist trace extension digits trace extension, flexion WFL/Left UE Active ROM was WNL (within normal limits)

## 2022-07-26 NOTE — ED CDU PROVIDER DISPOSITION NOTE - CARE PROVIDER_API CALL
Dion Eckert)  Neurology  [address] [place] [address], [address]  [place] [address]  Phone: [phone]  Fax: [phone]  Follow Up Time: 1-3 Days    your pmd in 1-3 days,   Phone: (   )    -  Fax: (   )    -  Follow Up Time:

## 2022-07-26 NOTE — ED CDU PROVIDER DISPOSITION NOTE - PROVIDER TOKENS
PROVIDER:[TOKEN:[[id number]],FOLLOWUP:[1-3 Days]],FREE:[LAST:[your pmd in 1-3 days],PHONE:[(   )    -],FAX:[(   )    -]]

## 2022-07-26 NOTE — ED CDU PROVIDER DISPOSITION NOTE - NSFOLLOWUPINSTRUCTIONS_ED_ALL_ED_FT
Radial Nerve Palsy       Radial nerve palsy is the loss of function of the radial nerve in your arm or hand. The radial nerve extends from your shoulder, around the back of your upper arm, and down the outside of your lower arm. An injury to this nerve causes certain muscles and tendons in your arm and wrist not to work properly, which leads to a condition known as wrist drop. This means that you cannot extend your wrist. If you are standing with your arm stretched straight out in front of you, your wrist will bend and your hand will drop down toward the floor.    An injury to the radial nerve may also result in lost feeling (sensation) in parts of your arm.      What are the causes?    Common causes of this condition include:  •A break (fracture) of your upper or lower arm bone.      •Complications from surgery.      •Improper use of crutches. Crutches that are too long can put pressure on the radial nerve where it passes through the armpit (crutch palsy).      •Keeping your arm in a position that places prolonged pressure on the radial nerve, such as by sleeping with arms over the back of a chair (Saturday night palsy).      •Performing repetitive activities that involve rotation of your lower arm or movement of your wrist (repetitive use injury).        What increases the risk?    You are more likely to develop this condition if you:  •Play contact sports.      •Have a condition in which your body's immune system attacks your joints (rheumatoid arthritis).      •Have diabetes.      •Have an underactive thyroid (hypothyroidism).        What are the signs or symptoms?    Symptoms of this condition include:  •Inability to extend your wrist.      •Difficulty straightening your elbow and wrist.      •Numbness or tingling in the back of your arm, forearm, or hand.      •Inability to pinch.      •Muscle of the injured arm looking smaller.        How is this diagnosed?    This condition is diagnosed with a history of the injury and a physical exam. To confirm the diagnosis, you may also have tests, including:  •Ultrasound. This test show if the nerve has an injury.      •Nerve conduction studies. These tests show if the radial nerve is sending electrical signals well.      •X-rays. These may be done if your health care provider suspects that you have an injury to the bones in your arm.      •MRI. This may be used to determine the cause of your radial nerve palsy or to rule out other causes of your symptoms.        How is this treated?                  Treatment for this condition depends on the cause. It may involve:•Medicines.  •NSAIDs may be used to control pain.      •A steroid injection may be used to decrease swelling around the nerve.      •Physical and occupational therapy. This may help you:  •Regain strength in your hand and wrist.       •Maintain range of motion of your wrist and elbow.        •Splinting. Your health care provider may make one or more splints for you to wear during the day or at night to help with motion and positioning of your wrist.      •Removing pressure on the radial nerve. This is done if the condition is caused by pressure on the nerve. Using this treatment may allow the nerve to go back to normal within a few weeks or a few months.    •Surgery. Depending on the cause of your radial nerve palsy, surgery may be needed to:  •Remove pressure on the nerve (entrapment).      •Repair broken bones.      •Relocate (transfer) tendons in your lower arm to help you regain strength and mobility of your wrist.      •Transfer a nerve to the injury site to restore nerve function.          Follow these instructions at home:    If you have a splint or brace     •Wear the splint or brace as told by your health care provider. Remove it only as told by your health care provider.      • Loosen the splint or brace if your fingers tingle, become numb, or turn cold and blue.      •Keep the splint or brace clean.    •If the splint or brace is not waterproof:  •Do not let it get wet.      •Cover it with a watertight covering when you take a bath or a shower.        Managing pain, stiffness, and swelling   •If directed, put ice on the injured area:   •If you have a removable splint or brace, remove it as told by your health care provider.      •Put ice in a plastic bag.       •Place a towel between your skin and the bag.       •Leave the ice on for 20 minutes, 2–3 times a day.        •Move your fingers often to avoid stiffness and to lessen swelling.      •Raise (elevate) the injured area above the level of your heart while you are sitting or lying down.      General instructions     •Follow your health care provider's instructions about how to protect your hand and wrist.      •Protect your hand from extreme temperature injuries, such as burns and frostbite.      •Exercise your hand, wrist, and arm on a regular basis, as directed by your health care provider or therapist.      •Keep all follow-up visits as told by your health care provider. This is important.        Contact a health care provider if you:    •Have a sudden increase in pain.      •Develop new numbness or new loss of sensation in your hand.        Get help right away if you:    •Have a sudden change in your ability to move your arm, wrist, or hand.      •Notice your fingers become bluish in color or feel cold to the touch.        Summary    •Radial nerve palsy is the loss of function of the radial nerve in your arm or hand. That nerve extends from your shoulder, around the back of your upper arm, and down the outside of your lower arm.      •An injury to the radial nerve causes certain muscles and tendons in your arm and wrist not to work properly. This makes you unable to extend your wrist (a condition known as wrist drop). You may also lose feeling (sensation) in parts of your arm.      •Treatment for this condition depends on the cause. It may include removing pressure on the radial nerve, physical and occupational therapy, splinting, medicines, or surgery.      This information is not intended to replace advice given to you by your health care provider. Make sure you discuss any questions you have with your health care provider.

## 2022-07-26 NOTE — ED CDU PROVIDER INITIAL DAY NOTE - NSICDXPASTMEDICALHX_GEN_ALL_CORE_FT
PAST MEDICAL HISTORY:  High triglycerides     HTN (hypertension)     Rectal cancer [id number], last chemo 1/2017

## 2022-07-26 NOTE — ED CDU PROVIDER SUBSEQUENT DAY NOTE - PROGRESS NOTE DETAILS
Pt resting comfortably, states symptoms are improving. Received sign out from MIGUELITO Reis. Patient feeling well; continues to have right wrist drop however feels as though it may be slightly improved from yesterday. He is pending neurology attending evaluation. Occupational therapist provided consultation; patient given a wrist cock-up splint and advised on outpatient OT follow-up. Pending neurology attending kevon cleared by neuro. Reviewed all results and necessity for follow up. Counseled on red flags and to return for them.  Patient appears well on discharge.

## 2022-07-26 NOTE — ED CDU PROVIDER SUBSEQUENT DAY NOTE - NSICDXPASTMEDICALHX_GEN_ALL_CORE_FT
PAST MEDICAL HISTORY:  High triglycerides     HTN (hypertension)     Rectal cancer [id number], last chemo 1/2017

## 2022-07-26 NOTE — ED CDU PROVIDER DISPOSITION NOTE - CLINICAL COURSE
67-year-old male with history of dyslipidemia hypertension rectal cancer in remission presented with right arm weakness and numbness.  Patient reports that he fell asleep in office chair and now is numbness and weakness to the right wrist with notable right wrist drop.  Patient was placed in observation for evaluation of this.  The patient's cumulative work-up included labs that were grossly unremarkable CT brain, perfusion, CT angio head and neck were unremarkable.  Patient was seen by neurology who felt the patient had a radial nerve palsy and was recommended to see occupational therapy.  Occupational Therapy did see the patient in the observation and placed patient adaptive equipment for the right wrist patient will follow-up as an outpatient.  The patient was emphasized that the must follow-up for therapy.  Patient discharged.

## 2022-07-26 NOTE — ED CDU PROVIDER DISPOSITION NOTE - NSFOLLOWUPCLINICS_GEN_ALL_ED_FT
[facility] ([facility])  Rehabilitation  [facility] [address], [address] [place] [address]  Phone: [phone]  Fax:   Follow Up Time: 1-3 Days    [facility] ([facility])  Rehabilitation  [address] [place] [id number]  Phone: [phone]  Fax:   Follow Up Time: 1-3 Days

## 2022-07-26 NOTE — ED CDU PROVIDER INITIAL DAY NOTE - OBJECTIVE STATEMENT
68 yo M with PMHx of HTN, HLD, and rectal cancer in remission presents ot the ED c/o R arm numbness/weakness that started around 6AM and has been persisting. Pt went to sleep around 12AM and was asymptomatic. Pt went to his PMD and was advised to go to ED for further evaluation. Pt does endorse falling asleep in his office chair last night. He denies other complaints. Pt denies fever, chills, nausea, vomiting, abdominal pain, diarrhea, headache, dizziness, weakness, chest pain, SOB, back pain, LOC, trauma, urinary symptoms, cough, calf pain/swelling, recent travel, recent surgery.

## 2022-07-26 NOTE — ED CDU PROVIDER INITIAL DAY NOTE - PHYSICAL EXAMINATION
VITAL SIGNS: I have reviewed nursing notes and confirm.  CONSTITUTIONAL: Well-developed; well-nourished; in no acute distress.  SKIN: Skin exam is warm and dry, no acute rash.  HEAD: Normocephalic; atraumatic.  EYES: Conjunctiva and sclera clear.  ENT: No nasal discharge; airway clear.   CARD: S1, S2 normal; no murmurs, gallops, or rubs. Regular rate and rhythm.  RESP: No wheezes, rales or rhonchi. Speaking in full sentences.   ABD: Normal bowel sounds; soft; non-distended; non-tender; No rebound or guarding.   EXT: Normal ROM. No clubbing, cyanosis or edema.  NEURO: Alert, oriented. (+) RUE  strength 3/5, hand in flexed position, cannot extend. Otherwise non-focal.

## 2022-07-26 NOTE — ED CDU PROVIDER INITIAL DAY NOTE - NS ED ROS FT
Review of Systems  Constitutional:  No fever, chills.  Eyes:  No visual changes, eye pain, or discharge.  ENMT:  No hearing changes, pain, or discharge. No nasal congestion, discharge, or bleeding. No throat pain, swelling, or difficulty swallowing.  Cardiac:  No chest pain, palpitations, syncope, or edema.  Respiratory:  No dyspnea, cough. No hemoptysis.  GI:  No nausea, vomiting, diarrhea, or abdominal pain.   :  No dysuria, hematuria, frequency, or burning.   MS:  No back pain. (+) R arm weakness/numbness  Skin:  No skin rash, pruritis, jaundice, or lesions.  Neuro:  No headache, dizziness, loss of sensation, or focal weakness.  No change in mental status.

## 2022-07-26 NOTE — OCCUPATIONAL THERAPY INITIAL EVALUATION ADULT - PERTINENT HX OF CURRENT PROBLEM, REHAB EVAL
6 y/o male with PMHx HLD, HTN, Rectal Cancer (last chemo 2017) who presents with right arm weakness/numbness. Most likely due to radial nerve palsy

## 2022-07-29 LAB — METHYLMALONATE SERPL-SCNC: 198 NMOL/L

## 2022-08-04 ENCOUNTER — RESULT REVIEW (OUTPATIENT)
Age: 67
End: 2022-08-04

## 2022-08-04 ENCOUNTER — OUTPATIENT (OUTPATIENT)
Dept: OUTPATIENT SERVICES | Facility: HOSPITAL | Age: 67
LOS: 1 days | Discharge: HOME | End: 2022-08-04

## 2022-08-04 DIAGNOSIS — Z98.890 OTHER SPECIFIED POSTPROCEDURAL STATES: Chronic | ICD-10-CM

## 2022-08-04 DIAGNOSIS — C78.00 SECONDARY MALIGNANT NEOPLASM OF UNSPECIFIED LUNG: ICD-10-CM

## 2022-08-04 PROCEDURE — 74177 CT ABD & PELVIS W/CONTRAST: CPT | Mod: 26,MH

## 2022-08-04 PROCEDURE — 71260 CT THORAX DX C+: CPT | Mod: 26,MH

## 2022-10-27 ENCOUNTER — OUTPATIENT (OUTPATIENT)
Dept: OUTPATIENT SERVICES | Facility: HOSPITAL | Age: 67
LOS: 1 days | End: 2022-10-27

## 2022-10-27 DIAGNOSIS — Z98.890 OTHER SPECIFIED POSTPROCEDURAL STATES: Chronic | ICD-10-CM

## 2022-10-27 DIAGNOSIS — G56.31 LESION OF RADIAL NERVE, RIGHT UPPER LIMB: ICD-10-CM

## 2022-10-27 DIAGNOSIS — M21.331 WRIST DROP, RIGHT WRIST: ICD-10-CM

## 2022-11-04 NOTE — STROKE CODE NOTE - INITIAL PRESENTATION
Aakash Alvarez discharged to home .   Patient provided with the following educational materials upon discharge:  AVS.   Valuables and belongings sent with patient.   discharge summary, discharge instructions and follow up appointments reviewed with patient   Patientverbalized understanding. Patient stable at discharge.   ED

## 2023-01-13 ENCOUNTER — APPOINTMENT (OUTPATIENT)
Dept: HEMATOLOGY ONCOLOGY | Facility: CLINIC | Age: 68
End: 2023-01-13
Payer: MEDICARE

## 2023-01-13 ENCOUNTER — OUTPATIENT (OUTPATIENT)
Dept: OUTPATIENT SERVICES | Facility: HOSPITAL | Age: 68
LOS: 1 days | End: 2023-01-13

## 2023-01-13 ENCOUNTER — LABORATORY RESULT (OUTPATIENT)
Age: 68
End: 2023-01-13

## 2023-01-13 VITALS
BODY MASS INDEX: 22.15 KG/M2 | DIASTOLIC BLOOD PRESSURE: 70 MMHG | OXYGEN SATURATION: 99 % | WEIGHT: 150 LBS | TEMPERATURE: 98.8 F | HEART RATE: 67 BPM | RESPIRATION RATE: 14 BRPM | SYSTOLIC BLOOD PRESSURE: 154 MMHG

## 2023-01-13 DIAGNOSIS — Z98.890 OTHER SPECIFIED POSTPROCEDURAL STATES: Chronic | ICD-10-CM

## 2023-01-13 LAB
HCT VFR BLD CALC: 35.4 %
HGB BLD-MCNC: 12.4 G/DL
MCHC RBC-ENTMCNC: 34.7 PG
MCHC RBC-ENTMCNC: 35 G/DL
MCV RBC AUTO: 99.2 FL
PLATELET # BLD AUTO: 224 K/UL
PMV BLD: 8.9 FL
RBC # BLD: 3.57 M/UL
RBC # FLD: 10.9 %
WBC # FLD AUTO: 3.6 K/UL

## 2023-01-13 PROCEDURE — 99214 OFFICE O/P EST MOD 30 MIN: CPT

## 2023-01-13 RX ORDER — QUINAPRIL HYDROCHLORIDE 20 MG/1
20 TABLET, FILM COATED ORAL
Qty: 90 | Refills: 0 | Status: DISCONTINUED | COMMUNITY
Start: 2021-06-10 | End: 2023-01-13

## 2023-01-13 NOTE — ASSESSMENT
[Curative] : Goals of care discussed with patient: Curative [FreeTextEntry1] : 1 Stage III rectal cancer status post chemoradiation and LAR/ileostomy and 4 cycles of adjuvant chemotherapy with XELOX   Competed in December of 2016. . His STELLA nodule kept growing and underwent wedge resection On  October 1st 2019 which was consistent with oligometastatic disease. No chemotherapy is warranted at this time. But he is stage IV\par - Has CT C/A/P  8/2021   was LIZZ \par -had Colonoscopy in 5/2022 next 5/2025\par -CT C/A/P  on 8/2022 are uncahnged\par \par Plan  \par -  CMP CEA today \par --CT  C/A/P  for up to 5 years from 10/2019. His resection was now   over 2  years away as per NCCN after 2nd year we can check CT C/A/P every 6-12 months for up to 5  years and we deiced to repeat CTs again now\par \par 2. Decrease white blood cell count as well as macrocytosis and mild  anemia   is stable \par - he had a bone marrow that did not demonstrate any pathology possibly has ICUS or due to ETOH use \par - I suspect this is due this Alcohol use since he claims 3 glasses of wine daily  as and increase MCV may bee seen with Fatty liver as well which he has\par - I recommended only  one glass of wine a day\par -CBC  today  and he is on oral b12 and MMA were normal in 2022 will monitor on occasion \par \par RTC in  6 months if LIZZ will call with results \par \par \par \par

## 2023-01-13 NOTE — REVIEW OF SYSTEMS
[Negative] : Heme/Lymph [Fever] : no fever [Fatigue] : no fatigue [Recent Change In Weight] : ~T no recent weight change [Chest Pain] : no chest pain [Shortness Of Breath] : no shortness of breath [Abdominal Pain] : no abdominal pain [Vomiting] : no vomiting [Diarrhea] : no diarrhea [Dysuria] : no dysuria [Joint Pain] : no joint pain [Skin Rash] : no skin rash

## 2023-01-13 NOTE — RESULTS/DATA
[FreeTextEntry1] : [facility],   \par [address]  [place] [address]        [phone] \par \par Patient  Name:  [address]  :  1955 \par Patient  ID:  [id number] \par Account:  [id number] \par Patient  Location:  [facility] \par \par Accession:  [id number] \par Procedure:  CT  ABDOMEN  PELVIS  W  IV  AND  PO  CONTRAST  [id number] \par Date  of  Exam:  2017  01:52  PM \par Attending  Physician:  ALEX CONSTANTINO \par Requesting  Physician:  ALEX CONSTANTINO MD \par \par \par CLINICAL  STATEMENT:  Rectal  cancer  post  chemotherapy,  radiation  therapy  and  surgery.    \par   \par TECHNIQUE:  Contiguous  axial  CT  images  were  obtained  from  the  lung  bases  to  the  pubic  symphysis  following  administration  of  100cc  Optiray  320  intravenous  contrast.    Oral  contrast  was  administered.    Reformatted  images  in  the  coronal  and  sagittal  planes  were  acquired. \par   \par COMPARISON:    CT  abdomen  pelvis  dated  2016  and  MRI  of  pelvis  dated  2016 \par   \par FINDINGS: \par   \par LUNG  BASES:  There  is  an  unchanged  6  mm  groundglass  right  lower  lobe  pulmonary  nodule  (series  4,  image  42). \par LIVER:  Hepatic  steatosis. \par SPLEEN:  Unremarkable. \par PANCREAS:  Unremarkable. \par GALLBLADDER  AND  BILIARY  TREE:  There  is  cholelithiasis.  No  biliary  ductal  dilatation. \par ADRENALS:  Unremarkable. \par KIDNEYS:  Symmetric  enhancement  without  hydronephrosis. \par LYMPH  NODES:  There  are  no  enlarged  abdominal  or  pelvic  lymph  nodes. \par VASCULATURE:  The  abdominal  aorta  is  normal  in  caliber  and  demonstrates  atherosclerotic  changes. \par BOWEL:  Patient  is  post  rectal  anastomosis.  There  is  a  right  lower  quadrant  loop  ileostomy.  No  bowel  obstruction. \par PERITONEUM/RETROPERITONEUM/MESENTERY:  Presacral  edema.  No  ascites  or  pneumoperitoneum. \par PELVIC  VISCERA:  Unremarkable. \par BONES  AND  SOFT  TISSUES:  No  acute  osseous  abnormality  is  noted.  L4-L5  spondylolysis.  Grade  1  anterolisthesis  of  L5  on  S1.  \par   \par IMPRESSION: \par   \par Post  rectal  anastomosis  and  right  lower  quadrant  ileostomy  without  CT  evidence  of  abdominopelvic  residual  disease. \par   \par Unchanged  6  mm  groundglass  right  lower  lobe  pulmonary  nodule. \par   \par \par \par \par Original  final  report  dictated  and  signed  by  Dr. Eric Cristobal  on  2017  03:50  PM

## 2023-01-13 NOTE — HISTORY OF PRESENT ILLNESS
[Disease: _____________________] : Disease: [unfilled] [T: ___] : T[unfilled] [N: ___] : N[unfilled] [M: ___] : M[unfilled] [AJCC Stage: ____] : AJCC Stage: [unfilled] [de-identified] : Patient is a 60 year old male who was diagnosed with rectal carcinoma after symptoms of hematochezia, tenesmus. He then underwent colonoscopy with Dr. Dory Carrillo on 04/22/2016, that showed an obstructing circumferential mass at 10 cm from anal verge consistent with adenocarcinoma. Patient was given chemoradiation with xeloda and he finsihed it then underwent laparoscopic LAR with TME and diverting loop ileostomy on 8/30/2016. Patient recovered from surgery except for hospitalization with dehydration from diarrhea postoperatively that resolved. He was started on adjuvant Xelox on 10/4/2016 and completed 4 cycles in December of 2016. [de-identified] : adenocarcinoma [de-identified] :   adenocarcinoma, low grade, well differentiated.  Tumor invades through the muscularis propria into subserosal adipose tissue   \par  [de-identified] : 5/9/17\par He presents to establish care. He is to have his ostomy reversed but was noted to have low WBC on blood work. See bellprincess. Due to his cytopenia  he had a bone marrow biopsy in 2/2017 that showed hypocellular marrrow. Cytogenetics and FISH was negative.\par   He had a CT abd/pelvis on 3/2017 that showed Post rectal anastomosis and right lower quadrant ileostomy without CT evidence of abdominopelvic residual disease.Unchanged 6 mm ground-glass right lower lobe pulmonary nodule. \par \par He had a colonoscopy on April 10th by Dr. Handley and reports it was normal.  He was to have his ostomy reversed by Timmy Arriaga but he was not cleared by his  PMD Dr. Earline Acosta due to low WBC. Repeat CBC showed improvement.\par \par 7/17/17\par He is doing well. He has mild grade 1 neuropathy in feet that he barely feels. His bowel movements are improving.  No weight loss, no bleeding. Ostomy was reversed. .\par \par 8/2/17\par I brought him back for follow up do tof all in his HgB. He states he feels well except for chronic neuropathy. Stools are brown and urine is yellow.\par \par 10/11/17\par He is here for follow. He feels well.  He was started on Oral b12 tabs since his B12 level was low. He has no complaints.\par \par 1/10/18\par He is here for follow up.  He has a CT C/A/P on 10/20/17 that showed stable pulmonary nodules and No evidence of metastatic disease within the abdomen or pelvis.  CEA was 2. He feels well. Was started back on his ACEi for HTN. Normal bowel movements. No bleeding. He stopped his b12 \par \par 7/25/18\par He is here for follow up. He had an US liver on 5/2018 that showed fatty liver. Colonosocpy on 5/2018 by Dr. Handley  shwoed only  mild radiation proctitis next colonosocy in 3 years May 2021. Blood work quest in May 2018  CMP normal, Cholesterol was a bit high. WBC 3.6, HgB 13.9, , Lymphs low 842, Plts 199\par He states he feels fine. HE admits to drinking 3 glasses of wine every night. \par \par 10/17/18\par He is here for follow up. Blodo work from [facility] 8/8/18 BNP normal,  LFT normal,  Wbc 3.5 normal diff, hgb 13.3, PSA 1.5 .7\par \par 12/3/18\par Patient is here for a follow-up visit.  He is feeling well with no complaints.  Patient denies fever, chills, nausea, vomiting. CEA is stable.\par CT C/A/P 10/30/18 shows New solid left upper lobe pulmonary nodule measuring 5 mm. He has no complaints  \par \par 2/13/19\par Patient is here for a follow-up visit.  He is feeling well with no complaints.  Patient denies fever, chills, nausea, vomiting.  Most recent CEA from 12/6/18 is stable at 1.9ng/mL.  He has restarted Vitamin B12.  \par CT Chest 1/3/19 IMPRESSION: 1. 5 mm left upper lobe solid pulmonary nodule (4/116), stable since 10/30/2018 increased in size since 10/20/2017. Continued follow-up is recommended. 2. Additional bilateral subcentimeter nodules are stable since 5/11/2016. 3. No new suspicious mass or nodule. \par Labs 1/15/19 Normal CMP, TSH 9.68, Ferritin 393, WBC 3.4, Hgb 13.5, , , Folate 8\par \par 6/12/19\par He is here for follow-up of Rectal cancer.   He is doing well. he has no complaints. Still drinks 2-3 glasses of wine every night.\par \par 10/21/19\par He is here for follow up.  Since last visit he had CT C/A/ P on 8/26/19. It showed growth in his STELLA pulmonary nodules. He than had a PET/CT on   9/4/19 7 mm left upper lobe solid nodule is FDG avid, SUV max 4.5, consistent with biologic tumor activity.\par On October 1 st he underwent wedge resection by Dr. Benjamin  path was consistent with Metastatic adenocarcinoma with colorectal primary    NGS no mutations, MMR intact, \par \par He feels well. No complaints. \par \par 12/2/19\par Patient is here for a follow-up visit.  He is feeling well with no new complaints.  Patient denies fever, chills, nausea, vomiting, new pain or bleeding.  He has plans for blood work in early January and will go for repeat scans afterward.  This appointment was scheduled months in advance, due to timing of treatment, he was instructed to come in January; however this visit was never canceled.  \par \par 3/3/20\par He is doing well. He has no issues. He had CT C/A/P on 1/23/20: Post partial left upper lobe resection. \par No significant change in a 7 mm right lower lobe groundglass nodule.IMPRESSION: \par No findings of metastatic disease in the abdomen or pelvis.\par \par \par 6/2/20\par He is here for follow up. He has had constipation and with straining he started having pain in his right inguinal hernia and is pending to see Dr. John of surgery, Otherwise he is doing well.  \par \par 11/30/20\par He is here for follow up He had hernia repair in July with path showing only a hernia sac.  CEA ahs been normal.  CT C/A/P in June 2020 \par IMPRESSION:\par Since 1/23/2020,\par 1.  No significant interval change.\par 2.  Stable 7 mm right lower lobe groundglass nodule.\par 3.  Stable post partial left upper lobe resection.\par \par IMPRESSION:\par \par 1.  No findings of metastatic disease in the abdomen or pelvis.\par \par He feels well and has no complaints. Exam limited  due to COVID\par \par 6/14/2021\par He is here for follow up. He feels well and has no complaints. \par \par 1/21/22\par He is here for follow up. he had CT C/A/P in 8/2021 was  LIZZ. He feels well. He is now on folic acid  CBC from today is stable. \par \par \par 7/22/22\par He is here for follow up. He had a colonoscopy on  5/9/22 that was normal. Next on in 2025. CBC from today showed stable macrocitic anemia  and decrease WBC with lymphopenia. He takes b12.\par \par 1/13/2023\par He is here for follow up. Feels well. takes b12 and folate. has leg cramps on occasion

## 2023-01-15 LAB
ALBUMIN SERPL ELPH-MCNC: 4.8 G/DL
ALP BLD-CCNC: 56 U/L
ALT SERPL-CCNC: 23 U/L
ANION GAP SERPL CALC-SCNC: 11 MMOL/L
AST SERPL-CCNC: 28 U/L
BILIRUB SERPL-MCNC: 0.5 MG/DL
BUN SERPL-MCNC: 22 MG/DL
CALCIUM SERPL-MCNC: 9.9 MG/DL
CEA SERPL-MCNC: 1.3 NG/ML
CHLORIDE SERPL-SCNC: 95 MMOL/L
CO2 SERPL-SCNC: 26 MMOL/L
CREAT SERPL-MCNC: 1.3 MG/DL
EGFR: 60 ML/MIN/1.73M2
GLUCOSE SERPL-MCNC: 107 MG/DL
POTASSIUM SERPL-SCNC: 4.6 MMOL/L
PROT SERPL-MCNC: 7.1 G/DL
SODIUM SERPL-SCNC: 132 MMOL/L

## 2023-01-16 DIAGNOSIS — C20 MALIGNANT NEOPLASM OF RECTUM: ICD-10-CM

## 2023-01-16 DIAGNOSIS — C78.00 SECONDARY MALIGNANT NEOPLASM OF UNSPECIFIED LUNG: ICD-10-CM

## 2023-01-29 ENCOUNTER — RESULT REVIEW (OUTPATIENT)
Age: 68
End: 2023-01-29

## 2023-01-29 ENCOUNTER — OUTPATIENT (OUTPATIENT)
Dept: OUTPATIENT SERVICES | Facility: HOSPITAL | Age: 68
LOS: 1 days | Discharge: HOME | End: 2023-01-29
Payer: MEDICARE

## 2023-01-29 DIAGNOSIS — Z98.890 OTHER SPECIFIED POSTPROCEDURAL STATES: Chronic | ICD-10-CM

## 2023-01-29 DIAGNOSIS — C20 MALIGNANT NEOPLASM OF RECTUM: ICD-10-CM

## 2023-01-29 PROCEDURE — 74177 CT ABD & PELVIS W/CONTRAST: CPT | Mod: 26,MH

## 2023-01-29 PROCEDURE — 71260 CT THORAX DX C+: CPT | Mod: 26,MH

## 2023-07-24 ENCOUNTER — APPOINTMENT (OUTPATIENT)
Dept: HEMATOLOGY ONCOLOGY | Facility: CLINIC | Age: 68
End: 2023-07-24
Payer: MEDICARE

## 2023-07-24 ENCOUNTER — OUTPATIENT (OUTPATIENT)
Dept: OUTPATIENT SERVICES | Facility: HOSPITAL | Age: 68
LOS: 1 days | End: 2023-07-24
Payer: MEDICARE

## 2023-07-24 ENCOUNTER — LABORATORY RESULT (OUTPATIENT)
Age: 68
End: 2023-07-24

## 2023-07-24 VITALS
HEART RATE: 58 BPM | TEMPERATURE: 98.3 F | HEIGHT: 69 IN | SYSTOLIC BLOOD PRESSURE: 127 MMHG | DIASTOLIC BLOOD PRESSURE: 73 MMHG | RESPIRATION RATE: 14 BRPM | BODY MASS INDEX: 21.62 KG/M2 | WEIGHT: 146 LBS

## 2023-07-24 DIAGNOSIS — C20 MALIGNANT NEOPLASM OF RECTUM: ICD-10-CM

## 2023-07-24 DIAGNOSIS — Z98.890 OTHER SPECIFIED POSTPROCEDURAL STATES: Chronic | ICD-10-CM

## 2023-07-24 PROCEDURE — 85027 COMPLETE CBC AUTOMATED: CPT

## 2023-07-24 PROCEDURE — 99214 OFFICE O/P EST MOD 30 MIN: CPT

## 2023-07-24 PROCEDURE — 80053 COMPREHEN METABOLIC PANEL: CPT

## 2023-07-24 PROCEDURE — 82378 CARCINOEMBRYONIC ANTIGEN: CPT

## 2023-07-24 NOTE — REVIEW OF SYSTEMS
[Negative] : Heme/Lymph [Fever] : no fever [Fatigue] : no fatigue [Recent Change In Weight] : ~T no recent weight change [Chest Pain] : no chest pain [Shortness Of Breath] : no shortness of breath [Abdominal Pain] : no abdominal pain [Vomiting] : no vomiting [Dysuria] : no dysuria [Joint Pain] : no joint pain [Skin Rash] : no skin rash

## 2023-07-24 NOTE — HISTORY OF PRESENT ILLNESS
[Disease: _____________________] : Disease: [unfilled] [T: ___] : T[unfilled] [N: ___] : N[unfilled] [M: ___] : M[unfilled] [AJCC Stage: ____] : AJCC Stage: [unfilled] [de-identified] : Patient is a 60 year old male who was diagnosed with rectal carcinoma after symptoms of hematochezia, tenesmus. He then underwent colonoscopy with Dr. Dory Carrillo on 04/22/2016, that showed an obstructing circumferential mass at 10 cm from anal verge consistent with adenocarcinoma. Patient was given chemoradiation with xeloda and he finsihed it then underwent laparoscopic LAR with TME and diverting loop ileostomy on 8/30/2016. Patient recovered from surgery except for hospitalization with dehydration from diarrhea postoperatively that resolved. He was started on adjuvant Xelox on 10/4/2016 and completed 4 cycles in December of 2016. [de-identified] : adenocarcinoma [de-identified] :   adenocarcinoma, low grade, well differentiated.  Tumor invades through the muscularis propria into subserosal adipose tissue   \par  [de-identified] : 5/9/17\par He presents to establish care. He is to have his ostomy reversed but was noted to have low WBC on blood work. See bellprincess. Due to his cytopenia  he had a bone marrow biopsy in 2/2017 that showed hypocellular marrrow. Cytogenetics and FISH was negative.\par   He had a CT abd/pelvis on 3/2017 that showed Post rectal anastomosis and right lower quadrant ileostomy without CT evidence of abdominopelvic residual disease.Unchanged 6 mm ground-glass right lower lobe pulmonary nodule. \par \par He had a colonoscopy on April 10th by Dr. Handley and reports it was normal.  He was to have his ostomy reversed by Timmy Arriaga but he was not cleared by his  PMD Dr. Earline Acosta due to low WBC. Repeat CBC showed improvement.\par \par 7/17/17\par He is doing well. He has mild grade 1 neuropathy in feet that he barely feels. His bowel movements are improving.  No weight loss, no bleeding. Ostomy was reversed. .\par \par 8/2/17\par I brought him back for follow up do tof all in his HgB. He states he feels well except for chronic neuropathy. Stools are brown and urine is yellow.\par \par 10/11/17\par He is here for follow. He feels well.  He was started on Oral b12 tabs since his B12 level was low. He has no complaints.\par \par 1/10/18\par He is here for follow up.  He has a CT C/A/P on 10/20/17 that showed stable pulmonary nodules and No evidence of metastatic disease within the abdomen or pelvis.  CEA was 2. He feels well. Was started back on his ACEi for HTN. Normal bowel movements. No bleeding. He stopped his b12 \par \par 7/25/18\par He is here for follow up. He had an US liver on 5/2018 that showed fatty liver. Colonosocpy on 5/2018 by Dr. Handley  shwoed only  mild radiation proctitis next colonosocy in 3 years May 2021. Blood work [facility] in May 2018  CMP normal, Cholesterol was a bit high. WBC 3.6, HgB 13.9, , Lymphs low 842, Plts 199\par He states he feels fine. HE admits to drinking 3 glasses of wine every night. \par \par 10/17/18\par He is here for follow up. Blodo work from [facility] 8/8/18 BNP normal,  LFT normal,  Wbc 3.5 normal diff, hgb 13.3, PSA 1.5 .7\par \par 12/3/18\par Patient is here for a follow-up visit.  He is feeling well with no complaints.  Patient denies fever, chills, nausea, vomiting. CEA is stable.\par CT C/A/P 10/30/18 shows New solid left upper lobe pulmonary nodule measuring 5 mm. He has no complaints  \par \par 2/13/19\par Patient is here for a follow-up visit.  He is feeling well with no complaints.  Patient denies fever, chills, nausea, vomiting.  Most recent CEA from 12/6/18 is stable at 1.9ng/mL.  He has restarted Vitamin B12.  \par CT Chest 1/3/19 IMPRESSION: 1. 5 mm left upper lobe solid pulmonary nodule (4/116), stable since 10/30/2018 increased in size since 10/20/2017. Continued follow-up is recommended. 2. Additional bilateral subcentimeter nodules are stable since 5/11/2016. 3. No new suspicious mass or nodule. \par Labs 1/15/19 Normal CMP, TSH 9.68, Ferritin 393, WBC 3.4, Hgb 13.5, , , Folate 8\par \par 6/12/19\par He is here for follow-up of Rectal cancer.   He is doing well. he has no complaints. Still drinks 2-3 glasses of wine every night.\par \par 10/21/19\par He is here for follow up.  Since last visit he had CT C/A/ P on 8/26/19. It showed growth in his STELLA pulmonary nodules. He than had a PET/CT on   9/4/19 7 mm left upper lobe solid nodule is FDG avid, SUV max 4.5, consistent with biologic tumor activity.\par On October 1 st he underwent wedge resection by Dr. Benjamin  path was consistent with Metastatic adenocarcinoma with colorectal primary    NGS no mutations, MMR intact, \par \par He feels well. No complaints. \par \par 12/2/19\par Patient is here for a follow-up visit.  He is feeling well with no new complaints.  Patient denies fever, chills, nausea, vomiting, new pain or bleeding.  He has plans for blood work in early January and will go for repeat scans afterward.  This appointment was scheduled months in advance, due to timing of treatment, he was instructed to come in January; however this visit was never canceled.  \par \par 3/3/20\par He is doing well. He has no issues. He had CT C/A/P on 1/23/20: Post partial left upper lobe resection. \par No significant change in a 7 mm right lower lobe groundglass nodule.IMPRESSION: \par No findings of metastatic disease in the abdomen or pelvis.\par \par \par 6/2/20\par He is here for follow up. He has had constipation and with straining he started having pain in his right inguinal hernia and is pending to see Dr. John of surgery, Otherwise he is doing well.  \par \par 11/30/20\par He is here for follow up He had hernia repair in July with path showing only a hernia sac.  CEA ahs been normal.  CT C/A/P in June 2020 \par IMPRESSION:\par Since 1/23/2020,\par 1.  No significant interval change.\par 2.  Stable 7 mm right lower lobe groundglass nodule.\par 3.  Stable post partial left upper lobe resection.\par \par IMPRESSION:\par \par 1.  No findings of metastatic disease in the abdomen or pelvis.\par \par He feels well and has no complaints. Exam limited  due to COVID\par \par 6/14/2021\par He is here for follow up. He feels well and has no complaints. \par \par 1/21/22\par He is here for follow up. he had CT C/A/P in 8/2021 was  LIZZ. He feels well. He is now on folic acid  CBC from today is stable. \par \par \par 7/22/22\par He is here for follow up. He had a colonoscopy on  5/9/22 that was normal. Next on in 2025. CBC from today showed stable macrocitic anemia  and decrease WBC with lymphopenia. He takes b12.\par \par 1/13/2023\par He is here for follow up. Feels well. takes b12 and folate. has leg cramps on occasion\par \par 7/24/23\par He is here for follow up. He feels well.

## 2023-07-24 NOTE — RESULTS/DATA
[FreeTextEntry1] : [facility],   \par [address]  [place] [address]        [phone] \par \par Patient  Name:  [address]  :  1955 \par Patient  ID:  [id number] \par Account:  [id number] \par Patient  Location:  [facility] \par \par Accession:  [id number] \par Procedure:  CT  ABDOMEN  PELVIS  W  IV  AND  PO  CONTRAST  [id number] \par Date  of  Exam:  2017  01:52  PM \par Attending  Physician:  ALEX CONSTANTINO \par Requesting  Physician:  ALEX CONSTANTINO MD \par \par \par CLINICAL  STATEMENT:  Rectal  cancer  post  chemotherapy,  radiation  therapy  and  surgery.    \par   \par TECHNIQUE:  Contiguous  axial  CT  images  were  obtained  from  the  lung  bases  to  the  pubic  symphysis  following  administration  of  100cc  Optiray  320  intravenous  contrast.    Oral  contrast  was  administered.    Reformatted  images  in  the  coronal  and  sagittal  planes  were  acquired. \par   \par COMPARISON:    CT  abdomen  pelvis  dated  2016  and  MRI  of  pelvis  dated  2016 \par   \par FINDINGS: \par   \par LUNG  BASES:  There  is  an  unchanged  6  mm  groundglass  right  lower  lobe  pulmonary  nodule  (series  4,  image  42). \par LIVER:  Hepatic  steatosis. \par SPLEEN:  Unremarkable. \par PANCREAS:  Unremarkable. \par GALLBLADDER  AND  BILIARY  TREE:  There  is  cholelithiasis.  No  biliary  ductal  dilatation. \par ADRENALS:  Unremarkable. \par KIDNEYS:  Symmetric  enhancement  without  hydronephrosis. \par LYMPH  NODES:  There  are  no  enlarged  abdominal  or  pelvic  lymph  nodes. \par VASCULATURE:  The  abdominal  aorta  is  normal  in  caliber  and  demonstrates  atherosclerotic  changes. \par BOWEL:  Patient  is  post  rectal  anastomosis.  There  is  a  right  lower  quadrant  loop  ileostomy.  No  bowel  obstruction. \par PERITONEUM/RETROPERITONEUM/MESENTERY:  Presacral  edema.  No  ascites  or  pneumoperitoneum. \par PELVIC  VISCERA:  Unremarkable. \par BONES  AND  SOFT  TISSUES:  No  acute  osseous  abnormality  is  noted.  L4-L5  spondylolysis.  Grade  1  anterolisthesis  of  L5  on  S1.  \par   \par IMPRESSION: \par   \par Post  rectal  anastomosis  and  right  lower  quadrant  ileostomy  without  CT  evidence  of  abdominopelvic  residual  disease. \par   \par Unchanged  6  mm  groundglass  right  lower  lobe  pulmonary  nodule. \par   \par \par \par \par Original  final  report  dictated  and  signed  by  Dr. Eric Cristobal  on  2017  03:50  PM

## 2023-07-24 NOTE — ASSESSMENT
[Curative] : Goals of care discussed with patient: Curative [FreeTextEntry1] : 1 Stage III rectal cancer status post chemoradiation and LAR/ileostomy and 4 cycles of adjuvant chemotherapy with XELOX   Competed in December of 2016. . His STELLA nodule kept growing and underwent wedge resection On  October 1st 2019 which was consistent with oligometastatic disease. No chemotherapy is warranted at this time. But he is stage IV\par - Has CT C/A/P  8/2021   was LIZZ \par -CT C/A/P  on 8/2022 are uncahnged\par -CT C/A/P 1/2023 LIZZ, unchanged nodule 7 mm groundglass \par \par Plan  \par -  CBC, CMP CEA today \par --CT  C/A/P  for up to 5 years from 10/2019, Will check again 1/2024\par -had Colonoscopy in 5/2022 next 5/2025\par \par 2. Decrease white blood cell count as well as macrocytosis and mild  anemia   is stable \par - he had a bone marrow that did not demonstrate any pathology possibly has ICUS or due to ETOH use \par - I suspect this is due this Alcohol use since he claims 3 glasses of wine daily  as and increase MCV may bee seen with Fatty liver as well which he has\par - I recommended only  one glass of wine a day\par -CBC  today  and he is on oral b12 and MMA were normal in 2022 will monitor on occasion \par \par RTC in 1/2024 and will order CTs than\par \par \par \par

## 2023-07-25 DIAGNOSIS — C20 MALIGNANT NEOPLASM OF RECTUM: ICD-10-CM

## 2023-07-26 ENCOUNTER — INPATIENT (INPATIENT)
Facility: HOSPITAL | Age: 68
LOS: 0 days | Discharge: HOME | DRG: 641 | End: 2023-07-27
Attending: STUDENT IN AN ORGANIZED HEALTH CARE EDUCATION/TRAINING PROGRAM | Admitting: INTERNAL MEDICINE
Payer: MEDICARE

## 2023-07-26 VITALS
HEIGHT: 69 IN | SYSTOLIC BLOOD PRESSURE: 148 MMHG | DIASTOLIC BLOOD PRESSURE: 70 MMHG | TEMPERATURE: 98 F | HEART RATE: 78 BPM | WEIGHT: 147.05 LBS | OXYGEN SATURATION: 99 % | RESPIRATION RATE: 16 BRPM

## 2023-07-26 DIAGNOSIS — Z90.2 ACQUIRED ABSENCE OF LUNG [PART OF]: ICD-10-CM

## 2023-07-26 DIAGNOSIS — E87.5 HYPERKALEMIA: ICD-10-CM

## 2023-07-26 DIAGNOSIS — C78.02 SECONDARY MALIGNANT NEOPLASM OF LEFT LUNG: ICD-10-CM

## 2023-07-26 DIAGNOSIS — Z98.890 OTHER SPECIFIED POSTPROCEDURAL STATES: Chronic | ICD-10-CM

## 2023-07-26 DIAGNOSIS — N17.9 ACUTE KIDNEY FAILURE, UNSPECIFIED: ICD-10-CM

## 2023-07-26 DIAGNOSIS — Z85.048 PERSONAL HISTORY OF OTHER MALIGNANT NEOPLASM OF RECTUM, RECTOSIGMOID JUNCTION, AND ANUS: ICD-10-CM

## 2023-07-26 DIAGNOSIS — Z88.0 ALLERGY STATUS TO PENICILLIN: ICD-10-CM

## 2023-07-26 DIAGNOSIS — Z87.891 PERSONAL HISTORY OF NICOTINE DEPENDENCE: ICD-10-CM

## 2023-07-26 DIAGNOSIS — I10 ESSENTIAL (PRIMARY) HYPERTENSION: ICD-10-CM

## 2023-07-26 DIAGNOSIS — E86.0 DEHYDRATION: ICD-10-CM

## 2023-07-26 DIAGNOSIS — D53.9 NUTRITIONAL ANEMIA, UNSPECIFIED: ICD-10-CM

## 2023-07-26 DIAGNOSIS — Z92.21 PERSONAL HISTORY OF ANTINEOPLASTIC CHEMOTHERAPY: ICD-10-CM

## 2023-07-26 DIAGNOSIS — E78.5 HYPERLIPIDEMIA, UNSPECIFIED: ICD-10-CM

## 2023-07-26 LAB
ALBUMIN SERPL ELPH-MCNC: 4.5 G/DL — SIGNIFICANT CHANGE UP (ref 3.5–5.2)
ALBUMIN SERPL ELPH-MCNC: 4.8 G/DL
ALP BLD-CCNC: 54 U/L
ALP SERPL-CCNC: 50 U/L — SIGNIFICANT CHANGE UP (ref 30–115)
ALT FLD-CCNC: 17 U/L — SIGNIFICANT CHANGE UP (ref 0–41)
ALT SERPL-CCNC: 18 U/L
ANION GAP SERPL CALC-SCNC: 12 MMOL/L — SIGNIFICANT CHANGE UP (ref 7–14)
ANION GAP SERPL CALC-SCNC: 13 MMOL/L
AST SERPL-CCNC: 21 U/L
AST SERPL-CCNC: 24 U/L — SIGNIFICANT CHANGE UP (ref 0–41)
BASOPHILS # BLD AUTO: 0 K/UL — SIGNIFICANT CHANGE UP (ref 0–0.2)
BASOPHILS NFR BLD AUTO: 0 % — SIGNIFICANT CHANGE UP (ref 0–1)
BILIRUB SERPL-MCNC: 0.5 MG/DL — SIGNIFICANT CHANGE UP (ref 0.2–1.2)
BILIRUB SERPL-MCNC: 0.6 MG/DL
BUN SERPL-MCNC: 40 MG/DL — HIGH (ref 10–20)
BUN SERPL-MCNC: 41 MG/DL
CALCIUM SERPL-MCNC: 10 MG/DL
CALCIUM SERPL-MCNC: 9.6 MG/DL — SIGNIFICANT CHANGE UP (ref 8.4–10.5)
CEA SERPL-MCNC: 1.2 NG/ML
CHLORIDE SERPL-SCNC: 100 MMOL/L
CHLORIDE SERPL-SCNC: 101 MMOL/L — SIGNIFICANT CHANGE UP (ref 98–110)
CO2 SERPL-SCNC: 22 MMOL/L — SIGNIFICANT CHANGE UP (ref 17–32)
CO2 SERPL-SCNC: 24 MMOL/L
CREAT SERPL-MCNC: 2.1 MG/DL — HIGH (ref 0.7–1.5)
CREAT SERPL-MCNC: 2.5 MG/DL
EGFR: 27 ML/MIN/1.73M2
EGFR: 33 ML/MIN/1.73M2 — LOW
EOSINOPHIL # BLD AUTO: 0 K/UL — SIGNIFICANT CHANGE UP (ref 0–0.7)
EOSINOPHIL NFR BLD AUTO: 0 % — SIGNIFICANT CHANGE UP (ref 0–8)
GIANT PLATELETS BLD QL SMEAR: PRESENT — SIGNIFICANT CHANGE UP
GLUCOSE SERPL-MCNC: 101 MG/DL
GLUCOSE SERPL-MCNC: 107 MG/DL — HIGH (ref 70–99)
HCT VFR BLD CALC: 30.3 % — LOW (ref 42–52)
HCT VFR BLD CALC: 33.5 %
HGB BLD-MCNC: 10.4 G/DL — LOW (ref 14–18)
HGB BLD-MCNC: 11.4 G/DL
LYMPHOCYTES # BLD AUTO: 0.56 K/UL — LOW (ref 1.2–3.4)
LYMPHOCYTES # BLD AUTO: 13.9 % — LOW (ref 20.5–51.1)
MAGNESIUM SERPL-MCNC: 2.1 MG/DL — SIGNIFICANT CHANGE UP (ref 1.8–2.4)
MANUAL SMEAR VERIFICATION: SIGNIFICANT CHANGE UP
MCHC RBC-ENTMCNC: 34 G/DL
MCHC RBC-ENTMCNC: 34.2 PG
MCHC RBC-ENTMCNC: 34.3 G/DL — SIGNIFICANT CHANGE UP (ref 32–37)
MCHC RBC-ENTMCNC: 35.6 PG — HIGH (ref 27–31)
MCV RBC AUTO: 100.6 FL
MCV RBC AUTO: 103.8 FL — HIGH (ref 80–94)
METAMYELOCYTES # FLD: 0.9 % — HIGH (ref 0–0)
MONOCYTES # BLD AUTO: 0.35 K/UL — SIGNIFICANT CHANGE UP (ref 0.1–0.6)
MONOCYTES NFR BLD AUTO: 8.7 % — SIGNIFICANT CHANGE UP (ref 1.7–9.3)
NEUTROPHILS # BLD AUTO: 3.08 K/UL — SIGNIFICANT CHANGE UP (ref 1.4–6.5)
NEUTROPHILS NFR BLD AUTO: 73 % — SIGNIFICANT CHANGE UP (ref 42.2–75.2)
NEUTS BAND # BLD: 3.5 % — SIGNIFICANT CHANGE UP (ref 0–6)
OVALOCYTES BLD QL SMEAR: SLIGHT — SIGNIFICANT CHANGE UP
PLAT MORPH BLD: NORMAL — SIGNIFICANT CHANGE UP
PLATELET # BLD AUTO: 186 K/UL — SIGNIFICANT CHANGE UP (ref 130–400)
PLATELET # BLD AUTO: 207 K/UL
PMV BLD: 8.7 FL
PMV BLD: 9.9 FL — SIGNIFICANT CHANGE UP (ref 7.4–10.4)
POIKILOCYTOSIS BLD QL AUTO: SIGNIFICANT CHANGE UP
POTASSIUM SERPL-MCNC: 5.5 MMOL/L — HIGH (ref 3.5–5)
POTASSIUM SERPL-SCNC: 5.4 MMOL/L
POTASSIUM SERPL-SCNC: 5.5 MMOL/L — HIGH (ref 3.5–5)
PROT SERPL-MCNC: 6.8 G/DL
PROT SERPL-MCNC: 6.9 G/DL — SIGNIFICANT CHANGE UP (ref 6–8)
RBC # BLD: 2.92 M/UL — LOW (ref 4.7–6.1)
RBC # BLD: 3.33 M/UL
RBC # FLD: 11.8 %
RBC # FLD: 11.8 % — SIGNIFICANT CHANGE UP (ref 11.5–14.5)
RBC BLD AUTO: ABNORMAL
SODIUM SERPL-SCNC: 135 MMOL/L — SIGNIFICANT CHANGE UP (ref 135–146)
SODIUM SERPL-SCNC: 137 MMOL/L
WBC # BLD: 4.03 K/UL — LOW (ref 4.8–10.8)
WBC # FLD AUTO: 4.03 K/UL — LOW (ref 4.8–10.8)
WBC # FLD AUTO: 4.2 K/UL

## 2023-07-26 PROCEDURE — 85027 COMPLETE CBC AUTOMATED: CPT

## 2023-07-26 PROCEDURE — 81003 URINALYSIS AUTO W/O SCOPE: CPT

## 2023-07-26 PROCEDURE — 86803 HEPATITIS C AB TEST: CPT

## 2023-07-26 PROCEDURE — 93010 ELECTROCARDIOGRAM REPORT: CPT

## 2023-07-26 PROCEDURE — 80048 BASIC METABOLIC PNL TOTAL CA: CPT | Mod: 59

## 2023-07-26 PROCEDURE — 36415 COLL VENOUS BLD VENIPUNCTURE: CPT

## 2023-07-26 PROCEDURE — 99285 EMERGENCY DEPT VISIT HI MDM: CPT

## 2023-07-26 PROCEDURE — 93005 ELECTROCARDIOGRAM TRACING: CPT

## 2023-07-26 PROCEDURE — 99222 1ST HOSP IP/OBS MODERATE 55: CPT | Mod: GC

## 2023-07-26 RX ORDER — PREGABALIN 225 MG/1
1000 CAPSULE ORAL DAILY
Refills: 0 | Status: DISCONTINUED | OUTPATIENT
Start: 2023-07-26 | End: 2023-07-27

## 2023-07-26 RX ORDER — FENOFIBRATE,MICRONIZED 130 MG
145 CAPSULE ORAL DAILY
Refills: 0 | Status: DISCONTINUED | OUTPATIENT
Start: 2023-07-26 | End: 2023-07-26

## 2023-07-26 RX ORDER — FENOFIBRATE,MICRONIZED 130 MG
1 CAPSULE ORAL
Qty: 0 | Refills: 0 | DISCHARGE

## 2023-07-26 RX ORDER — SODIUM CHLORIDE 9 MG/ML
1000 INJECTION, SOLUTION INTRAVENOUS ONCE
Refills: 0 | Status: COMPLETED | OUTPATIENT
Start: 2023-07-26 | End: 2023-07-26

## 2023-07-26 RX ORDER — QUINAPRIL HYDROCHLORIDE 40 MG/1
1 TABLET, FILM COATED ORAL
Qty: 0 | Refills: 0 | DISCHARGE

## 2023-07-26 RX ORDER — FOLIC ACID 0.8 MG
1 TABLET ORAL DAILY
Refills: 0 | Status: DISCONTINUED | OUTPATIENT
Start: 2023-07-26 | End: 2023-07-27

## 2023-07-26 RX ORDER — SODIUM CHLORIDE 9 MG/ML
1000 INJECTION, SOLUTION INTRAVENOUS
Refills: 0 | Status: DISCONTINUED | OUTPATIENT
Start: 2023-07-26 | End: 2023-07-27

## 2023-07-26 RX ADMIN — SODIUM CHLORIDE 1000 MILLILITER(S): 9 INJECTION, SOLUTION INTRAVENOUS at 15:29

## 2023-07-26 RX ADMIN — SODIUM CHLORIDE 75 MILLILITER(S): 9 INJECTION, SOLUTION INTRAVENOUS at 22:20

## 2023-07-26 RX ADMIN — SODIUM CHLORIDE 1000 MILLILITER(S): 9 INJECTION, SOLUTION INTRAVENOUS at 15:11

## 2023-07-26 NOTE — ED ADULT NURSE NOTE - NSFALLUNIVINTERV_ED_ALL_ED
Bed/Stretcher in lowest position, wheels locked, appropriate side rails in place/Call bell, personal items and telephone in reach/Instruct patient to call for assistance before getting out of bed/chair/stretcher/Non-slip footwear applied when patient is off stretcher/[place] to call system/Physically safe environment - no spills, clutter or unnecessary equipment/Purposeful proactive rounding/Room/bathroom lighting operational, light cord in reach

## 2023-07-26 NOTE — ED ADULT NURSE NOTE - NSICDXPASTMEDICALHX_GEN_ALL_CORE_FT
PAST MEDICAL HISTORY:  High triglycerides     HTN (hypertension)     Rectal cancer [id number], last chemo 1/2017

## 2023-07-26 NOTE — ED ADULT TRIAGE NOTE - CHIEF COMPLAINT QUOTE
"I was at Dr. Corbett's office and they said my creatinine is doubled." Pt with hx of colorectal cancer, not on chemotherapy. Pt without symptoms/complaint.

## 2023-07-26 NOTE — H&P ADULT - ATTENDING COMMENTS
HPI:  68-year-old male with history of hypertension, history of colorectal cancer status post chemotherapy, currently not on active treatment in remission, who presents for elevated creatinine on labs drawn on the 24th during routine follow up.  He was sent to by Dr. Dumont for admission.  He admits to likely dehydration while being in the [facility], reports increased heat exposure.  Denies any symptoms currently including decreased urination, vomiting, fever, abdominal pain. States urine is clear, no urinary sx.    In the ED: 148/70 mmHg. HR: 78 bpm, RR: 16, Temp: 98, SpO2: 99% RA    Labs : WBC: 4.04, Hg: 10.4, MCV: 103,8, Na: 135, K: 5.5, Crea: 2.1 (Baseline 1.4)    s/p 1L LR and admitted to medicine      (26 Jul 2023 19:54)    REVIEW OF SYSTEMS: see cc/HPI   CONSTITUTIONAL: No weakness, fevers or chills  EYES/ENT: No visual changes;  No vertigo or throat pain   NECK: No pain or stiffness  RESPIRATORY: No cough, wheezing, hemoptysis; No shortness of breath  CARDIOVASCULAR: No chest pain or palpitations  GASTROINTESTINAL: No abdominal or epigastric pain. No nausea, vomiting, or hematemesis; No diarrhea or constipation. No melena or hematochezia.  GENITOURINARY: No dysuria, frequency or hematuria  NEUROLOGICAL: No numbness or weakness  SKIN: No itching, rashes  PSYCHE:  ENDO:  MSK:    Physical Exam:  General: WN/WD NAD  Neurology: A&Ox3, nonfocal, follows commands  Eyes: PERRLA/ EOMI  ENT/Neck: Neck supple, trachea midline, No JVD  Respiratory: CTA B/L, No wheezing, rales, rhonchi  CV: Normal rate regular rhythm, S1S2, no murmurs, rubs or gallops  Abdominal: Soft, NT, ND +BS,   Extremities: No edema, + peripheral pulses  Skin: No Rashes, Hematoma, Ecchymosis  Incisions:   Tubes: HPI:  68-year-old male with history of hypertension, history of colorectal cancer status post chemotherapy, currently not on active treatment in remission, who presents for elevated creatinine on labs drawn on the 24th during routine follow up.  He was sent to by Dr. Dumont for admission.  He admits to likely dehydration while being in the [facility], reports increased heat exposure.  Denies any symptoms currently including decreased urination, vomiting, fever, abdominal pain. States urine is clear, no urinary sx.    In the ED: 148/70 mmHg. HR: 78 bpm, RR: 16, Temp: 98, SpO2: 99% RA    Labs : WBC: 4.04, Hg: 10.4, MCV: 103,8, Na: 135, K: 5.5, Crea: 2.1 (Baseline 1.4)    s/p 1L LR and admitted to medicine      (26 Jul 2023 19:54)    REVIEW OF SYSTEMS: see cc/HPI   CONSTITUTIONAL: No weakness, fevers or chills  EYES/ENT: No visual changes;  No vertigo or throat pain   NECK: No pain or stiffness  RESPIRATORY: No cough, wheezing, hemoptysis; No shortness of breath  CARDIOVASCULAR: No chest pain or palpitations  GASTROINTESTINAL: No abdominal or epigastric pain. No nausea, vomiting, or hematemesis; No diarrhea or constipation. No melena or hematochezia.  GENITOURINARY: No dysuria, frequency or hematuria  NEUROLOGICAL: No numbness or weakness  SKIN: No itching, rashes  PSYCHE:  ENDO:  MSK:    Physical Exam:  General: WN/WD NAD  Neurology: A&Ox3, nonfocal, follows commands  Eyes: PERRLA/ EOMI  ENT/Neck: Neck supple, trachea midline, No JVD  Respiratory: CTA B/L, No wheezing, rales, rhonchi  CV: Normal rate regular rhythm, S1S2, no murmurs, rubs or gallops  Abdominal: Soft, NT, ND +BS,   Extremities: No edema, + peripheral pulses  Skin: No Rashes, Hematoma, Ecchymosis  Incisions:   Tubes:    A/p  MERCY 2/2 dehydration   Dehydration   Hyperkalemia   -IV fluids   - serial Scr / K+  -agree w/ RBU/s   -urine lytes/cr     HTN - stable   -hold ARB    Dyslipidemia   -c/w fenofibrate     DVT prophylaxis

## 2023-07-26 NOTE — ED PROVIDER NOTE - NSICDXPASTMEDICALHX_GEN_ALL_CORE_FT
PAST MEDICAL HISTORY:  High triglycerides     HTN (hypertension)     Rectal cancer [id number], last chemo 1/2017

## 2023-07-26 NOTE — ED ADULT NURSE NOTE - OBJECTIVE STATEMENT
68 year old male, presenting to ED c/o abnormal labs. Pt sent by outpt MD due to elevated kidney functions. Pt h/o Colorectal Ca. Denies n/v/d/fevers/chills. Pt A&Ox4, ambulatory baseline.    Fall precations implemented, BA in place, red socks utilized.

## 2023-07-26 NOTE — H&P ADULT - HISTORY OF PRESENT ILLNESS
68-year-old male with history of hypertension, history of colorectal cancer status post chemotherapy, currently not on active treatment in remission, who presents for elevated creatinine on labs drawn on the 24th.  He was sent to by Dr. Dumont for admission.  He admits to likely dehydration while being in the [facility], reports increased heat exposure.  Denies any symptoms currently including decreased urination, vomiting, fever, abdominal pain    In the ED: 148/70 mmHg. HR: 78 bpm, RR: 16, Temp: 98, SpO2: 99% RA    Labs : WBC: 4.04, Hg: 10.4, MCV: 103,8, Na: 135, K: 5.5, Crea: 2.1 (Baseline 1.4)    s/p 1L LR and admitted to medicine      68-year-old male with history of hypertension, history of colorectal cancer status post chemotherapy, currently not on active treatment in remission, who presents for elevated creatinine on labs drawn on the 24th during routine follow up.  He was sent to by Dr. Dumont for admission.  He admits to likely dehydration while being in the [facility], reports increased heat exposure.  Denies any symptoms currently including decreased urination, vomiting, fever, abdominal pain. States urine is clear, no urinary sx.    In the ED: 148/70 mmHg. HR: 78 bpm, RR: 16, Temp: 98, SpO2: 99% RA    Labs : WBC: 4.04, Hg: 10.4, MCV: 103,8, Na: 135, K: 5.5, Crea: 2.1 (Baseline 1.4)    s/p 1L LR and admitted to medicine

## 2023-07-26 NOTE — H&P ADULT - NSHPPHYSICALEXAM_GEN_ALL_CORE
T(C): 36.7 (07-26-23 @ 13:05), Max: 36.7 (07-26-23 @ 13:05)  HR: 78 (07-26-23 @ 13:05) (78 - 78)  BP: 148/70 (07-26-23 @ 13:05) (148/70 - 148/70)  RR: 16 (07-26-23 @ 13:05) (16 - 16)  SpO2: 99% (07-26-23 @ 13:05) (99% - 99%)    CONSTITUTIONAL: Well groomed, no apparent distress  EYES: PERRLA and symmetric, EOMI, No conjunctival or scleral injection, non-icteric  RESP: No respiratory distress, no use of accessory muscles; CTA b/l, no WRR  CV: RRR, +S1S2, no MRG; no JVD; no peripheral edema  GI: Soft, NT, ND, no rebound, no guarding; no palpable masses; no hepatosplenomegaly; no hernia palpated  MSK: Normal gait; No digital clubbing or cyanosis
29.7

## 2023-07-26 NOTE — ED PROVIDER NOTE - CLINICAL SUMMARY MEDICAL DECISION MAKING FREE TEXT BOX
68-year-old male with history of hypertension, history of colorectal cancer status post chemotherapy, currently not on active treatment in remission, who presents for elevated creatinine on labs drawn on the 24th.  He was sent to by Dr. Dumont for admission.  He admits to likely dehydration while being in the [facility], reports increased heat exposure.  Denies any symptoms currently including decreased urination, vomiting, fever, abdominal pain. EKG, labs and imaging obtained and personally reviewed.  Appropriate medications for patient's presenting complaints were ordered and effects were reassessed.  Patient's records (prior hospital, ED visit) were reviewed.  Additional history was obtained from family.  Escalation to admission/observation was considered. Patient requires inpatient hospitalization - monitored setting. Patient updated at the bedside.

## 2023-07-26 NOTE — ED PROVIDER NOTE - OBJECTIVE STATEMENT
68-year-old male with history of hypertension, history of colorectal cancer status post chemotherapy, currently not on active treatment in remission, who presents for elevated creatinine on labs drawn on the 24th.  He was sent to by Dr. Dumont for admission.  He admits to likely dehydration while being in the [facility], reports increased heat exposure.  Denies any symptoms currently including decreased urination, vomiting, fever, abdominal pain.

## 2023-07-26 NOTE — PATIENT PROFILE ADULT - NSTRANSFERBELONGINGSRESP_GEN_A_NUR
Caller: SOHAM MEZA    Relationship: Father    Best call back number: [phone]     What form or medical record are you requesting: IMMUNIZATION RECORDS     Who is requesting this form or medical record from you: SOHAM MEZA     How would you like to receive the form or medical records (pick-up, mail, fax): PICK-UP    Timeframe paperwork needed: ASAP FOR DAY CARE     Additional notes: FATHER REQUESTING CALLBACK ONCE READY TO SIGN RELEASE/ IN OFFICE. PLEASE ADVISE   yes

## 2023-07-26 NOTE — H&P ADULT - ASSESSMENT
68-year-old male with history of hypertension, history of colorectal cancer status post chemotherapy, currently not on active treatment in remission admitted for MERCY    #[facility]CY  - Crea: 2.1 (Baseline 1.4)  - s/p 1 L LR bolus  - f/u UA  - c/w NS @ 75 cc/hr  68-year-old male with history of hypertension, history of colorectal cancer status post chemotherapy, currently not on active treatment in remission admitted for MERCY possibly 2/2 to dehydration.     #[facility]CY  #HyperKalemia  - Crea: 2.1 (Baseline 1.4)  - s/p 1 L LR bolus  - c/w NS @ 75 cc/hr   - f/u UA, urine studies    #HTN  #DLP  Home meds: Fenofibrate and Losartan 20 mg QD  HOLDING for now, restart when kidney fct improves     #Stage III rectal cancer status post chemoradiation and LAR/ileostomy and 4 cycles of adjuvant chemotherapy with XELOX  #STELLA nodule: consistent with oligometastasis disease.  - Not on chemotherapy is warranted at this time. But he is stage IV  - CT C/A/P 1/2023 LIZZ, unchanged nodule 7 mm groundglass     #Macrocytic anemia   - As per chart most probably related to Alchohol  - c/w home B12 and folic acid supp      #DVT ppx: SCDs  #GI ppx: No need  #Med rec: confirmed with patient    68-year-old male with history of hypertension, history of colorectal cancer status post chemotherapy, currently not on active treatment in remission admitted for MERCY possibly 2/2 to dehydration.     #[facility]CY  #HyperKalemia  - Crea: 2.1 (Baseline 1.4)  - s/p 1 L LR bolus  - c/w NS @ 75 cc/hr   - f/u UA, urine studies  - f/u KBUS    #HTN  #DLP  Home meds: Fenofibrate and Losartan 20 mg QD  HOLDING for now, restart when kidney fct improves     #Stage III rectal cancer status post chemoradiation and LAR/ileostomy and 4 cycles of adjuvant chemotherapy with XELOX  #STELLA nodule: consistent with oligometastasis disease.  - Not on chemotherapy is warranted at this time. But he is stage IV  - CT C/A/P 1/2023 LIZZ, unchanged nodule 7 mm groundglass     #Macrocytic anemia   - As per chart most probably related to Alchohol  - c/w home B12 and folic acid supp      #DVT ppx: SCDs  #GI ppx: No need  #Med rec: confirmed with patient

## 2023-07-27 ENCOUNTER — TRANSCRIPTION ENCOUNTER (OUTPATIENT)
Age: 68
End: 2023-07-27

## 2023-07-27 VITALS
TEMPERATURE: 97 F | DIASTOLIC BLOOD PRESSURE: 66 MMHG | HEART RATE: 60 BPM | SYSTOLIC BLOOD PRESSURE: 121 MMHG | RESPIRATION RATE: 18 BRPM

## 2023-07-27 LAB
ANION GAP SERPL CALC-SCNC: 10 MMOL/L — SIGNIFICANT CHANGE UP (ref 7–14)
ANION GAP SERPL CALC-SCNC: 10 MMOL/L — SIGNIFICANT CHANGE UP (ref 7–14)
APPEARANCE UR: CLEAR — SIGNIFICANT CHANGE UP
BILIRUB UR-MCNC: NEGATIVE — SIGNIFICANT CHANGE UP
BUN SERPL-MCNC: 30 MG/DL — HIGH (ref 10–20)
BUN SERPL-MCNC: 38 MG/DL — HIGH (ref 10–20)
CALCIUM SERPL-MCNC: 9.5 MG/DL — SIGNIFICANT CHANGE UP (ref 8.4–10.5)
CALCIUM SERPL-MCNC: 9.8 MG/DL — SIGNIFICANT CHANGE UP (ref 8.4–10.5)
CHLORIDE SERPL-SCNC: 104 MMOL/L — SIGNIFICANT CHANGE UP (ref 98–110)
CHLORIDE SERPL-SCNC: 104 MMOL/L — SIGNIFICANT CHANGE UP (ref 98–110)
CO2 SERPL-SCNC: 23 MMOL/L — SIGNIFICANT CHANGE UP (ref 17–32)
CO2 SERPL-SCNC: 26 MMOL/L — SIGNIFICANT CHANGE UP (ref 17–32)
COLOR SPEC: SIGNIFICANT CHANGE UP
CREAT SERPL-MCNC: 1.6 MG/DL — HIGH (ref 0.7–1.5)
CREAT SERPL-MCNC: 1.8 MG/DL — HIGH (ref 0.7–1.5)
DIFF PNL FLD: NEGATIVE — SIGNIFICANT CHANGE UP
EGFR: 40 ML/MIN/1.73M2 — LOW
EGFR: 46 ML/MIN/1.73M2 — LOW
GLUCOSE SERPL-MCNC: 139 MG/DL — HIGH (ref 70–99)
GLUCOSE SERPL-MCNC: 91 MG/DL — SIGNIFICANT CHANGE UP (ref 70–99)
GLUCOSE UR QL: NEGATIVE — SIGNIFICANT CHANGE UP
HCT VFR BLD CALC: 37.6 % — LOW (ref 42–52)
HCV AB S/CO SERPL IA: 0.04 COI — SIGNIFICANT CHANGE UP
HCV AB SERPL-IMP: SIGNIFICANT CHANGE UP
HGB BLD-MCNC: 12.8 G/DL — LOW (ref 14–18)
KETONES UR-MCNC: NEGATIVE — SIGNIFICANT CHANGE UP
LEUKOCYTE ESTERASE UR-ACNC: NEGATIVE — SIGNIFICANT CHANGE UP
MCHC RBC-ENTMCNC: 34 G/DL — SIGNIFICANT CHANGE UP (ref 32–37)
MCHC RBC-ENTMCNC: 34.9 PG — HIGH (ref 27–31)
MCV RBC AUTO: 102.5 FL — HIGH (ref 80–94)
NITRITE UR-MCNC: NEGATIVE — SIGNIFICANT CHANGE UP
NRBC # BLD: 0 /100 WBCS — SIGNIFICANT CHANGE UP (ref 0–0)
PH UR: 7 — SIGNIFICANT CHANGE UP (ref 5–8)
PLATELET # BLD AUTO: 254 K/UL — SIGNIFICANT CHANGE UP (ref 130–400)
PMV BLD: 9.8 FL — SIGNIFICANT CHANGE UP (ref 7.4–10.4)
POTASSIUM SERPL-MCNC: 4.7 MMOL/L — SIGNIFICANT CHANGE UP (ref 3.5–5)
POTASSIUM SERPL-MCNC: 4.7 MMOL/L — SIGNIFICANT CHANGE UP (ref 3.5–5)
POTASSIUM SERPL-SCNC: 4.7 MMOL/L — SIGNIFICANT CHANGE UP (ref 3.5–5)
POTASSIUM SERPL-SCNC: 4.7 MMOL/L — SIGNIFICANT CHANGE UP (ref 3.5–5)
PROT UR-MCNC: NEGATIVE — SIGNIFICANT CHANGE UP
RBC # BLD: 3.67 M/UL — LOW (ref 4.7–6.1)
RBC # FLD: 11.8 % — SIGNIFICANT CHANGE UP (ref 11.5–14.5)
SODIUM SERPL-SCNC: 137 MMOL/L — SIGNIFICANT CHANGE UP (ref 135–146)
SODIUM SERPL-SCNC: 140 MMOL/L — SIGNIFICANT CHANGE UP (ref 135–146)
SP GR SPEC: 1.01 — SIGNIFICANT CHANGE UP (ref 1.01–1.03)
UROBILINOGEN FLD QL: SIGNIFICANT CHANGE UP
WBC # BLD: 4.08 K/UL — LOW (ref 4.8–10.8)
WBC # FLD AUTO: 4.08 K/UL — LOW (ref 4.8–10.8)

## 2023-07-27 PROCEDURE — 99239 HOSP IP/OBS DSCHRG MGMT >30: CPT

## 2023-07-27 RX ORDER — LISINOPRIL 2.5 MG/1
1 TABLET ORAL
Refills: 0 | DISCHARGE

## 2023-07-27 NOTE — DISCHARGE NOTE PROVIDER - NSDCMRMEDTOKEN_GEN_ALL_CORE_FT
fenofibrate 145 mg oral tablet: 1 tab(s) orally once a day  lisinopril 20 mg oral tablet: 1 orally once a day   fenofibrate 145 mg oral tablet: 1 tab(s) orally once a day

## 2023-07-27 NOTE — DISCHARGE NOTE PROVIDER - NSDCCPCAREPLAN_GEN_ALL_CORE_FT
PRINCIPAL DISCHARGE DIAGNOSIS  Diagnosis: Elevated serum creatinine  Assessment and Plan of Treatment: You were hospitalized for elevation in your creatinine numbers to 2.1; secondary to dehydration after a long vacation in hot weather. You were given fluids and we monitored you creatinine numbers. Currently, your creatinine is trending down, urine is clear, no signs of dehydration.  Please stay hydrated, increase you liquid intake to 2L/day to avoid injury to the kidney.      SECONDARY DISCHARGE DIAGNOSES  Diagnosis: HLD (hyperlipidemia)  Assessment and Plan of Treatment:     Diagnosis: HTN (hypertension)  Assessment and Plan of Treatment:

## 2023-07-27 NOTE — DISCHARGE NOTE PROVIDER - ATTENDING DISCHARGE PHYSICAL EXAMINATION:
GENERAL: NAD, lying in bed comfortably  CHEST/LUNG: Clear to auscultation bilaterally; No rales, rhonchi, wheezing, or rubs. Unlabored respirations  HEART: Regular rate and rhythm; No murmurs, rubs, or gallops  ABDOMEN: Bowel sounds present; Soft, Nontender, Nondistended. No hepatomegally  EXTREMITIES:  2+ Peripheral Pulses, brisk capillary refill. No clubbing, cyanosis, or edema  NERVOUS SYSTEM:  Alert & Oriented X3, speech clear. No deficits   MSK: FROM all 4 extremities, full and equal strength  SKIN: No rashes or lesions

## 2023-07-27 NOTE — DISCHARGE NOTE PROVIDER - HOSPITAL COURSE
68-year-old male with history of hypertension, history of colorectal cancer status post chemotherapy, currently not on active treatment in remission, who presents for elevated creatinine on labs drawn on the 24th during routine follow up.  He was sent to by Dr. Dumont for admission.  He admits to likely dehydration while being in the [facility], reports increased heat exposure.  Denies any symptoms currently including decreased urination, vomiting, fever, abdominal pain. States urine is clear, no urinary sx.    In the ED: 148/70 mmHg. HR: 78 bpm, RR: 16, Temp: 98, SpO2: 99% RA    Labs : WBC: 4.04, Hg: 10.4, MCV: 103,8, Na: 135, K: 5.5, Crea: 2.1 (Baseline 1.4)    He was admitted for dehydration and treated with fluids.    Patient is stable, urine is clear, creatinine is trending down, ready for discharge    ASSESSMENT:  [facility] 2/2 dehydration   Hyperkalemia   -IV fluids   - serial Scr / K+  Resolved     HTN - stable   -c/w lisinopril    Dyslipidemia   -c/w fenofibrate

## 2023-07-27 NOTE — DISCHARGE NOTE PROVIDER - ATTENDING ATTESTATION STATEMENT
[2+] : left 2+ [Ankle Swelling (On Exam)] : present [Ankle Swelling On The Right] : of the right ankle [Varicose Veins Of Lower Extremities] : bilaterally [Ankle Swelling On The Left] : moderate [] : bilaterally [Ankle Swelling Bilaterally] : severe [FreeTextEntry1] : palp pulses I have personally seen and examined the patient. I have collaborated with and supervised the [de-identified] : LLE lateral full thickness wound is clean, no erythema, or odor, B shin hyperpigmentation, cluster of 5-6 mm VV running down the L shin

## 2023-07-27 NOTE — DISCHARGE NOTE NURSING/CASE MANAGEMENT/SOCIAL WORK - PATIENT PORTAL LINK FT
You can access the FollowMyHealth Patient Portal offered by [facility] by registering at the following website: [email or web address]. By joining [facility]’s FollowMyHealth portal, you will also be able to view your health information using other applications (apps) compatible with our system.

## 2023-07-27 NOTE — DISCHARGE NOTE PROVIDER - PROVIDER TOKENS
PROVIDER:[TOKEN:[[id number]],FOLLOWUP:[2 weeks]] PROVIDER:[TOKEN:[[id number]],FOLLOWUP:[1 week]]

## 2023-07-27 NOTE — DISCHARGE NOTE PROVIDER - CARE PROVIDER_API CALL
ROSE MARIE WOO  [address] [facility] [address]  [place] [address]  Phone: ([phone]  Fax: ()-  Follow Up Time: 2 weeks   ROSE MARIE WOO  [address] [facility] [address]  [place] [address]  Phone: ()-  Fax: ()-  Follow Up Time: 1 week

## 2023-07-28 ENCOUNTER — APPOINTMENT (OUTPATIENT)
Dept: HEMATOLOGY ONCOLOGY | Facility: CLINIC | Age: 68
End: 2023-07-28

## 2023-08-03 DIAGNOSIS — D53.9 NUTRITIONAL ANEMIA, UNSPECIFIED: ICD-10-CM

## 2023-08-03 DIAGNOSIS — C78.00 SECONDARY MALIGNANT NEOPLASM OF UNSPECIFIED LUNG: ICD-10-CM

## 2023-08-11 NOTE — H&P PST ADULT - NS PRO PASSIVE SMOKE EXP
Multidisciplinary Primary Liver Tumor and Complex Liver Disease Conference Results     Meeting Date:  08.09.2023    Scan Reviewed:  CT Liver Multiphase 08.02.2023     Discussion:  [id number] in Segment 8, no residual tumor. Good response to treatment.    Plan:  Recommend follow up CT in 3 months.      
No

## 2024-01-08 ENCOUNTER — LABORATORY RESULT (OUTPATIENT)
Age: 69
End: 2024-01-08

## 2024-01-08 ENCOUNTER — OUTPATIENT (OUTPATIENT)
Dept: OUTPATIENT SERVICES | Facility: HOSPITAL | Age: 69
LOS: 1 days | End: 2024-01-08
Payer: MEDICARE

## 2024-01-08 ENCOUNTER — APPOINTMENT (OUTPATIENT)
Dept: HEMATOLOGY ONCOLOGY | Facility: CLINIC | Age: 69
End: 2024-01-08
Payer: MEDICARE

## 2024-01-08 DIAGNOSIS — C20 MALIGNANT NEOPLASM OF RECTUM: ICD-10-CM

## 2024-01-08 DIAGNOSIS — Z98.890 OTHER SPECIFIED POSTPROCEDURAL STATES: Chronic | ICD-10-CM

## 2024-01-08 DIAGNOSIS — C78.00 SECONDARY MALIGNANT NEOPLASM OF UNSPECIFIED LUNG: ICD-10-CM

## 2024-01-08 DIAGNOSIS — D53.9 NUTRITIONAL ANEMIA, UNSPECIFIED: ICD-10-CM

## 2024-01-08 PROCEDURE — 99214 OFFICE O/P EST MOD 30 MIN: CPT

## 2024-01-08 PROCEDURE — 80053 COMPREHEN METABOLIC PANEL: CPT

## 2024-01-08 PROCEDURE — 85027 COMPLETE CBC AUTOMATED: CPT

## 2024-01-08 PROCEDURE — 82378 CARCINOEMBRYONIC ANTIGEN: CPT

## 2024-01-08 RX ORDER — METOPROLOL SUCCINATE 25 MG/1
25 TABLET, EXTENDED RELEASE ORAL
Refills: 0 | Status: ACTIVE | COMMUNITY

## 2024-01-09 DIAGNOSIS — C20 MALIGNANT NEOPLASM OF RECTUM: ICD-10-CM

## 2024-01-09 PROBLEM — C78.00 SECONDARY MALIGNANT NEOPLASM OF LUNG: Status: ACTIVE | Noted: 2019-10-21

## 2024-01-09 LAB
ALBUMIN SERPL ELPH-MCNC: 4.6 G/DL
ALP BLD-CCNC: 53 U/L
ALT SERPL-CCNC: 19 U/L
ANION GAP SERPL CALC-SCNC: 11 MMOL/L
AST SERPL-CCNC: 24 U/L
BILIRUB SERPL-MCNC: 0.4 MG/DL
BUN SERPL-MCNC: 18 MG/DL
CALCIUM SERPL-MCNC: 9.7 MG/DL
CHLORIDE SERPL-SCNC: 99 MMOL/L
CO2 SERPL-SCNC: 25 MMOL/L
CREAT SERPL-MCNC: 1.4 MG/DL
EGFR: 54 ML/MIN/1.73M2
GLUCOSE SERPL-MCNC: 107 MG/DL
HCT VFR BLD CALC: 37.4 %
HGB BLD-MCNC: 13.2 G/DL
MCHC RBC-ENTMCNC: 35 PG
MCHC RBC-ENTMCNC: 35.3 G/DL
MCV RBC AUTO: 99.2 FL
PLATELET # BLD AUTO: 208 K/UL
PMV BLD: 9.4 FL
POTASSIUM SERPL-SCNC: 4.1 MMOL/L
PROT SERPL-MCNC: 6.9 G/DL
RBC # BLD: 3.77 M/UL
RBC # FLD: 11.7 %
SODIUM SERPL-SCNC: 135 MMOL/L
WBC # FLD AUTO: 4.62 K/UL

## 2024-01-10 LAB — CEA SERPL-MCNC: 1.2 NG/ML

## 2024-01-14 PROBLEM — D53.9 MACROCYTIC ANEMIA: Status: ACTIVE | Noted: 2017-08-02

## 2024-01-14 NOTE — ASSESSMENT
[Curative] : Goals of care discussed with patient: Curative [FreeTextEntry1] : #Stage III rectal cancer status post chemoradiation and LAR/ileostomy and 4 cycles of adjuvant chemotherapy with XELOX Competed in December of 2016..  --His STELLA nodule kept growing and underwent wedge resection on October 1st 2019 which was consistent with oligometastatic disease. No chemotherapy is warranted at this time. But he is stage IV -CT C/A/P 8/2021 was LIZZ -CT C/A/P on 8/2022 are unchanged -CT C/A/P 1/2023 LIZZ, unchanged 7 mm groundglass nodule.   #Decrease white blood cell count as well as macrocytosis and mild anemia is stable - He had a bone marrow that did not demonstrate any pathology possibly has ICUS or due to ETOH use - I suspect this is due this Alcohol use since he claims 3 glasses of wine daily as and increase MCV may bee seen with Fatty liver as well which he has -I recommended only one glass of wine a day -He is on oral b12 and MMA were normal in 2022 will monitor on occasion  PLAN:  --CBC, CMP CEA today. Continue PO B12 supplementation.  --CT C/A/P for up to 5 years from 10/2019: Ordered today for 1/2024 --Had Colonoscopy in 5/2022 next 5/2025  RTC in 6 months with CBC, CMP, CEA.

## 2024-01-14 NOTE — HISTORY OF PRESENT ILLNESS
Disease: _____________________] : Disease: [unfilled] [T: ___] : T[unfilled] [N: ___] : N[unfilled] [M: ___] : M[unfilled] [AJCC Stage: ____] : AJCC Stage: [unfilled] [de-identified] : Patient is a 60 year old male who was diagnosed with rectal carcinoma after symptoms of hematochezia, tenesmus. He then underwent colonoscopy with Dr. Dory Carrillo on 04/22/2016, that showed an obstructing circumferential mass at 10 cm from anal verge consistent with adenocarcinoma. Patient was given chemoradiation with xeloda and he finsihed it then underwent laparoscopic LAR with TME and diverting loop ileostomy on 8/30/2016. Patient recovered from surgery except for hospitalization with dehydration from diarrhea postoperatively that resolved. He was started on adjuvant Xelox on 10/4/2016 and completed 4 cycles in December of 2016. [de-identified] : adenocarcinoma [de-identified] :   adenocarcinoma, low grade, well differentiated.  Tumor invades through the muscularis propria into subserosal adipose tissue   \par   [de-identified] : 5/9/17 He presents to establish care. He is to have his ostomy reversed but was noted to have low WBC on blood work. See john. Due to his cytopenia  he had a bone marrow biopsy in 2/2017 that showed hypocellular marrrow. Cytogenetics and FISH was negative.   He had a CT abd/pelvis on 3/2017 that showed Post rectal anastomosis and right lower quadrant ileostomy without CT evidence of abdominopelvic residual disease.Unchanged 6 mm ground-glass right lower lobe pulmonary nodule.   He had a colonoscopy on April 10th by Dr. Handley and reports it was normal.  He was to have his ostomy reversed by Timmy Arriaga but he was not cleared by his  PMD Dr. Earline Acosta due to low WBC. Repeat CBC showed improvement.  7/17/17 He is doing well. He has mild grade 1 neuropathy in feet that he barely feels. His bowel movements are improving.  No weight loss, no bleeding. Ostomy was reversed. .  8/2/17 I brought him back for follow up do tof all in his HgB. He states he feels well except for chronic neuropathy. Stools are brown and urine is yellow.  10/11/17 He is here for follow. He feels well.  He was started on Oral b12 tabs since his B12 level was low. He has no complaints.  1/10/18 He is here for follow up.  He has a CT C/A/P on 10/20/17 that showed stable pulmonary nodules and No evidence of metastatic disease within the abdomen or pelvis.  CEA was 2. He feels well. Was started back on his ACEi for HTN. Normal bowel movements. No bleeding. He stopped his b12   7/25/18 He is here for follow up. He had an US liver on 5/2018 that showed fatty liver. Colonosocpy on 5/2018 by Dr. Handley  shwoed only  mild radiation proctitis next colonosocy in 3 years May 2021. Blood work quest in May 2018  CMP normal, Cholesterol was a bit high. WBC 3.6, HgB 13.9, , Lymphs low 842, Plts 199 He states he feels fine. HE admits to drinking 3 glasses of wine every night.   10/17/18 He is here for follow up. Blodo work from [employer] 8/8/18 BNP normal,  LFT normal,  Wbc 3.5 normal diff, hgb 13.3, PSA 1.5 .7  12/3/18 Patient is here for a follow-up visit.  He is feeling well with no complaints.  Patient denies fever, chills, nausea, vomiting. CEA is stable. CT C/A/P 10/30/18 shows New solid left upper lobe pulmonary nodule measuring 5 mm. He has no complaints    2/13/19 Patient is here for a follow-up visit.  He is feeling well with no complaints.  Patient denies fever, chills, nausea, vomiting.  Most recent CEA from 12/6/18 is stable at 1.9ng/mL.  He has restarted Vitamin B12.   CT Chest 1/3/19 IMPRESSION: 1. 5 mm left upper lobe solid pulmonary nodule (4/116), stable since 10/30/2018 increased in size since 10/20/2017. Continued follow-up is recommended. 2. Additional bilateral subcentimeter nodules are stable since 5/11/2016. 3. No new suspicious mass or nodule.  Labs 1/15/19 Normal CMP, TSH 9.68, Ferritin 393, WBC 3.4, Hgb 13.5, , , Folate 8  6/12/19 He is here for follow-up of Rectal cancer.   He is doing well. he has no complaints. Still drinks 2-3 glasses of wine every night.  10/21/19 He is here for follow up.  Since last visit he had CT C/A/ P on 8/26/19. It showed growth in his STELLA pulmonary nodules. He than had a PET/CT on   9/4/19 7 mm left upper lobe solid nodule is FDG avid, SUV max 4.5, consistent with biologic tumor activity. On October 1 st he underwent wedge resection by Dr. Benjamin  path was consistent with Metastatic adenocarcinoma with colorectal primary    NGS no mutations, MMR intact,   He feels well. No complaints.   12/2/19 Patient is here for a follow-up visit.  He is feeling well with no new complaints.  Patient denies fever, chills, nausea, vomiting, new pain or bleeding.  He has plans for blood work in early January and will go for repeat scans afterward.  This appointment was scheduled months in advance, due to timing of treatment, he was instructed to come in January; however this visit was never canceled.    3/3/20 He is doing well. He has no issues. He had CT C/A/P on 1/23/20: Post partial left upper lobe resection.  No significant change in a 7 mm right lower lobe groundglass nodule.IMPRESSION:  No findings of metastatic disease in the abdomen or pelvis.   6/2/20 He is here for follow up. He has had constipation and with straining he started having pain in his right inguinal hernia and is pending to see Dr. John of surgery, Otherwise he is doing well.    11/30/20 He is here for follow up He had hernia repair in July with path showing only a hernia sac.  CEA ahs been normal.  CT C/A/P in June 2020  IMPRESSION: Since 1/23/2020, 1.  No significant interval change. 2.  Stable 7 mm right lower lobe groundglass nodule. 3.  Stable post partial left upper lobe resection.  IMPRESSION:  1.  No findings of metastatic disease in the abdomen or pelvis.  He feels well and has no complaints. Exam limited  due to COVID  6/14/2021 He is here for follow up. He feels well and has no complaints.   1/21/22 He is here for follow up. he had CT C/A/P in 8/2021 was  LIZZ. He feels well. He is now on folic acid  CBC from today is stable.    7/22/22 He is here for follow up. He had a colonoscopy on  5/9/22 that was normal. Next on in 2025. CBC from today showed stable macrocitic anemia  and decrease WBC with lymphopenia. He takes b12.  1/13/2023 He is here for follow up. Feels well. takes b12 and folate. has leg cramps on occasion  7/24/23 He is here for follow up. He feels well.   1/8/24: Pt returns for follow up. He reports feeling well. He reports no abdominal pain, change in bowel habits, or weight loss. He was admitted to the hospital for [facility], deemed to be secondary to Ace-i, and it resolved now.

## 2024-01-14 NOTE — END OF VISIT
[] : Fellow [FreeTextEntry3] : You is here for follow-up he is doing well.  We did blood work today with CEA and he is due for his repeat imaging CT chest Abdo pelvis ordered he will see me back in 6 months.  CBC was done as well for his now chronic cytopenias

## 2024-02-04 ENCOUNTER — RESULT REVIEW (OUTPATIENT)
Age: 69
End: 2024-02-04

## 2024-02-04 ENCOUNTER — OUTPATIENT (OUTPATIENT)
Dept: OUTPATIENT SERVICES | Facility: HOSPITAL | Age: 69
LOS: 1 days | End: 2024-02-04
Payer: MEDICARE

## 2024-02-04 DIAGNOSIS — C20 MALIGNANT NEOPLASM OF RECTUM: ICD-10-CM

## 2024-02-04 DIAGNOSIS — Z98.890 OTHER SPECIFIED POSTPROCEDURAL STATES: Chronic | ICD-10-CM

## 2024-02-04 PROCEDURE — 74177 CT ABD & PELVIS W/CONTRAST: CPT

## 2024-02-04 PROCEDURE — 71260 CT THORAX DX C+: CPT

## 2024-02-04 PROCEDURE — 74177 CT ABD & PELVIS W/CONTRAST: CPT | Mod: 26,MH

## 2024-02-04 PROCEDURE — 71260 CT THORAX DX C+: CPT | Mod: 26,MH

## 2024-02-05 DIAGNOSIS — C20 MALIGNANT NEOPLASM OF RECTUM: ICD-10-CM

## 2024-07-30 ENCOUNTER — APPOINTMENT (OUTPATIENT)
Age: 69
End: 2024-07-30
Payer: MEDICARE

## 2024-07-30 ENCOUNTER — OUTPATIENT (OUTPATIENT)
Dept: OUTPATIENT SERVICES | Facility: HOSPITAL | Age: 69
LOS: 1 days | End: 2024-07-30
Payer: MEDICARE

## 2024-07-30 ENCOUNTER — LABORATORY RESULT (OUTPATIENT)
Age: 69
End: 2024-07-30

## 2024-07-30 VITALS
DIASTOLIC BLOOD PRESSURE: 81 MMHG | HEIGHT: 69 IN | HEART RATE: 51 BPM | RESPIRATION RATE: 14 BRPM | SYSTOLIC BLOOD PRESSURE: 143 MMHG | TEMPERATURE: 97.1 F | BODY MASS INDEX: 21.62 KG/M2 | WEIGHT: 146 LBS

## 2024-07-30 DIAGNOSIS — Z98.890 OTHER SPECIFIED POSTPROCEDURAL STATES: Chronic | ICD-10-CM

## 2024-07-30 DIAGNOSIS — D53.9 NUTRITIONAL ANEMIA, UNSPECIFIED: ICD-10-CM

## 2024-07-30 DIAGNOSIS — C20 MALIGNANT NEOPLASM OF RECTUM: ICD-10-CM

## 2024-07-30 DIAGNOSIS — C78.00 SECONDARY MALIGNANT NEOPLASM OF UNSPECIFIED LUNG: ICD-10-CM

## 2024-07-30 LAB
ALBUMIN SERPL ELPH-MCNC: 4.7 G/DL
ALP BLD-CCNC: 57 U/L
ALT SERPL-CCNC: 17 U/L
ANION GAP SERPL CALC-SCNC: 12 MMOL/L
AST SERPL-CCNC: 21 U/L
BILIRUB SERPL-MCNC: 0.5 MG/DL
BUN SERPL-MCNC: 17 MG/DL
CALCIUM SERPL-MCNC: 9.8 MG/DL
CHLORIDE SERPL-SCNC: 104 MMOL/L
CO2 SERPL-SCNC: 24 MMOL/L
CREAT SERPL-MCNC: 1.3 MG/DL
EGFR: 59 ML/MIN/1.73M2
GLUCOSE SERPL-MCNC: 110 MG/DL
HCT VFR BLD CALC: 37.3 %
HGB BLD-MCNC: 13 G/DL
MCHC RBC-ENTMCNC: 34.9 G/DL
MCHC RBC-ENTMCNC: 35.3 PG
MCV RBC AUTO: 101.4 FL
PLATELET # BLD AUTO: 209 K/UL
PMV BLD: 9.3 FL
POTASSIUM SERPL-SCNC: 4.2 MMOL/L
PROT SERPL-MCNC: 6.5 G/DL
RBC # BLD: 3.68 M/UL
RBC # FLD: 11.6 %
SODIUM SERPL-SCNC: 140 MMOL/L
WBC # FLD AUTO: 4.17 K/UL

## 2024-07-30 PROCEDURE — 85027 COMPLETE CBC AUTOMATED: CPT

## 2024-07-30 PROCEDURE — G2211 COMPLEX E/M VISIT ADD ON: CPT

## 2024-07-30 PROCEDURE — 82607 VITAMIN B-12: CPT

## 2024-07-30 PROCEDURE — 99214 OFFICE O/P EST MOD 30 MIN: CPT

## 2024-07-30 PROCEDURE — 80053 COMPREHEN METABOLIC PANEL: CPT

## 2024-07-30 PROCEDURE — 82378 CARCINOEMBRYONIC ANTIGEN: CPT

## 2024-07-30 NOTE — HISTORY OF PRESENT ILLNESS
[Disease: _____________________] : Disease: [unfilled] [T: ___] : T[unfilled] [N: ___] : N[unfilled] [M: ___] : M[unfilled] [AJCC Stage: ____] : AJCC Stage: [unfilled] [de-identified] : Patient is a 60 year old male who was diagnosed with rectal carcinoma after symptoms of hematochezia, tenesmus. He then underwent colonoscopy with Dr. Dory Carrillo on 04/22/2016, that showed an obstructing circumferential mass at 10 cm from anal verge consistent with adenocarcinoma. Patient was given chemoradiation with xeloda and he finsihed it then underwent laparoscopic LAR with TME and diverting loop ileostomy on 8/30/2016. Patient recovered from surgery except for hospitalization with dehydration from diarrhea postoperatively that resolved. He was started on adjuvant Xelox on 10/4/2016 and completed 4 cycles in December of 2016. [de-identified] : adenocarcinoma [de-identified] :   adenocarcinoma, low grade, well differentiated.  Tumor invades through the muscularis propria into subserosal adipose tissue   \par   [de-identified] : 5/9/17 He presents to establish care. He is to have his ostomy reversed but was noted to have low WBC on blood work. See john. Due to his cytopenia  he had a bone marrow biopsy in 2/2017 that showed hypocellular marrrow. Cytogenetics and FISH was negative.   He had a CT abd/pelvis on 3/2017 that showed Post rectal anastomosis and right lower quadrant ileostomy without CT evidence of abdominopelvic residual disease.Unchanged 6 mm ground-glass right lower lobe pulmonary nodule.   He had a colonoscopy on April 10th by Dr. Handley and reports it was normal.  He was to have his ostomy reversed by Timmy Arriaga but he was not cleared by his  PMD Dr. Earline Acosta due to low WBC. Repeat CBC showed improvement.  7/17/17 He is doing well. He has mild grade 1 neuropathy in feet that he barely feels. His bowel movements are improving.  No weight loss, no bleeding. Ostomy was reversed. .  8/2/17 I brought him back for follow up do tof all in his HgB. He states he feels well except for chronic neuropathy. Stools are brown and urine is yellow.  10/11/17 He is here for follow. He feels well.  He was started on Oral b12 tabs since his B12 level was low. He has no complaints.  1/10/18 He is here for follow up.  He has a CT C/A/P on 10/20/17 that showed stable pulmonary nodules and No evidence of metastatic disease within the abdomen or pelvis.  CEA was 2. He feels well. Was started back on his ACEi for HTN. Normal bowel movements. No bleeding. He stopped his b12   7/25/18 He is here for follow up. He had an US liver on 5/2018 that showed fatty liver. Colonosocpy on 5/2018 by Dr. Handley  shwoed only  mild radiation proctitis next colonosocy in 3 years May 2021. Blood work quest in May 2018  CMP normal, Cholesterol was a bit high. WBC 3.6, HgB 13.9, , Lymphs low 842, Plts 199 He states he feels fine. HE admits to drinking 3 glasses of wine every night.   10/17/18 He is here for follow up. Blodo work from [employer] 8/8/18 BNP normal,  LFT normal,  Wbc 3.5 normal diff, hgb 13.3, PSA 1.5 .7  12/3/18 Patient is here for a follow-up visit.  He is feeling well with no complaints.  Patient denies fever, chills, nausea, vomiting. CEA is stable. CT C/A/P 10/30/18 shows New solid left upper lobe pulmonary nodule measuring 5 mm. He has no complaints    2/13/19 Patient is here for a follow-up visit.  He is feeling well with no complaints.  Patient denies fever, chills, nausea, vomiting.  Most recent CEA from 12/6/18 is stable at 1.9ng/mL.  He has restarted Vitamin B12.   CT Chest 1/3/19 IMPRESSION: 1. 5 mm left upper lobe solid pulmonary nodule (4/116), stable since 10/30/2018 increased in size since 10/20/2017. Continued follow-up is recommended. 2. Additional bilateral subcentimeter nodules are stable since 5/11/2016. 3. No new suspicious mass or nodule.  Labs 1/15/19 Normal CMP, TSH 9.68, Ferritin 393, WBC 3.4, Hgb 13.5, , , Folate 8  6/12/19 He is here for follow-up of Rectal cancer.   He is doing well. he has no complaints. Still drinks 2-3 glasses of wine every night.  10/21/19 He is here for follow up.  Since last visit he had CT C/A/ P on 8/26/19. It showed growth in his STELLA pulmonary nodules. He than had a PET/CT on   9/4/19 7 mm left upper lobe solid nodule is FDG avid, SUV max 4.5, consistent with biologic tumor activity. On October 1 st he underwent wedge resection by Dr. Benjamin  path was consistent with Metastatic adenocarcinoma with colorectal primary    NGS no mutations, MMR intact,   He feels well. No complaints.   12/2/19 Patient is here for a follow-up visit.  He is feeling well with no new complaints.  Patient denies fever, chills, nausea, vomiting, new pain or bleeding.  He has plans for blood work in early January and will go for repeat scans afterward.  This appointment was scheduled months in advance, due to timing of treatment, he was instructed to come in January; however this visit was never canceled.    3/3/20 He is doing well. He has no issues. He had CT C/A/P on 1/23/20: Post partial left upper lobe resection.  No significant change in a 7 mm right lower lobe groundglass nodule.IMPRESSION:  No findings of metastatic disease in the abdomen or pelvis.   6/2/20 He is here for follow up. He has had constipation and with straining he started having pain in his right inguinal hernia and is pending to see Dr. John of surgery, Otherwise he is doing well.    11/30/20 He is here for follow up He had hernia repair in July with path showing only a hernia sac.  CEA ahs been normal.  CT C/A/P in June 2020  IMPRESSION: Since 1/23/2020, 1.  No significant interval change. 2.  Stable 7 mm right lower lobe groundglass nodule. 3.  Stable post partial left upper lobe resection.  IMPRESSION:  1.  No findings of metastatic disease in the abdomen or pelvis.  He feels well and has no complaints. Exam limited  due to COVID  6/14/2021 He is here for follow up. He feels well and has no complaints.   1/21/22 He is here for follow up. he had CT C/A/P in 8/2021 was  LIZZ. He feels well. He is now on folic acid  CBC from today is stable.    7/22/22 He is here for follow up. He had a colonoscopy on  5/9/22 that was normal. Next on in 2025. CBC from today showed stable macrocitic anemia  and decrease WBC with lymphopenia. He takes b12.  1/13/2023 He is here for follow up. Feels well. takes b12 and folate. has leg cramps on occasion  7/24/23 He is here for follow up. He feels well.   1/8/24: Pt returns for follow up. He reports feeling well. He reports no abdominal pain, change in bowel habits, or weight loss. He was admitted to the hospital for [facility], deemed to be secondary to Ace-i, and it resolved now.   July 30, 2024 You is here for follow-up he is doing well he had imaging in February which was LIZZ.  CBC from today shows his chronic mild microcytic anemia and decreased white blood cell count with lymphopenia neutrophils are normal

## 2024-07-30 NOTE — ASSESSMENT
[FreeTextEntry1] : #Stage III rectal cancer status post chemoradiation and LAR/ileostomy and 4 cycles of adjuvant chemotherapy with XELOX Competed in December of 2016..  --His STELLA nodule kept growing and underwent wedge resection on October 1st 2019 which was consistent with oligometastatic disease. No chemotherapy is warranted at this time. But he is stage IV -CT C/A/P 8/2021 was LIZZ -CT C/A/P on 8/2022 are unchanged -CT C/A/P 1/2023 LIZZ, unchanged 7 mm groundglass nodule.  -CT C/A/P:  2/2024 LIZZ  #Decrease white blood cell count as well as macrocytosis and mild anemia is stable - He had a bone marrow that did not demonstrate any pathology possibly has ICUS or due to ETOH use - I suspect this is due this Alcohol use since he claims 3 glasses of wine daily as and increase MCV may bee seen with Fatty liver as well which he has -I recommended only one glass of wine a day -He is on oral b12 and MMA were normal in 2022 will monitor on occasion and remains stable if significan change will perform a bone marrow biopsy  PLAN:  --CBC reviewed , CMP CEA today and b12. Continue PO B12 supplementation.  --CT C/A/P for up to 5 years we can check again 2/2025 and will be last imaging  --Had Colonoscopy in 5/2022 next 5/2025  RTC in 6 months with CBC, CMP, CEA.  [Curative] : Goals of care discussed with patient: Curative

## 2024-07-31 DIAGNOSIS — C78.00 SECONDARY MALIGNANT NEOPLASM OF UNSPECIFIED LUNG: ICD-10-CM

## 2024-07-31 LAB
CEA SERPL-MCNC: 1.3 NG/ML
VIT B12 SERPL-MCNC: 788 PG/ML

## 2024-09-16 NOTE — ED ADULT NURSE NOTE - CAS ELECT INFOMATION PROVIDED
ED / Discharge Outreach Protocol    Patient Contact    Attempt # 1    Was call answered?  No.  Left message on voicemail with information to call me back.     DC instructions

## 2025-01-09 ENCOUNTER — OUTPATIENT (OUTPATIENT)
Dept: OUTPATIENT SERVICES | Facility: HOSPITAL | Age: 70
LOS: 1 days | End: 2025-01-09
Payer: MEDICARE

## 2025-01-09 ENCOUNTER — APPOINTMENT (OUTPATIENT)
Age: 70
End: 2025-01-09
Payer: MEDICARE

## 2025-01-09 ENCOUNTER — LABORATORY RESULT (OUTPATIENT)
Age: 70
End: 2025-01-09

## 2025-01-09 VITALS
DIASTOLIC BLOOD PRESSURE: 83 MMHG | HEIGHT: 69 IN | RESPIRATION RATE: 14 BRPM | SYSTOLIC BLOOD PRESSURE: 167 MMHG | HEART RATE: 62 BPM | OXYGEN SATURATION: 97 % | WEIGHT: 146 LBS | TEMPERATURE: 98 F | BODY MASS INDEX: 21.62 KG/M2

## 2025-01-09 DIAGNOSIS — C78.00 SECONDARY MALIGNANT NEOPLASM OF UNSPECIFIED LUNG: ICD-10-CM

## 2025-01-09 DIAGNOSIS — E53.8 DEFICIENCY OF OTHER SPECIFIED B GROUP VITAMINS: ICD-10-CM

## 2025-01-09 DIAGNOSIS — Z98.890 OTHER SPECIFIED POSTPROCEDURAL STATES: Chronic | ICD-10-CM

## 2025-01-09 DIAGNOSIS — C20 MALIGNANT NEOPLASM OF RECTUM: ICD-10-CM

## 2025-01-09 DIAGNOSIS — D53.9 NUTRITIONAL ANEMIA, UNSPECIFIED: ICD-10-CM

## 2025-01-09 LAB
ALBUMIN SERPL ELPH-MCNC: 4.5 G/DL
ALP BLD-CCNC: 70 U/L
ALT SERPL-CCNC: 20 U/L
ANION GAP SERPL CALC-SCNC: 10 MMOL/L
AST SERPL-CCNC: 25 U/L
BILIRUB SERPL-MCNC: 0.5 MG/DL
BUN SERPL-MCNC: 21 MG/DL
CALCIUM SERPL-MCNC: 9.3 MG/DL
CHLORIDE SERPL-SCNC: 103 MMOL/L
CO2 SERPL-SCNC: 25 MMOL/L
CREAT SERPL-MCNC: 1 MG/DL
EGFR: 81 ML/MIN/1.73M2
GLUCOSE SERPL-MCNC: 111 MG/DL
HCT VFR BLD CALC: 36.9 %
HGB BLD-MCNC: 12.7 G/DL
MCHC RBC-ENTMCNC: 34.4 G/DL
MCHC RBC-ENTMCNC: 34.5 PG
MCV RBC AUTO: 100.3 FL
PLATELET # BLD AUTO: 218 K/UL
PMV BLD: 9.5 FL
POTASSIUM SERPL-SCNC: 4.6 MMOL/L
PROT SERPL-MCNC: 6.7 G/DL
RBC # BLD: 3.68 M/UL
RBC # FLD: 11.9 %
SODIUM SERPL-SCNC: 138 MMOL/L
WBC # FLD AUTO: 4.03 K/UL

## 2025-01-09 PROCEDURE — 82378 CARCINOEMBRYONIC ANTIGEN: CPT

## 2025-01-09 PROCEDURE — 99214 OFFICE O/P EST MOD 30 MIN: CPT

## 2025-01-09 PROCEDURE — G2211 COMPLEX E/M VISIT ADD ON: CPT

## 2025-01-09 PROCEDURE — 80053 COMPREHEN METABOLIC PANEL: CPT

## 2025-01-09 PROCEDURE — 85027 COMPLETE CBC AUTOMATED: CPT

## 2025-01-09 RX ORDER — AMLODIPINE BESYLATE 10 MG/1
10 TABLET ORAL
Refills: 0 | Status: ACTIVE | COMMUNITY

## 2025-01-09 RX ORDER — FOLIC ACID 1 MG/1
1 TABLET ORAL
Refills: 0 | Status: ACTIVE | COMMUNITY

## 2025-01-10 DIAGNOSIS — C78.00 SECONDARY MALIGNANT NEOPLASM OF UNSPECIFIED LUNG: ICD-10-CM

## 2025-01-10 LAB — CEA SERPL-MCNC: 1.3 NG/ML

## 2025-01-17 NOTE — PATIENT PROFILE ADULT - FUNCTIONAL SCREEN CURRENT LEVEL: SWALLOWING (IF SCORE 2 OR MORE FOR ANY ITEM, CONSULT REHAB SERVICES), MLM)
Pt notified of need to use Advair inhaler 1 puff twice a day every day.  He verbalized understanding.    ----- Message from Saritha Desouza DO sent at 1/17/2025 12:30 PM CST -----  Can do Advair the 250 dose.  Thanks  ----- Message -----  From: Keyanna Diallo RN  Sent: 1/16/2025   5:40 PM CST  To: Saritha Desouza DO    I typed in [facility] to send a script, but it's not covered and I am not sure which dose of Advair would be appropriate.  Can you please order and send to [facility]?   0 = swallows foods/liquids without difficulty

## 2025-02-05 ENCOUNTER — RESULT REVIEW (OUTPATIENT)
Age: 70
End: 2025-02-05

## 2025-02-05 ENCOUNTER — OUTPATIENT (OUTPATIENT)
Dept: OUTPATIENT SERVICES | Facility: HOSPITAL | Age: 70
LOS: 1 days | End: 2025-02-05
Payer: MEDICARE

## 2025-02-05 DIAGNOSIS — Z98.890 OTHER SPECIFIED POSTPROCEDURAL STATES: Chronic | ICD-10-CM

## 2025-02-05 DIAGNOSIS — Z00.8 ENCOUNTER FOR OTHER GENERAL EXAMINATION: ICD-10-CM

## 2025-02-05 DIAGNOSIS — C20 MALIGNANT NEOPLASM OF RECTUM: ICD-10-CM

## 2025-02-05 PROCEDURE — 71260 CT THORAX DX C+: CPT

## 2025-02-05 PROCEDURE — 74177 CT ABD & PELVIS W/CONTRAST: CPT | Mod: 26

## 2025-02-05 PROCEDURE — 74177 CT ABD & PELVIS W/CONTRAST: CPT

## 2025-02-05 PROCEDURE — 71260 CT THORAX DX C+: CPT | Mod: 26

## 2025-02-06 DIAGNOSIS — C20 MALIGNANT NEOPLASM OF RECTUM: ICD-10-CM
